# Patient Record
Sex: FEMALE | Race: WHITE | Employment: OTHER | URBAN - METROPOLITAN AREA
[De-identification: names, ages, dates, MRNs, and addresses within clinical notes are randomized per-mention and may not be internally consistent; named-entity substitution may affect disease eponyms.]

---

## 2017-01-30 ENCOUNTER — OFFICE VISIT (OUTPATIENT)
Dept: DERMATOLOGY | Age: 82
End: 2017-01-30

## 2017-01-30 DIAGNOSIS — L82.0 SEBORRHEIC KERATOSES, INFLAMED: Primary | ICD-10-CM

## 2017-01-30 DIAGNOSIS — L57.0 AK (ACTINIC KERATOSIS): ICD-10-CM

## 2017-01-30 DIAGNOSIS — L82.1 SEBORRHEIC KERATOSES: ICD-10-CM

## 2017-01-30 PROCEDURE — 17000 DESTRUCT PREMALG LESION: CPT | Performed by: DERMATOLOGY

## 2017-01-30 PROCEDURE — 17110 DESTRUCTION B9 LES UP TO 14: CPT | Performed by: DERMATOLOGY

## 2017-01-30 PROCEDURE — 99201 PR OFFICE OUTPATIENT NEW 10 MINUTES: CPT | Performed by: DERMATOLOGY

## 2017-02-03 ENCOUNTER — OFFICE VISIT (OUTPATIENT)
Dept: INTERNAL MEDICINE CLINIC | Age: 82
End: 2017-02-03

## 2017-02-03 VITALS
HEART RATE: 76 BPM | WEIGHT: 167 LBS | SYSTOLIC BLOOD PRESSURE: 136 MMHG | RESPIRATION RATE: 16 BRPM | BODY MASS INDEX: 32.79 KG/M2 | DIASTOLIC BLOOD PRESSURE: 80 MMHG | HEIGHT: 60 IN

## 2017-02-03 DIAGNOSIS — M79.606 PAIN OF LOWER EXTREMITY, UNSPECIFIED LATERALITY: Primary | ICD-10-CM

## 2017-02-03 DIAGNOSIS — R73.09 BLOOD GLUCOSE ABNORMAL: ICD-10-CM

## 2017-02-03 DIAGNOSIS — K64.9 HEMORRHOIDS, UNSPECIFIED HEMORRHOID TYPE: ICD-10-CM

## 2017-02-03 DIAGNOSIS — R73.9 HYPERGLYCEMIA: ICD-10-CM

## 2017-02-03 PROCEDURE — 99214 OFFICE O/P EST MOD 30 MIN: CPT | Performed by: INTERNAL MEDICINE

## 2017-02-03 ASSESSMENT — ENCOUNTER SYMPTOMS
SHORTNESS OF BREATH: 0
ABDOMINAL PAIN: 0

## 2017-02-04 LAB
ESTIMATED AVERAGE GLUCOSE: 111.2 MG/DL
HBA1C MFR BLD: 5.5 %

## 2017-02-08 RX ORDER — LORAZEPAM 0.5 MG/1
TABLET ORAL
Qty: 60 TABLET | Refills: 2 | Status: SHIPPED | OUTPATIENT
Start: 2017-02-08 | End: 2017-05-08 | Stop reason: SDUPTHER

## 2017-03-06 RX ORDER — PANTOPRAZOLE SODIUM 40 MG
TABLET, DELAYED RELEASE (ENTERIC COATED) ORAL
Qty: 90 TABLET | Refills: 0 | Status: SHIPPED | OUTPATIENT
Start: 2017-03-06 | End: 2017-06-01 | Stop reason: SDUPTHER

## 2017-03-07 ENCOUNTER — OFFICE VISIT (OUTPATIENT)
Dept: INTERNAL MEDICINE CLINIC | Age: 82
End: 2017-03-07

## 2017-03-07 VITALS
OXYGEN SATURATION: 98 % | SYSTOLIC BLOOD PRESSURE: 140 MMHG | WEIGHT: 167 LBS | HEART RATE: 84 BPM | HEIGHT: 60 IN | DIASTOLIC BLOOD PRESSURE: 80 MMHG | BODY MASS INDEX: 32.79 KG/M2

## 2017-03-07 DIAGNOSIS — M19.90 ARTHRITIS: ICD-10-CM

## 2017-03-07 DIAGNOSIS — G62.9 NEUROPATHY: Primary | ICD-10-CM

## 2017-03-07 LAB — SEDIMENTATION RATE, ERYTHROCYTE: 15 MM/HR (ref 0–30)

## 2017-03-07 PROCEDURE — 99213 OFFICE O/P EST LOW 20 MIN: CPT | Performed by: INTERNAL MEDICINE

## 2017-03-07 ASSESSMENT — ENCOUNTER SYMPTOMS
EYES NEGATIVE: 1
RESPIRATORY NEGATIVE: 1

## 2017-03-08 LAB
ALBUMIN SERPL-MCNC: 3.6 G/DL (ref 3.1–4.9)
ALPHA-1-GLOBULIN: 0.3 G/DL (ref 0.2–0.4)
ALPHA-2-GLOBULIN: 0.8 G/DL (ref 0.4–1.1)
BETA GLOBULIN: 1.1 G/DL (ref 0.9–1.6)
GAMMA GLOBULIN: 0.9 G/DL (ref 0.6–1.8)
SPE/IFE INTERPRETATION: NORMAL
TOTAL PROTEIN: 6.7 G/DL (ref 6.4–8.2)

## 2017-04-13 ENCOUNTER — OFFICE VISIT (OUTPATIENT)
Dept: INTERNAL MEDICINE CLINIC | Age: 82
End: 2017-04-13

## 2017-04-13 VITALS
HEART RATE: 80 BPM | HEIGHT: 60 IN | DIASTOLIC BLOOD PRESSURE: 76 MMHG | BODY MASS INDEX: 32.59 KG/M2 | SYSTOLIC BLOOD PRESSURE: 148 MMHG | RESPIRATION RATE: 16 BRPM | WEIGHT: 166 LBS

## 2017-04-13 DIAGNOSIS — K21.9 GASTROESOPHAGEAL REFLUX DISEASE WITHOUT ESOPHAGITIS: ICD-10-CM

## 2017-04-13 DIAGNOSIS — F41.9 ANXIETY: Primary | ICD-10-CM

## 2017-04-13 DIAGNOSIS — I10 ESSENTIAL HYPERTENSION: ICD-10-CM

## 2017-04-13 PROCEDURE — 99213 OFFICE O/P EST LOW 20 MIN: CPT | Performed by: INTERNAL MEDICINE

## 2017-04-13 ASSESSMENT — ENCOUNTER SYMPTOMS
ABDOMINAL PAIN: 0
SHORTNESS OF BREATH: 0

## 2017-04-24 ASSESSMENT — ENCOUNTER SYMPTOMS
ABDOMINAL PAIN: 0
SHORTNESS OF BREATH: 0

## 2017-04-26 DIAGNOSIS — E78.00 PURE HYPERCHOLESTEROLEMIA: Primary | ICD-10-CM

## 2017-04-26 DIAGNOSIS — I10 ESSENTIAL HYPERTENSION: ICD-10-CM

## 2017-04-26 LAB
A/G RATIO: 1.8 (ref 1.1–2.2)
ALBUMIN SERPL-MCNC: 4.3 G/DL (ref 3.4–5)
ALP BLD-CCNC: 61 U/L (ref 40–129)
ALT SERPL-CCNC: 23 U/L (ref 10–40)
ANION GAP SERPL CALCULATED.3IONS-SCNC: 16 MMOL/L (ref 3–16)
AST SERPL-CCNC: 24 U/L (ref 15–37)
BASOPHILS ABSOLUTE: 0 K/UL (ref 0–0.2)
BASOPHILS RELATIVE PERCENT: 0.4 %
BILIRUB SERPL-MCNC: 0.5 MG/DL (ref 0–1)
BUN BLDV-MCNC: 15 MG/DL (ref 7–20)
CALCIUM SERPL-MCNC: 9.8 MG/DL (ref 8.3–10.6)
CHLORIDE BLD-SCNC: 99 MMOL/L (ref 99–110)
CHOLESTEROL, TOTAL: 174 MG/DL (ref 0–199)
CO2: 24 MMOL/L (ref 21–32)
CREAT SERPL-MCNC: 0.7 MG/DL (ref 0.6–1.2)
EOSINOPHILS ABSOLUTE: 0.1 K/UL (ref 0–0.6)
EOSINOPHILS RELATIVE PERCENT: 2.7 %
GFR AFRICAN AMERICAN: >60
GFR NON-AFRICAN AMERICAN: >60
GLOBULIN: 2.4 G/DL
GLUCOSE BLD-MCNC: 104 MG/DL (ref 70–99)
HCT VFR BLD CALC: 42.3 % (ref 36–48)
HDLC SERPL-MCNC: 62 MG/DL (ref 40–60)
HEMOGLOBIN: 13.8 G/DL (ref 12–16)
LDL CHOLESTEROL CALCULATED: 72 MG/DL
LYMPHOCYTES ABSOLUTE: 1 K/UL (ref 1–5.1)
LYMPHOCYTES RELATIVE PERCENT: 22.4 %
MCH RBC QN AUTO: 30.7 PG (ref 26–34)
MCHC RBC AUTO-ENTMCNC: 32.6 G/DL (ref 31–36)
MCV RBC AUTO: 94 FL (ref 80–100)
MONOCYTES ABSOLUTE: 0.4 K/UL (ref 0–1.3)
MONOCYTES RELATIVE PERCENT: 9.2 %
NEUTROPHILS ABSOLUTE: 3 K/UL (ref 1.7–7.7)
NEUTROPHILS RELATIVE PERCENT: 65.3 %
PDW BLD-RTO: 13.2 % (ref 12.4–15.4)
PLATELET # BLD: 215 K/UL (ref 135–450)
PMV BLD AUTO: 9.1 FL (ref 5–10.5)
POTASSIUM SERPL-SCNC: 4.2 MMOL/L (ref 3.5–5.1)
RBC # BLD: 4.5 M/UL (ref 4–5.2)
SODIUM BLD-SCNC: 139 MMOL/L (ref 136–145)
TOTAL PROTEIN: 6.7 G/DL (ref 6.4–8.2)
TRIGL SERPL-MCNC: 201 MG/DL (ref 0–150)
VLDLC SERPL CALC-MCNC: 40 MG/DL
WBC # BLD: 4.6 K/UL (ref 4–11)

## 2017-04-28 ENCOUNTER — OFFICE VISIT (OUTPATIENT)
Dept: INTERNAL MEDICINE CLINIC | Age: 82
End: 2017-04-28

## 2017-04-28 VITALS
SYSTOLIC BLOOD PRESSURE: 136 MMHG | WEIGHT: 165 LBS | BODY MASS INDEX: 32.39 KG/M2 | HEIGHT: 60 IN | RESPIRATION RATE: 16 BRPM | HEART RATE: 88 BPM | DIASTOLIC BLOOD PRESSURE: 64 MMHG

## 2017-04-28 DIAGNOSIS — E78.00 PURE HYPERCHOLESTEROLEMIA: ICD-10-CM

## 2017-04-28 DIAGNOSIS — F41.9 ANXIETY: Primary | ICD-10-CM

## 2017-04-28 DIAGNOSIS — I10 ESSENTIAL HYPERTENSION: ICD-10-CM

## 2017-04-28 PROCEDURE — 99214 OFFICE O/P EST MOD 30 MIN: CPT | Performed by: INTERNAL MEDICINE

## 2017-05-09 RX ORDER — LORAZEPAM 0.5 MG/1
TABLET ORAL
Qty: 60 TABLET | Refills: 3 | Status: SHIPPED | OUTPATIENT
Start: 2017-05-09 | End: 2017-09-06 | Stop reason: SDUPTHER

## 2017-05-23 RX ORDER — SIMVASTATIN 10 MG
TABLET ORAL
Qty: 90 TABLET | Refills: 2 | Status: SHIPPED | OUTPATIENT
Start: 2017-05-23 | End: 2017-11-28

## 2017-06-01 RX ORDER — PANTOPRAZOLE SODIUM 40 MG
TABLET, DELAYED RELEASE (ENTERIC COATED) ORAL
Qty: 90 TABLET | Refills: 0 | Status: SHIPPED | OUTPATIENT
Start: 2017-06-01 | End: 2017-09-02 | Stop reason: SDUPTHER

## 2017-06-19 ENCOUNTER — TELEPHONE (OUTPATIENT)
Dept: INTERNAL MEDICINE CLINIC | Age: 82
End: 2017-06-19

## 2017-06-20 ENCOUNTER — OFFICE VISIT (OUTPATIENT)
Dept: INTERNAL MEDICINE CLINIC | Age: 82
End: 2017-06-20

## 2017-06-20 VITALS
RESPIRATION RATE: 16 BRPM | DIASTOLIC BLOOD PRESSURE: 80 MMHG | BODY MASS INDEX: 31.8 KG/M2 | WEIGHT: 162 LBS | HEIGHT: 60 IN | HEART RATE: 80 BPM | SYSTOLIC BLOOD PRESSURE: 136 MMHG

## 2017-06-20 DIAGNOSIS — I10 ESSENTIAL HYPERTENSION: Primary | ICD-10-CM

## 2017-06-20 DIAGNOSIS — R53.1 WEAKNESS: ICD-10-CM

## 2017-06-20 DIAGNOSIS — E78.00 PURE HYPERCHOLESTEROLEMIA: ICD-10-CM

## 2017-06-20 PROCEDURE — 99214 OFFICE O/P EST MOD 30 MIN: CPT | Performed by: INTERNAL MEDICINE

## 2017-06-20 RX ORDER — FUROSEMIDE 20 MG/1
TABLET ORAL
Qty: 90 TABLET | Refills: 3 | Status: SHIPPED
Start: 2017-06-20 | End: 2019-06-10 | Stop reason: SDUPTHER

## 2017-06-20 RX ORDER — POTASSIUM CHLORIDE 750 MG/1
10 TABLET, EXTENDED RELEASE ORAL EVERY OTHER DAY
Qty: 90 TABLET | Refills: 2
Start: 2017-06-20 | End: 2017-09-05

## 2017-06-20 ASSESSMENT — ENCOUNTER SYMPTOMS
SHORTNESS OF BREATH: 0
ABDOMINAL PAIN: 0

## 2017-06-23 ASSESSMENT — ENCOUNTER SYMPTOMS
ABDOMINAL PAIN: 0
SHORTNESS OF BREATH: 0

## 2017-06-27 ENCOUNTER — OFFICE VISIT (OUTPATIENT)
Dept: INTERNAL MEDICINE CLINIC | Age: 82
End: 2017-06-27

## 2017-06-27 DIAGNOSIS — N30.00 ACUTE CYSTITIS WITHOUT HEMATURIA: Primary | ICD-10-CM

## 2017-06-27 DIAGNOSIS — F41.9 ANXIETY: ICD-10-CM

## 2017-06-27 DIAGNOSIS — I10 ESSENTIAL HYPERTENSION: ICD-10-CM

## 2017-06-27 PROCEDURE — 99999 PR OFFICE/OUTPT VISIT,PROCEDURE ONLY: CPT | Performed by: INTERNAL MEDICINE

## 2017-08-07 ENCOUNTER — OFFICE VISIT (OUTPATIENT)
Dept: INTERNAL MEDICINE CLINIC | Age: 82
End: 2017-08-07

## 2017-08-07 VITALS
OXYGEN SATURATION: 98 % | BODY MASS INDEX: 31.53 KG/M2 | TEMPERATURE: 97.7 F | RESPIRATION RATE: 16 BRPM | WEIGHT: 160.6 LBS | HEIGHT: 60 IN | SYSTOLIC BLOOD PRESSURE: 138 MMHG | DIASTOLIC BLOOD PRESSURE: 86 MMHG | HEART RATE: 64 BPM

## 2017-08-07 DIAGNOSIS — I10 ESSENTIAL HYPERTENSION: ICD-10-CM

## 2017-08-07 DIAGNOSIS — K21.9 GASTROESOPHAGEAL REFLUX DISEASE WITHOUT ESOPHAGITIS: ICD-10-CM

## 2017-08-07 DIAGNOSIS — R10.9 ABDOMINAL PAIN, UNSPECIFIED LOCATION: Primary | ICD-10-CM

## 2017-08-07 PROCEDURE — 99214 OFFICE O/P EST MOD 30 MIN: CPT | Performed by: INTERNAL MEDICINE

## 2017-08-07 ASSESSMENT — PATIENT HEALTH QUESTIONNAIRE - PHQ9
SUM OF ALL RESPONSES TO PHQ9 QUESTIONS 1 & 2: 0
2. FEELING DOWN, DEPRESSED OR HOPELESS: 0
SUM OF ALL RESPONSES TO PHQ QUESTIONS 1-9: 0
1. LITTLE INTEREST OR PLEASURE IN DOING THINGS: 0

## 2017-08-07 ASSESSMENT — ENCOUNTER SYMPTOMS
ABDOMINAL PAIN: 0
SHORTNESS OF BREATH: 0

## 2017-08-14 ENCOUNTER — OFFICE VISIT (OUTPATIENT)
Dept: INTERNAL MEDICINE CLINIC | Age: 82
End: 2017-08-14

## 2017-08-14 VITALS
RESPIRATION RATE: 16 BRPM | HEART RATE: 80 BPM | BODY MASS INDEX: 31.22 KG/M2 | WEIGHT: 159 LBS | SYSTOLIC BLOOD PRESSURE: 130 MMHG | DIASTOLIC BLOOD PRESSURE: 72 MMHG | HEIGHT: 60 IN

## 2017-08-14 DIAGNOSIS — N39.0 LOWER URINARY TRACT INFECTIOUS DISEASE: ICD-10-CM

## 2017-08-14 DIAGNOSIS — I10 ESSENTIAL HYPERTENSION: ICD-10-CM

## 2017-08-14 DIAGNOSIS — R07.2 PRECORDIAL PAIN: Primary | ICD-10-CM

## 2017-08-14 DIAGNOSIS — K21.9 GASTROESOPHAGEAL REFLUX DISEASE WITHOUT ESOPHAGITIS: ICD-10-CM

## 2017-08-14 LAB
BILIRUBIN, POC: NORMAL
BLOOD URINE, POC: NORMAL
CLARITY, POC: NORMAL
COLOR, POC: NORMAL
GLUCOSE URINE, POC: NORMAL
KETONES, POC: NORMAL
LEUKOCYTE EST, POC: NORMAL
NITRITE, POC: POSITIVE
PH, POC: 6
PROTEIN, POC: NORMAL
SPECIFIC GRAVITY, POC: 1.01
UROBILINOGEN, POC: 0.2

## 2017-08-14 PROCEDURE — 81002 URINALYSIS NONAUTO W/O SCOPE: CPT | Performed by: INTERNAL MEDICINE

## 2017-08-14 PROCEDURE — 99213 OFFICE O/P EST LOW 20 MIN: CPT | Performed by: INTERNAL MEDICINE

## 2017-08-14 RX ORDER — CEFUROXIME AXETIL 250 MG/1
250 TABLET ORAL 2 TIMES DAILY
Qty: 10 TABLET | Refills: 0 | Status: SHIPPED | OUTPATIENT
Start: 2017-08-14 | End: 2017-08-19

## 2017-08-14 ASSESSMENT — ENCOUNTER SYMPTOMS
ABDOMINAL PAIN: 0
SHORTNESS OF BREATH: 0

## 2017-08-16 LAB — URINE CULTURE, ROUTINE: NORMAL

## 2017-08-22 ASSESSMENT — ENCOUNTER SYMPTOMS
ABDOMINAL PAIN: 0
SHORTNESS OF BREATH: 0

## 2017-08-23 DIAGNOSIS — E78.00 PURE HYPERCHOLESTEROLEMIA: ICD-10-CM

## 2017-08-23 LAB
A/G RATIO: 2 (ref 1.1–2.2)
ALBUMIN SERPL-MCNC: 4.5 G/DL (ref 3.4–5)
ALP BLD-CCNC: 57 U/L (ref 40–129)
ALT SERPL-CCNC: 11 U/L (ref 10–40)
ANION GAP SERPL CALCULATED.3IONS-SCNC: 15 MMOL/L (ref 3–16)
AST SERPL-CCNC: 16 U/L (ref 15–37)
BILIRUB SERPL-MCNC: 0.6 MG/DL (ref 0–1)
BUN BLDV-MCNC: 17 MG/DL (ref 7–20)
CALCIUM SERPL-MCNC: 9.9 MG/DL (ref 8.3–10.6)
CHLORIDE BLD-SCNC: 100 MMOL/L (ref 99–110)
CHOLESTEROL, TOTAL: 190 MG/DL (ref 0–199)
CO2: 26 MMOL/L (ref 21–32)
CREAT SERPL-MCNC: 0.7 MG/DL (ref 0.6–1.2)
GFR AFRICAN AMERICAN: >60
GFR NON-AFRICAN AMERICAN: >60
GLOBULIN: 2.3 G/DL
GLUCOSE BLD-MCNC: 105 MG/DL (ref 70–99)
HDLC SERPL-MCNC: 71 MG/DL (ref 40–60)
LDL CHOLESTEROL CALCULATED: 91 MG/DL
POTASSIUM SERPL-SCNC: 4 MMOL/L (ref 3.5–5.1)
SODIUM BLD-SCNC: 141 MMOL/L (ref 136–145)
TOTAL PROTEIN: 6.8 G/DL (ref 6.4–8.2)
TRIGL SERPL-MCNC: 140 MG/DL (ref 0–150)
VLDLC SERPL CALC-MCNC: 28 MG/DL

## 2017-08-28 ENCOUNTER — OFFICE VISIT (OUTPATIENT)
Dept: INTERNAL MEDICINE CLINIC | Age: 82
End: 2017-08-28

## 2017-08-28 VITALS
WEIGHT: 160 LBS | BODY MASS INDEX: 31.41 KG/M2 | HEART RATE: 100 BPM | HEIGHT: 60 IN | RESPIRATION RATE: 16 BRPM | DIASTOLIC BLOOD PRESSURE: 80 MMHG | SYSTOLIC BLOOD PRESSURE: 132 MMHG

## 2017-08-28 DIAGNOSIS — R10.9 ABDOMINAL PAIN, UNSPECIFIED LOCATION: Primary | ICD-10-CM

## 2017-08-28 DIAGNOSIS — E78.00 HYPERCHOLESTEREMIA: ICD-10-CM

## 2017-08-28 DIAGNOSIS — F41.9 ANXIETY: ICD-10-CM

## 2017-08-28 DIAGNOSIS — I10 ESSENTIAL HYPERTENSION: ICD-10-CM

## 2017-08-28 PROCEDURE — 99214 OFFICE O/P EST MOD 30 MIN: CPT | Performed by: INTERNAL MEDICINE

## 2017-08-28 RX ORDER — GABAPENTIN 100 MG/1
100 CAPSULE ORAL NIGHTLY
Qty: 30 CAPSULE | Refills: 5 | Status: SHIPPED | OUTPATIENT
Start: 2017-08-28 | End: 2018-01-18 | Stop reason: SINTOL

## 2017-08-29 ENCOUNTER — TELEPHONE (OUTPATIENT)
Dept: INTERNAL MEDICINE CLINIC | Age: 82
End: 2017-08-29

## 2017-09-05 RX ORDER — PANTOPRAZOLE SODIUM 40 MG
TABLET, DELAYED RELEASE (ENTERIC COATED) ORAL
Qty: 90 TABLET | Refills: 0 | Status: SHIPPED | OUTPATIENT
Start: 2017-09-05 | End: 2017-12-05 | Stop reason: SDUPTHER

## 2017-09-05 RX ORDER — POTASSIUM CHLORIDE 750 MG/1
TABLET, EXTENDED RELEASE ORAL
Qty: 90 TABLET | Refills: 1 | Status: SHIPPED | OUTPATIENT
Start: 2017-09-05 | End: 2018-03-26 | Stop reason: SDUPTHER

## 2017-09-06 RX ORDER — LORAZEPAM 0.5 MG/1
TABLET ORAL
Qty: 60 TABLET | Refills: 3 | Status: SHIPPED | OUTPATIENT
Start: 2017-09-06 | End: 2018-01-03 | Stop reason: SDUPTHER

## 2017-11-21 DIAGNOSIS — E78.00 HYPERCHOLESTEREMIA: ICD-10-CM

## 2017-11-21 LAB
A/G RATIO: 1.6 (ref 1.1–2.2)
ALBUMIN SERPL-MCNC: 4.4 G/DL (ref 3.4–5)
ALP BLD-CCNC: 60 U/L (ref 40–129)
ALT SERPL-CCNC: 12 U/L (ref 10–40)
ANION GAP SERPL CALCULATED.3IONS-SCNC: 14 MMOL/L (ref 3–16)
AST SERPL-CCNC: 18 U/L (ref 15–37)
BILIRUB SERPL-MCNC: 0.6 MG/DL (ref 0–1)
BUN BLDV-MCNC: 15 MG/DL (ref 7–20)
CALCIUM SERPL-MCNC: 9.7 MG/DL (ref 8.3–10.6)
CHLORIDE BLD-SCNC: 102 MMOL/L (ref 99–110)
CHOLESTEROL, TOTAL: 182 MG/DL (ref 0–199)
CO2: 27 MMOL/L (ref 21–32)
CREAT SERPL-MCNC: 0.6 MG/DL (ref 0.6–1.2)
GFR AFRICAN AMERICAN: >60
GFR NON-AFRICAN AMERICAN: >60
GLOBULIN: 2.7 G/DL
GLUCOSE BLD-MCNC: 103 MG/DL (ref 70–99)
HDLC SERPL-MCNC: 70 MG/DL (ref 40–60)
LDL CHOLESTEROL CALCULATED: 76 MG/DL
POTASSIUM SERPL-SCNC: 4 MMOL/L (ref 3.5–5.1)
SODIUM BLD-SCNC: 143 MMOL/L (ref 136–145)
TOTAL PROTEIN: 7.1 G/DL (ref 6.4–8.2)
TRIGL SERPL-MCNC: 181 MG/DL (ref 0–150)
VLDLC SERPL CALC-MCNC: 36 MG/DL

## 2017-11-24 ASSESSMENT — ENCOUNTER SYMPTOMS
ABDOMINAL PAIN: 0
SHORTNESS OF BREATH: 0

## 2017-11-24 NOTE — PROGRESS NOTES
Subjective:      Patient ID: Bill Ramos is a 80 y.o. female. HPI  1. Essential hypertension --Stable--no new issues      2. Gastroesophageal reflux disease without esophagitis --Stable--no new issues      3. Pure hypercholesterolemia --Stable--no new issues----lab noted and reviewed      I am estuardo--lorazepam does not help-- this feeling goes away but comes back-- will dc simvi--see if it helps --   Leg tingling--not takine arash--   Facial SK--ret to derm--   Rt legs achy-- s/p Rt TKR-2009--- ibu jhelps-- I recc cont ibu 200 mg 2 po bid   Here with kevon-- mult questions   Review of Systems   Respiratory: Negative for shortness of breath. Cardiovascular: Negative for chest pain. Gastrointestinal: Negative for abdominal pain. Objective:   Physical Exam   HENT:   Head: Normocephalic. Eyes: Pupils are equal, round, and reactive to light. Neck: Normal range of motion. Cardiovascular: Normal rate and normal heart sounds. Pulmonary/Chest: Effort normal.   Abdominal: Soft. Musculoskeletal: She exhibits edema. Neurological: She is alert. Skin: Skin is warm. Assessment:      1. Essential hypertension --Continue current therapy      2. Gastroesophageal reflux disease without esophagitis     3. Pure hypercholesterolemia     4.  SK (seborrheic keratosis)  Sangita Romo MD   Anxiety--dc simvi--considr taper ativan form bid at next ov   rto 6mos -- ext ov          Plan:

## 2017-11-28 ENCOUNTER — OFFICE VISIT (OUTPATIENT)
Dept: INTERNAL MEDICINE CLINIC | Age: 82
End: 2017-11-28

## 2017-11-28 VITALS
DIASTOLIC BLOOD PRESSURE: 80 MMHG | BODY MASS INDEX: 30.82 KG/M2 | HEIGHT: 60 IN | HEART RATE: 84 BPM | SYSTOLIC BLOOD PRESSURE: 130 MMHG | WEIGHT: 157 LBS | RESPIRATION RATE: 16 BRPM

## 2017-11-28 DIAGNOSIS — L82.1 SK (SEBORRHEIC KERATOSIS): ICD-10-CM

## 2017-11-28 DIAGNOSIS — E78.00 PURE HYPERCHOLESTEROLEMIA: ICD-10-CM

## 2017-11-28 DIAGNOSIS — I10 ESSENTIAL HYPERTENSION: Primary | ICD-10-CM

## 2017-11-28 DIAGNOSIS — K21.9 GASTROESOPHAGEAL REFLUX DISEASE WITHOUT ESOPHAGITIS: ICD-10-CM

## 2017-11-28 PROCEDURE — G8427 DOCREV CUR MEDS BY ELIG CLIN: HCPCS | Performed by: INTERNAL MEDICINE

## 2017-11-28 PROCEDURE — 1036F TOBACCO NON-USER: CPT | Performed by: INTERNAL MEDICINE

## 2017-11-28 PROCEDURE — G8484 FLU IMMUNIZE NO ADMIN: HCPCS | Performed by: INTERNAL MEDICINE

## 2017-11-28 PROCEDURE — 99214 OFFICE O/P EST MOD 30 MIN: CPT | Performed by: INTERNAL MEDICINE

## 2017-11-28 PROCEDURE — G8417 CALC BMI ABV UP PARAM F/U: HCPCS | Performed by: INTERNAL MEDICINE

## 2017-11-28 PROCEDURE — 4040F PNEUMOC VAC/ADMIN/RCVD: CPT | Performed by: INTERNAL MEDICINE

## 2017-11-28 PROCEDURE — 1123F ACP DISCUSS/DSCN MKR DOCD: CPT | Performed by: INTERNAL MEDICINE

## 2017-11-28 PROCEDURE — 1090F PRES/ABSN URINE INCON ASSESS: CPT | Performed by: INTERNAL MEDICINE

## 2017-12-05 RX ORDER — PANTOPRAZOLE SODIUM 40 MG
TABLET, DELAYED RELEASE (ENTERIC COATED) ORAL
Qty: 90 TABLET | Refills: 0 | Status: SHIPPED | OUTPATIENT
Start: 2017-12-05 | End: 2018-02-28 | Stop reason: SDUPTHER

## 2018-01-03 RX ORDER — LORAZEPAM 0.5 MG/1
TABLET ORAL
Qty: 60 TABLET | Refills: 2 | Status: SHIPPED | OUTPATIENT
Start: 2018-01-03 | End: 2018-04-03

## 2018-01-03 RX ORDER — FUROSEMIDE 20 MG/1
TABLET ORAL
Qty: 90 TABLET | Refills: 2 | Status: SHIPPED | OUTPATIENT
Start: 2018-01-03 | End: 2018-01-18 | Stop reason: SDUPTHER

## 2018-01-18 ENCOUNTER — TELEPHONE (OUTPATIENT)
Dept: INTERNAL MEDICINE CLINIC | Age: 83
End: 2018-01-18

## 2018-01-18 ENCOUNTER — OFFICE VISIT (OUTPATIENT)
Dept: INTERNAL MEDICINE CLINIC | Age: 83
End: 2018-01-18

## 2018-01-18 VITALS
DIASTOLIC BLOOD PRESSURE: 80 MMHG | HEART RATE: 86 BPM | OXYGEN SATURATION: 98 % | SYSTOLIC BLOOD PRESSURE: 154 MMHG | TEMPERATURE: 98 F | WEIGHT: 158 LBS | BODY MASS INDEX: 30.86 KG/M2

## 2018-01-18 DIAGNOSIS — R30.0 DYSURIA: Primary | ICD-10-CM

## 2018-01-18 LAB
BILIRUBIN URINE: NEGATIVE
BILIRUBIN, POC: NEGATIVE
BLOOD URINE, POC: ABNORMAL
BLOOD, URINE: NEGATIVE
CLARITY, POC: CLEAR
CLARITY: CLEAR
COLOR, POC: ABNORMAL
COLOR: YELLOW
GLUCOSE URINE, POC: NEGATIVE
GLUCOSE URINE: NEGATIVE MG/DL
KETONES, POC: NEGATIVE
KETONES, URINE: NEGATIVE MG/DL
LEUKOCYTE EST, POC: NEGATIVE
LEUKOCYTE ESTERASE, URINE: NEGATIVE
MICROSCOPIC EXAMINATION: NORMAL
NITRITE, POC: NEGATIVE
NITRITE, URINE: NEGATIVE
PH UA: 6
PH, POC: 6
PROTEIN UA: NEGATIVE MG/DL
PROTEIN, POC: NEGATIVE
SPECIFIC GRAVITY UA: 1
SPECIFIC GRAVITY, POC: 1
URINE REFLEX TO CULTURE: NORMAL
URINE TYPE: NORMAL
UROBILINOGEN, POC: 0.2
UROBILINOGEN, URINE: 0.2 E.U./DL

## 2018-01-18 PROCEDURE — 81002 URINALYSIS NONAUTO W/O SCOPE: CPT | Performed by: INTERNAL MEDICINE

## 2018-01-18 PROCEDURE — 99212 OFFICE O/P EST SF 10 MIN: CPT | Performed by: INTERNAL MEDICINE

## 2018-01-18 RX ORDER — NITROFURANTOIN 25; 75 MG/1; MG/1
100 CAPSULE ORAL 2 TIMES DAILY
Qty: 6 CAPSULE | Refills: 1 | Status: SHIPPED | OUTPATIENT
Start: 2018-01-18 | End: 2018-01-21
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
## 2018-02-28 RX ORDER — PANTOPRAZOLE SODIUM 40 MG
TABLET, DELAYED RELEASE (ENTERIC COATED) ORAL
Qty: 90 TABLET | Refills: 0 | Status: SHIPPED | OUTPATIENT
Start: 2018-02-28 | End: 2018-06-11 | Stop reason: SDUPTHER

## 2018-03-26 RX ORDER — POTASSIUM CHLORIDE 750 MG/1
TABLET, EXTENDED RELEASE ORAL
Qty: 90 TABLET | Refills: 3 | Status: SHIPPED | OUTPATIENT
Start: 2018-03-26 | End: 2019-07-17 | Stop reason: SDUPTHER

## 2018-04-05 DIAGNOSIS — F41.9 ANXIETY: Primary | ICD-10-CM

## 2018-04-06 RX ORDER — LORAZEPAM 0.5 MG/1
TABLET ORAL
Qty: 60 TABLET | Refills: 1 | Status: SHIPPED | OUTPATIENT
Start: 2018-04-06 | End: 2018-06-04 | Stop reason: SDUPTHER

## 2018-04-09 ENCOUNTER — OFFICE VISIT (OUTPATIENT)
Dept: DERMATOLOGY | Age: 83
End: 2018-04-09

## 2018-04-09 DIAGNOSIS — D18.00 HEMANGIOMA: ICD-10-CM

## 2018-04-09 DIAGNOSIS — L82.0 SEBORRHEIC KERATOSES, INFLAMED: ICD-10-CM

## 2018-04-09 DIAGNOSIS — L82.1 SEBORRHEIC KERATOSES: Primary | ICD-10-CM

## 2018-04-09 PROCEDURE — 1090F PRES/ABSN URINE INCON ASSESS: CPT | Performed by: DERMATOLOGY

## 2018-04-09 PROCEDURE — 1036F TOBACCO NON-USER: CPT | Performed by: DERMATOLOGY

## 2018-04-09 PROCEDURE — 4040F PNEUMOC VAC/ADMIN/RCVD: CPT | Performed by: DERMATOLOGY

## 2018-04-09 PROCEDURE — 17110 DESTRUCTION B9 LES UP TO 14: CPT | Performed by: DERMATOLOGY

## 2018-04-09 PROCEDURE — 99213 OFFICE O/P EST LOW 20 MIN: CPT | Performed by: DERMATOLOGY

## 2018-04-09 PROCEDURE — G8417 CALC BMI ABV UP PARAM F/U: HCPCS | Performed by: DERMATOLOGY

## 2018-04-09 PROCEDURE — G8427 DOCREV CUR MEDS BY ELIG CLIN: HCPCS | Performed by: DERMATOLOGY

## 2018-04-09 PROCEDURE — 1123F ACP DISCUSS/DSCN MKR DOCD: CPT | Performed by: DERMATOLOGY

## 2018-04-20 ENCOUNTER — TELEPHONE (OUTPATIENT)
Dept: INTERNAL MEDICINE CLINIC | Age: 83
End: 2018-04-20

## 2018-04-24 ENCOUNTER — OFFICE VISIT (OUTPATIENT)
Dept: INTERNAL MEDICINE CLINIC | Age: 83
End: 2018-04-24

## 2018-04-24 VITALS
RESPIRATION RATE: 16 BRPM | SYSTOLIC BLOOD PRESSURE: 160 MMHG | HEART RATE: 88 BPM | HEIGHT: 60 IN | WEIGHT: 156 LBS | DIASTOLIC BLOOD PRESSURE: 88 MMHG | BODY MASS INDEX: 30.63 KG/M2

## 2018-04-24 DIAGNOSIS — S83.92XA SPRAIN OF LEFT KNEE, UNSPECIFIED LIGAMENT, INITIAL ENCOUNTER: ICD-10-CM

## 2018-04-24 DIAGNOSIS — I10 ESSENTIAL HYPERTENSION: ICD-10-CM

## 2018-04-24 DIAGNOSIS — R10.9 ABDOMINAL PAIN, UNSPECIFIED ABDOMINAL LOCATION: Primary | ICD-10-CM

## 2018-04-24 PROCEDURE — G8417 CALC BMI ABV UP PARAM F/U: HCPCS | Performed by: INTERNAL MEDICINE

## 2018-04-24 PROCEDURE — G8427 DOCREV CUR MEDS BY ELIG CLIN: HCPCS | Performed by: INTERNAL MEDICINE

## 2018-04-24 PROCEDURE — 1090F PRES/ABSN URINE INCON ASSESS: CPT | Performed by: INTERNAL MEDICINE

## 2018-04-24 PROCEDURE — 4040F PNEUMOC VAC/ADMIN/RCVD: CPT | Performed by: INTERNAL MEDICINE

## 2018-04-24 PROCEDURE — 1036F TOBACCO NON-USER: CPT | Performed by: INTERNAL MEDICINE

## 2018-04-24 PROCEDURE — 99214 OFFICE O/P EST MOD 30 MIN: CPT | Performed by: INTERNAL MEDICINE

## 2018-04-24 PROCEDURE — 1123F ACP DISCUSS/DSCN MKR DOCD: CPT | Performed by: INTERNAL MEDICINE

## 2018-04-24 ASSESSMENT — ENCOUNTER SYMPTOMS
ABDOMINAL PAIN: 0
SHORTNESS OF BREATH: 0

## 2018-04-30 ENCOUNTER — INITIAL CONSULT (OUTPATIENT)
Dept: SURGERY | Age: 83
End: 2018-04-30

## 2018-04-30 VITALS
HEIGHT: 60 IN | WEIGHT: 156 LBS | BODY MASS INDEX: 30.63 KG/M2 | DIASTOLIC BLOOD PRESSURE: 84 MMHG | SYSTOLIC BLOOD PRESSURE: 136 MMHG

## 2018-04-30 DIAGNOSIS — R10.32 LEFT GROIN PAIN: ICD-10-CM

## 2018-04-30 DIAGNOSIS — K43.9 SPIGELIAN HERNIA: Primary | ICD-10-CM

## 2018-04-30 PROCEDURE — 4040F PNEUMOC VAC/ADMIN/RCVD: CPT | Performed by: SURGERY

## 2018-04-30 PROCEDURE — 1036F TOBACCO NON-USER: CPT | Performed by: SURGERY

## 2018-04-30 PROCEDURE — G8417 CALC BMI ABV UP PARAM F/U: HCPCS | Performed by: SURGERY

## 2018-04-30 PROCEDURE — 99203 OFFICE O/P NEW LOW 30 MIN: CPT | Performed by: SURGERY

## 2018-04-30 PROCEDURE — 1090F PRES/ABSN URINE INCON ASSESS: CPT | Performed by: SURGERY

## 2018-04-30 PROCEDURE — G8427 DOCREV CUR MEDS BY ELIG CLIN: HCPCS | Performed by: SURGERY

## 2018-04-30 PROCEDURE — 1123F ACP DISCUSS/DSCN MKR DOCD: CPT | Performed by: SURGERY

## 2018-04-30 RX ORDER — CEPHALEXIN 500 MG/1
500 CAPSULE ORAL 4 TIMES DAILY
COMMUNITY
End: 2018-05-31 | Stop reason: CLARIF

## 2018-05-01 ASSESSMENT — ENCOUNTER SYMPTOMS: ABDOMINAL PAIN: 1

## 2018-05-09 ENCOUNTER — OFFICE VISIT (OUTPATIENT)
Dept: INTERNAL MEDICINE CLINIC | Age: 83
End: 2018-05-09

## 2018-05-09 VITALS
OXYGEN SATURATION: 97 % | HEIGHT: 60 IN | RESPIRATION RATE: 16 BRPM | WEIGHT: 155 LBS | DIASTOLIC BLOOD PRESSURE: 76 MMHG | TEMPERATURE: 98.4 F | SYSTOLIC BLOOD PRESSURE: 124 MMHG | HEART RATE: 95 BPM | BODY MASS INDEX: 30.43 KG/M2

## 2018-05-09 DIAGNOSIS — R35.0 FREQUENT URINATION: Primary | ICD-10-CM

## 2018-05-09 LAB
BILIRUBIN, POC: NEGATIVE
BLOOD URINE, POC: NORMAL
CLARITY, POC: NORMAL
COLOR, POC: YELLOW
GLUCOSE URINE, POC: NEGATIVE
KETONES, POC: NEGATIVE
LEUKOCYTE EST, POC: NORMAL
NITRITE, POC: NEGATIVE
PH, POC: 6
PROTEIN, POC: NORMAL
SPECIFIC GRAVITY, POC: 1
UROBILINOGEN, POC: 0.2

## 2018-05-09 PROCEDURE — 81002 URINALYSIS NONAUTO W/O SCOPE: CPT | Performed by: INTERNAL MEDICINE

## 2018-05-09 PROCEDURE — 99213 OFFICE O/P EST LOW 20 MIN: CPT | Performed by: INTERNAL MEDICINE

## 2018-05-09 RX ORDER — CEFUROXIME AXETIL 250 MG/1
250 TABLET ORAL 2 TIMES DAILY
Qty: 10 TABLET | Refills: 0 | Status: SHIPPED | OUTPATIENT
Start: 2018-05-09 | End: 2018-05-14

## 2018-05-11 LAB
ORGANISM: ABNORMAL
URINE CULTURE, ROUTINE: ABNORMAL
URINE CULTURE, ROUTINE: ABNORMAL

## 2018-05-17 ENCOUNTER — OFFICE VISIT (OUTPATIENT)
Dept: ORTHOPEDIC SURGERY | Age: 83
End: 2018-05-17

## 2018-05-17 VITALS
TEMPERATURE: 97.5 F | BODY MASS INDEX: 30.43 KG/M2 | DIASTOLIC BLOOD PRESSURE: 83 MMHG | SYSTOLIC BLOOD PRESSURE: 139 MMHG | HEART RATE: 88 BPM | WEIGHT: 155 LBS | HEIGHT: 60 IN

## 2018-05-17 DIAGNOSIS — M17.12 PRIMARY OSTEOARTHRITIS OF LEFT KNEE: ICD-10-CM

## 2018-05-17 DIAGNOSIS — M25.562 PAIN IN BOTH KNEES, UNSPECIFIED CHRONICITY: Primary | ICD-10-CM

## 2018-05-17 DIAGNOSIS — Z96.651 STATUS POST TOTAL RIGHT KNEE REPLACEMENT: ICD-10-CM

## 2018-05-17 DIAGNOSIS — M25.561 PAIN IN BOTH KNEES, UNSPECIFIED CHRONICITY: Primary | ICD-10-CM

## 2018-05-17 PROCEDURE — G8427 DOCREV CUR MEDS BY ELIG CLIN: HCPCS | Performed by: PHYSICIAN ASSISTANT

## 2018-05-17 PROCEDURE — 1123F ACP DISCUSS/DSCN MKR DOCD: CPT | Performed by: PHYSICIAN ASSISTANT

## 2018-05-17 PROCEDURE — 20610 DRAIN/INJ JOINT/BURSA W/O US: CPT | Performed by: PHYSICIAN ASSISTANT

## 2018-05-17 PROCEDURE — 4040F PNEUMOC VAC/ADMIN/RCVD: CPT | Performed by: PHYSICIAN ASSISTANT

## 2018-05-17 PROCEDURE — 1036F TOBACCO NON-USER: CPT | Performed by: PHYSICIAN ASSISTANT

## 2018-05-17 PROCEDURE — 99213 OFFICE O/P EST LOW 20 MIN: CPT | Performed by: PHYSICIAN ASSISTANT

## 2018-05-17 PROCEDURE — 1090F PRES/ABSN URINE INCON ASSESS: CPT | Performed by: PHYSICIAN ASSISTANT

## 2018-05-17 PROCEDURE — G8417 CALC BMI ABV UP PARAM F/U: HCPCS | Performed by: PHYSICIAN ASSISTANT

## 2018-05-22 ENCOUNTER — TELEPHONE (OUTPATIENT)
Dept: ORTHOPEDIC SURGERY | Age: 83
End: 2018-05-22

## 2018-05-30 ASSESSMENT — ENCOUNTER SYMPTOMS
SHORTNESS OF BREATH: 0
ABDOMINAL PAIN: 0

## 2018-05-31 ENCOUNTER — OFFICE VISIT (OUTPATIENT)
Dept: SURGERY | Age: 83
End: 2018-05-31

## 2018-05-31 ENCOUNTER — OFFICE VISIT (OUTPATIENT)
Dept: ORTHOPEDIC SURGERY | Age: 83
End: 2018-05-31

## 2018-05-31 VITALS
HEART RATE: 82 BPM | HEIGHT: 60 IN | SYSTOLIC BLOOD PRESSURE: 139 MMHG | WEIGHT: 155 LBS | TEMPERATURE: 97 F | BODY MASS INDEX: 30.43 KG/M2 | DIASTOLIC BLOOD PRESSURE: 85 MMHG | RESPIRATION RATE: 16 BRPM

## 2018-05-31 VITALS
BODY MASS INDEX: 30.43 KG/M2 | WEIGHT: 155 LBS | HEIGHT: 60 IN | DIASTOLIC BLOOD PRESSURE: 78 MMHG | SYSTOLIC BLOOD PRESSURE: 136 MMHG

## 2018-05-31 DIAGNOSIS — K43.9 SPIGELIAN HERNIA: Primary | ICD-10-CM

## 2018-05-31 DIAGNOSIS — E78.00 PURE HYPERCHOLESTEROLEMIA: ICD-10-CM

## 2018-05-31 DIAGNOSIS — M17.12 PRIMARY OSTEOARTHRITIS OF LEFT KNEE: Primary | ICD-10-CM

## 2018-05-31 LAB
A/G RATIO: 2 (ref 1.1–2.2)
ALBUMIN SERPL-MCNC: 4.8 G/DL (ref 3.4–5)
ALP BLD-CCNC: 61 U/L (ref 40–129)
ALT SERPL-CCNC: 11 U/L (ref 10–40)
ANION GAP SERPL CALCULATED.3IONS-SCNC: 16 MMOL/L (ref 3–16)
AST SERPL-CCNC: 17 U/L (ref 15–37)
BILIRUB SERPL-MCNC: 0.6 MG/DL (ref 0–1)
BUN BLDV-MCNC: 15 MG/DL (ref 7–20)
CALCIUM SERPL-MCNC: 10.3 MG/DL (ref 8.3–10.6)
CHLORIDE BLD-SCNC: 102 MMOL/L (ref 99–110)
CHOLESTEROL, TOTAL: 244 MG/DL (ref 0–199)
CO2: 27 MMOL/L (ref 21–32)
CREAT SERPL-MCNC: 0.8 MG/DL (ref 0.6–1.2)
GFR AFRICAN AMERICAN: >60
GFR NON-AFRICAN AMERICAN: >60
GLOBULIN: 2.4 G/DL
GLUCOSE BLD-MCNC: 115 MG/DL (ref 70–99)
HDLC SERPL-MCNC: 78 MG/DL (ref 40–60)
LDL CHOLESTEROL CALCULATED: 138 MG/DL
POTASSIUM SERPL-SCNC: 4 MMOL/L (ref 3.5–5.1)
SODIUM BLD-SCNC: 145 MMOL/L (ref 136–145)
TOTAL PROTEIN: 7.2 G/DL (ref 6.4–8.2)
TRIGL SERPL-MCNC: 142 MG/DL (ref 0–150)
VLDLC SERPL CALC-MCNC: 28 MG/DL

## 2018-05-31 PROCEDURE — 1036F TOBACCO NON-USER: CPT | Performed by: PHYSICIAN ASSISTANT

## 2018-05-31 PROCEDURE — 99213 OFFICE O/P EST LOW 20 MIN: CPT | Performed by: SURGERY

## 2018-05-31 PROCEDURE — 99212 OFFICE O/P EST SF 10 MIN: CPT | Performed by: PHYSICIAN ASSISTANT

## 2018-05-31 PROCEDURE — 1090F PRES/ABSN URINE INCON ASSESS: CPT | Performed by: SURGERY

## 2018-05-31 PROCEDURE — 4040F PNEUMOC VAC/ADMIN/RCVD: CPT | Performed by: SURGERY

## 2018-05-31 PROCEDURE — G8427 DOCREV CUR MEDS BY ELIG CLIN: HCPCS | Performed by: SURGERY

## 2018-05-31 PROCEDURE — G8417 CALC BMI ABV UP PARAM F/U: HCPCS | Performed by: SURGERY

## 2018-05-31 PROCEDURE — G8417 CALC BMI ABV UP PARAM F/U: HCPCS | Performed by: PHYSICIAN ASSISTANT

## 2018-05-31 PROCEDURE — 4040F PNEUMOC VAC/ADMIN/RCVD: CPT | Performed by: PHYSICIAN ASSISTANT

## 2018-05-31 PROCEDURE — 1036F TOBACCO NON-USER: CPT | Performed by: SURGERY

## 2018-05-31 PROCEDURE — 1090F PRES/ABSN URINE INCON ASSESS: CPT | Performed by: PHYSICIAN ASSISTANT

## 2018-05-31 PROCEDURE — 1123F ACP DISCUSS/DSCN MKR DOCD: CPT | Performed by: PHYSICIAN ASSISTANT

## 2018-05-31 PROCEDURE — 1123F ACP DISCUSS/DSCN MKR DOCD: CPT | Performed by: SURGERY

## 2018-05-31 PROCEDURE — G8427 DOCREV CUR MEDS BY ELIG CLIN: HCPCS | Performed by: PHYSICIAN ASSISTANT

## 2018-05-31 ASSESSMENT — ENCOUNTER SYMPTOMS: ABDOMINAL PAIN: 1

## 2018-06-04 ENCOUNTER — OFFICE VISIT (OUTPATIENT)
Dept: INTERNAL MEDICINE CLINIC | Age: 83
End: 2018-06-04

## 2018-06-04 VITALS
SYSTOLIC BLOOD PRESSURE: 126 MMHG | WEIGHT: 151 LBS | BODY MASS INDEX: 29.49 KG/M2 | DIASTOLIC BLOOD PRESSURE: 70 MMHG | OXYGEN SATURATION: 98 % | HEART RATE: 83 BPM

## 2018-06-04 DIAGNOSIS — E78.00 PURE HYPERCHOLESTEROLEMIA: Primary | ICD-10-CM

## 2018-06-04 DIAGNOSIS — K21.9 GASTROESOPHAGEAL REFLUX DISEASE WITHOUT ESOPHAGITIS: ICD-10-CM

## 2018-06-04 DIAGNOSIS — F41.9 ANXIETY: ICD-10-CM

## 2018-06-04 DIAGNOSIS — I10 ESSENTIAL HYPERTENSION: ICD-10-CM

## 2018-06-04 DIAGNOSIS — Z01.818 PRE-OP EVALUATION: ICD-10-CM

## 2018-06-04 PROCEDURE — 1036F TOBACCO NON-USER: CPT | Performed by: INTERNAL MEDICINE

## 2018-06-04 PROCEDURE — G8417 CALC BMI ABV UP PARAM F/U: HCPCS | Performed by: INTERNAL MEDICINE

## 2018-06-04 PROCEDURE — 99214 OFFICE O/P EST MOD 30 MIN: CPT | Performed by: INTERNAL MEDICINE

## 2018-06-04 PROCEDURE — 1090F PRES/ABSN URINE INCON ASSESS: CPT | Performed by: INTERNAL MEDICINE

## 2018-06-04 PROCEDURE — G8427 DOCREV CUR MEDS BY ELIG CLIN: HCPCS | Performed by: INTERNAL MEDICINE

## 2018-06-04 PROCEDURE — 1123F ACP DISCUSS/DSCN MKR DOCD: CPT | Performed by: INTERNAL MEDICINE

## 2018-06-04 PROCEDURE — 4040F PNEUMOC VAC/ADMIN/RCVD: CPT | Performed by: INTERNAL MEDICINE

## 2018-06-05 RX ORDER — LORAZEPAM 0.5 MG/1
TABLET ORAL
Qty: 60 TABLET | Refills: 0 | Status: SHIPPED | OUTPATIENT
Start: 2018-06-05 | End: 2018-07-03 | Stop reason: SDUPTHER

## 2018-06-08 ENCOUNTER — HOSPITAL ENCOUNTER (OUTPATIENT)
Dept: SURGERY | Age: 83
Discharge: HOME | End: 2018-06-08
Attending: SURGERY | Admitting: SURGERY

## 2018-06-08 VITALS
DIASTOLIC BLOOD PRESSURE: 65 MMHG | BODY MASS INDEX: 29.64 KG/M2 | HEART RATE: 65 BPM | RESPIRATION RATE: 18 BRPM | OXYGEN SATURATION: 97 % | SYSTOLIC BLOOD PRESSURE: 159 MMHG | HEIGHT: 60 IN | WEIGHT: 151 LBS | TEMPERATURE: 97.1 F

## 2018-06-08 DIAGNOSIS — K43.9 VENTRAL HERNIA WITHOUT OBSTRUCTION OR GANGRENE: Primary | ICD-10-CM

## 2018-06-08 LAB
HCT VFR BLD CALC: 41.4 % (ref 36–48)
HEMOGLOBIN: 14.2 G/DL (ref 12–16)
MCH RBC QN AUTO: 31.7 PG (ref 26–34)
MCHC RBC AUTO-ENTMCNC: 34.3 G/DL (ref 31–36)
MCV RBC AUTO: 92.4 FL (ref 80–100)
PDW BLD-RTO: 13.2 % (ref 12.4–15.4)
PLATELET # BLD: 197 K/UL (ref 135–450)
PMV BLD AUTO: 8.3 FL (ref 5–10.5)
RBC # BLD: 4.48 M/UL (ref 4–5.2)
WBC # BLD: 4.5 K/UL (ref 4–11)

## 2018-06-08 PROCEDURE — 49560 PR REPAIR INCISIONAL HERNIA,REDUCIBLE: CPT | Performed by: SURGERY

## 2018-06-08 PROCEDURE — 49568 PR IMPLANT MESH HERNIA REPAIR/DEBRIDEMENT CLOSURE: CPT | Performed by: SURGERY

## 2018-06-08 RX ORDER — ONDANSETRON 2 MG/ML
4 INJECTION INTRAMUSCULAR; INTRAVENOUS
Status: ACTIVE | OUTPATIENT
Start: 2018-06-08 | End: 2018-06-08

## 2018-06-08 RX ORDER — SODIUM CHLORIDE 0.9 % (FLUSH) 0.9 %
10 SYRINGE (ML) INJECTION EVERY 12 HOURS SCHEDULED
Status: DISCONTINUED | OUTPATIENT
Start: 2018-06-08 | End: 2018-06-09 | Stop reason: HOSPADM

## 2018-06-08 RX ORDER — FENTANYL CITRATE 50 UG/ML
50 INJECTION, SOLUTION INTRAMUSCULAR; INTRAVENOUS EVERY 5 MIN PRN
Status: DISCONTINUED | OUTPATIENT
Start: 2018-06-08 | End: 2018-06-09 | Stop reason: HOSPADM

## 2018-06-08 RX ORDER — FENTANYL CITRATE 50 UG/ML
25 INJECTION, SOLUTION INTRAMUSCULAR; INTRAVENOUS EVERY 5 MIN PRN
Status: DISCONTINUED | OUTPATIENT
Start: 2018-06-08 | End: 2018-06-09 | Stop reason: HOSPADM

## 2018-06-08 RX ORDER — TRAMADOL HYDROCHLORIDE 50 MG/1
50 TABLET ORAL
Status: COMPLETED | OUTPATIENT
Start: 2018-06-08 | End: 2018-06-08

## 2018-06-08 RX ORDER — PROMETHAZINE HYDROCHLORIDE 25 MG/ML
6.25 INJECTION, SOLUTION INTRAMUSCULAR; INTRAVENOUS
Status: ACTIVE | OUTPATIENT
Start: 2018-06-08 | End: 2018-06-08

## 2018-06-08 RX ORDER — SODIUM CHLORIDE 0.9 % (FLUSH) 0.9 %
10 SYRINGE (ML) INJECTION PRN
Status: DISCONTINUED | OUTPATIENT
Start: 2018-06-08 | End: 2018-06-09 | Stop reason: HOSPADM

## 2018-06-08 RX ORDER — SODIUM CHLORIDE 9 MG/ML
INJECTION, SOLUTION INTRAVENOUS CONTINUOUS
Status: DISCONTINUED | OUTPATIENT
Start: 2018-06-08 | End: 2018-06-09 | Stop reason: HOSPADM

## 2018-06-08 RX ORDER — TRAMADOL HYDROCHLORIDE 50 MG/1
50 TABLET ORAL EVERY 6 HOURS PRN
Qty: 28 TABLET | Refills: 0 | Status: SHIPPED | OUTPATIENT
Start: 2018-06-08 | End: 2018-06-15

## 2018-06-08 RX ADMIN — SODIUM CHLORIDE: 9 INJECTION, SOLUTION INTRAVENOUS at 08:45

## 2018-06-08 RX ADMIN — TRAMADOL HYDROCHLORIDE 50 MG: 50 TABLET ORAL at 10:58

## 2018-06-08 ASSESSMENT — PAIN DESCRIPTION - ORIENTATION: ORIENTATION: LEFT;UPPER

## 2018-06-08 ASSESSMENT — PAIN - FUNCTIONAL ASSESSMENT: PAIN_FUNCTIONAL_ASSESSMENT: 0-10

## 2018-06-08 ASSESSMENT — PAIN SCALES - GENERAL
PAINLEVEL_OUTOF10: 3
PAINLEVEL_OUTOF10: 4
PAINLEVEL_OUTOF10: 2
PAINLEVEL_OUTOF10: 4

## 2018-06-08 ASSESSMENT — PAIN DESCRIPTION - PAIN TYPE
TYPE: SURGICAL PAIN
TYPE: ACUTE PAIN;SURGICAL PAIN
TYPE: SURGICAL PAIN

## 2018-06-08 ASSESSMENT — PAIN DESCRIPTION - DESCRIPTORS: DESCRIPTORS: SORE;DISCOMFORT;ACHING

## 2018-06-08 ASSESSMENT — LIFESTYLE VARIABLES: SMOKING_STATUS: 0

## 2018-06-08 ASSESSMENT — PAIN DESCRIPTION - LOCATION: LOCATION: ABDOMEN

## 2018-06-11 RX ORDER — PANTOPRAZOLE SODIUM 40 MG/1
TABLET, DELAYED RELEASE ORAL
Qty: 30 TABLET | Refills: 0 | Status: SHIPPED | OUTPATIENT
Start: 2018-06-11 | End: 2018-07-03 | Stop reason: SDUPTHER

## 2018-06-15 ENCOUNTER — OFFICE VISIT (OUTPATIENT)
Dept: INTERNAL MEDICINE CLINIC | Age: 83
End: 2018-06-15

## 2018-06-15 VITALS
TEMPERATURE: 97.8 F | HEIGHT: 60 IN | OXYGEN SATURATION: 98 % | SYSTOLIC BLOOD PRESSURE: 138 MMHG | HEART RATE: 88 BPM | BODY MASS INDEX: 29.64 KG/M2 | WEIGHT: 151 LBS | DIASTOLIC BLOOD PRESSURE: 88 MMHG

## 2018-06-15 DIAGNOSIS — R30.0 DYSURIA: Primary | ICD-10-CM

## 2018-06-15 LAB
BILIRUBIN, POC: NORMAL
BLOOD URINE, POC: NORMAL
CLARITY, POC: CLEAR
COLOR, POC: YELLOW
GLUCOSE URINE, POC: NORMAL
KETONES, POC: NORMAL
LEUKOCYTE EST, POC: NORMAL
NITRITE, POC: NORMAL
PH, POC: 6
PROTEIN, POC: NORMAL
SPECIFIC GRAVITY, POC: 1.01
UROBILINOGEN, POC: 0.2

## 2018-06-15 PROCEDURE — G8417 CALC BMI ABV UP PARAM F/U: HCPCS | Performed by: NURSE PRACTITIONER

## 2018-06-15 PROCEDURE — 1090F PRES/ABSN URINE INCON ASSESS: CPT | Performed by: NURSE PRACTITIONER

## 2018-06-15 PROCEDURE — 4040F PNEUMOC VAC/ADMIN/RCVD: CPT | Performed by: NURSE PRACTITIONER

## 2018-06-15 PROCEDURE — 1036F TOBACCO NON-USER: CPT | Performed by: NURSE PRACTITIONER

## 2018-06-15 PROCEDURE — 99213 OFFICE O/P EST LOW 20 MIN: CPT | Performed by: NURSE PRACTITIONER

## 2018-06-15 PROCEDURE — G8427 DOCREV CUR MEDS BY ELIG CLIN: HCPCS | Performed by: NURSE PRACTITIONER

## 2018-06-15 PROCEDURE — 1123F ACP DISCUSS/DSCN MKR DOCD: CPT | Performed by: NURSE PRACTITIONER

## 2018-06-15 PROCEDURE — 81002 URINALYSIS NONAUTO W/O SCOPE: CPT | Performed by: NURSE PRACTITIONER

## 2018-06-15 RX ORDER — NITROFURANTOIN 25; 75 MG/1; MG/1
100 CAPSULE ORAL 2 TIMES DAILY
Qty: 14 CAPSULE | Refills: 0 | Status: SHIPPED | OUTPATIENT
Start: 2018-06-15 | End: 2018-06-22

## 2018-06-17 LAB
ORGANISM: ABNORMAL
ORGANISM: ABNORMAL
URINE CULTURE, ROUTINE: ABNORMAL

## 2018-06-18 ENCOUNTER — OFFICE VISIT (OUTPATIENT)
Dept: SURGERY | Age: 83
End: 2018-06-18

## 2018-06-18 DIAGNOSIS — K43.9 VENTRAL HERNIA WITHOUT OBSTRUCTION OR GANGRENE: Primary | ICD-10-CM

## 2018-06-18 PROCEDURE — 99024 POSTOP FOLLOW-UP VISIT: CPT | Performed by: SURGERY

## 2018-06-18 ASSESSMENT — ENCOUNTER SYMPTOMS
NAUSEA: 0
DIARRHEA: 0
ABDOMINAL PAIN: 0
SHORTNESS OF BREATH: 0
COUGH: 0
VOMITING: 0

## 2018-06-27 ENCOUNTER — TELEPHONE (OUTPATIENT)
Dept: INTERNAL MEDICINE CLINIC | Age: 83
End: 2018-06-27

## 2018-06-27 DIAGNOSIS — I15.9 SECONDARY HYPERTENSION: Primary | ICD-10-CM

## 2018-06-27 RX ORDER — HYDROCHLOROTHIAZIDE 25 MG/1
25 TABLET ORAL DAILY
Qty: 30 TABLET | Refills: 3 | Status: SHIPPED | OUTPATIENT
Start: 2018-06-27 | End: 2018-07-03

## 2018-06-29 ENCOUNTER — OFFICE VISIT (OUTPATIENT)
Dept: ORTHOPEDIC SURGERY | Age: 83
End: 2018-06-29

## 2018-06-29 VITALS
SYSTOLIC BLOOD PRESSURE: 132 MMHG | HEIGHT: 60 IN | TEMPERATURE: 97.2 F | RESPIRATION RATE: 16 BRPM | WEIGHT: 151 LBS | BODY MASS INDEX: 29.64 KG/M2 | HEART RATE: 96 BPM | DIASTOLIC BLOOD PRESSURE: 85 MMHG

## 2018-06-29 DIAGNOSIS — Z96.651 STATUS POST TOTAL RIGHT KNEE REPLACEMENT: ICD-10-CM

## 2018-06-29 DIAGNOSIS — M25.561 ACUTE PAIN OF RIGHT KNEE: Primary | ICD-10-CM

## 2018-06-29 PROCEDURE — G8417 CALC BMI ABV UP PARAM F/U: HCPCS | Performed by: PHYSICIAN ASSISTANT

## 2018-06-29 PROCEDURE — 1123F ACP DISCUSS/DSCN MKR DOCD: CPT | Performed by: PHYSICIAN ASSISTANT

## 2018-06-29 PROCEDURE — 4040F PNEUMOC VAC/ADMIN/RCVD: CPT | Performed by: PHYSICIAN ASSISTANT

## 2018-06-29 PROCEDURE — G8427 DOCREV CUR MEDS BY ELIG CLIN: HCPCS | Performed by: PHYSICIAN ASSISTANT

## 2018-06-29 PROCEDURE — 1036F TOBACCO NON-USER: CPT | Performed by: PHYSICIAN ASSISTANT

## 2018-06-29 PROCEDURE — 99213 OFFICE O/P EST LOW 20 MIN: CPT | Performed by: PHYSICIAN ASSISTANT

## 2018-06-29 PROCEDURE — 1090F PRES/ABSN URINE INCON ASSESS: CPT | Performed by: PHYSICIAN ASSISTANT

## 2018-06-29 NOTE — LETTER
[facility]  [address] [facility]  [address] [facility] [address]  Phone: [phone]  Fax: [phone]    Lilian Coleman, [address]        June 29, 2018     Patient: Nick Erazo   YOB: 1929   Date of Visit: 6/29/2018       To Whom It May Concern: It is my medical opinion that Nick Erazo has a stable right knee replacement. I am suggesting continued use of hose, tylenol and ad 80 mg of aspirin a day. Exercise bike 20 minutes a day or strait leg lifts daily will help. Lastly, stretching the back of the knee twice a day. .    If you have any questions or concerns, please don't hesitate to call.     Sincerely,          BLAIR Goode

## 2018-07-02 ENCOUNTER — TELEPHONE (OUTPATIENT)
Dept: INTERNAL MEDICINE CLINIC | Age: 83
End: 2018-07-02

## 2018-07-03 ENCOUNTER — OFFICE VISIT (OUTPATIENT)
Dept: INTERNAL MEDICINE CLINIC | Age: 83
End: 2018-07-03

## 2018-07-03 VITALS
SYSTOLIC BLOOD PRESSURE: 138 MMHG | HEART RATE: 94 BPM | BODY MASS INDEX: 29.45 KG/M2 | WEIGHT: 150 LBS | OXYGEN SATURATION: 98 % | TEMPERATURE: 98.1 F | HEIGHT: 60 IN | DIASTOLIC BLOOD PRESSURE: 64 MMHG

## 2018-07-03 DIAGNOSIS — R53.1 WEAKNESS: ICD-10-CM

## 2018-07-03 DIAGNOSIS — F41.9 ANXIETY: Primary | ICD-10-CM

## 2018-07-03 DIAGNOSIS — N30.00 ACUTE CYSTITIS WITHOUT HEMATURIA: Primary | ICD-10-CM

## 2018-07-03 DIAGNOSIS — E78.00 PURE HYPERCHOLESTEROLEMIA: ICD-10-CM

## 2018-07-03 LAB
BILIRUBIN, POC: NEGATIVE
BLOOD URINE, POC: NEGATIVE
CLARITY, POC: NORMAL
COLOR, POC: NORMAL
GLUCOSE URINE, POC: NEGATIVE
KETONES, POC: NEGATIVE
LEUKOCYTE EST, POC: NEGATIVE
NITRITE, POC: NEGATIVE
PH, POC: 6
PROTEIN, POC: NEGATIVE
SPECIFIC GRAVITY, POC: <=1.005
UROBILINOGEN, POC: 0.2

## 2018-07-03 PROCEDURE — 99214 OFFICE O/P EST MOD 30 MIN: CPT | Performed by: INTERNAL MEDICINE

## 2018-07-03 PROCEDURE — 1036F TOBACCO NON-USER: CPT | Performed by: INTERNAL MEDICINE

## 2018-07-03 PROCEDURE — 1090F PRES/ABSN URINE INCON ASSESS: CPT | Performed by: INTERNAL MEDICINE

## 2018-07-03 PROCEDURE — G8427 DOCREV CUR MEDS BY ELIG CLIN: HCPCS | Performed by: INTERNAL MEDICINE

## 2018-07-03 PROCEDURE — 4040F PNEUMOC VAC/ADMIN/RCVD: CPT | Performed by: INTERNAL MEDICINE

## 2018-07-03 PROCEDURE — 81002 URINALYSIS NONAUTO W/O SCOPE: CPT | Performed by: INTERNAL MEDICINE

## 2018-07-03 PROCEDURE — G8417 CALC BMI ABV UP PARAM F/U: HCPCS | Performed by: INTERNAL MEDICINE

## 2018-07-03 PROCEDURE — 1123F ACP DISCUSS/DSCN MKR DOCD: CPT | Performed by: INTERNAL MEDICINE

## 2018-07-03 RX ORDER — PANTOPRAZOLE SODIUM 40 MG/1
TABLET, DELAYED RELEASE ORAL
Qty: 30 TABLET | Refills: 3 | Status: SHIPPED | OUTPATIENT
Start: 2018-07-03 | End: 2018-11-08 | Stop reason: SDUPTHER

## 2018-07-03 RX ORDER — LORAZEPAM 0.5 MG/1
0.5 TABLET ORAL 3 TIMES DAILY PRN
Qty: 60 TABLET | Refills: 0 | Status: SHIPPED | OUTPATIENT
Start: 2018-07-03 | End: 2018-08-01 | Stop reason: SDUPTHER

## 2018-07-03 ASSESSMENT — ENCOUNTER SYMPTOMS
ABDOMINAL PAIN: 0
SHORTNESS OF BREATH: 0

## 2018-08-01 DIAGNOSIS — F41.9 ANXIETY: ICD-10-CM

## 2018-08-01 RX ORDER — LORAZEPAM 0.5 MG/1
TABLET ORAL
Qty: 60 TABLET | Refills: 0 | Status: SHIPPED | OUTPATIENT
Start: 2018-08-01 | End: 2018-08-27 | Stop reason: SDUPTHER

## 2018-08-09 ENCOUNTER — OFFICE VISIT (OUTPATIENT)
Dept: INTERNAL MEDICINE CLINIC | Age: 83
End: 2018-08-09

## 2018-08-09 VITALS
DIASTOLIC BLOOD PRESSURE: 78 MMHG | BODY MASS INDEX: 29.1 KG/M2 | HEART RATE: 88 BPM | OXYGEN SATURATION: 98 % | WEIGHT: 149 LBS | SYSTOLIC BLOOD PRESSURE: 138 MMHG

## 2018-08-09 DIAGNOSIS — R07.2 PRECORDIAL PAIN: ICD-10-CM

## 2018-08-09 DIAGNOSIS — Z09 HOSPITAL DISCHARGE FOLLOW-UP: Primary | ICD-10-CM

## 2018-08-09 DIAGNOSIS — R61 DIAPHORESIS: ICD-10-CM

## 2018-08-09 DIAGNOSIS — G47.9 DIFFICULTY SLEEPING: ICD-10-CM

## 2018-08-09 PROCEDURE — 99214 OFFICE O/P EST MOD 30 MIN: CPT | Performed by: INTERNAL MEDICINE

## 2018-08-09 ASSESSMENT — ENCOUNTER SYMPTOMS
VOMITING: 0
DIARRHEA: 0
SHORTNESS OF BREATH: 0
ABDOMINAL PAIN: 0
NAUSEA: 0
SORE THROAT: 0
WHEEZING: 0
TROUBLE SWALLOWING: 0

## 2018-08-09 ASSESSMENT — PATIENT HEALTH QUESTIONNAIRE - PHQ9
2. FEELING DOWN, DEPRESSED OR HOPELESS: 0
SUM OF ALL RESPONSES TO PHQ9 QUESTIONS 1 & 2: 0
SUM OF ALL RESPONSES TO PHQ QUESTIONS 1-9: 0
1. LITTLE INTEREST OR PLEASURE IN DOING THINGS: 0
SUM OF ALL RESPONSES TO PHQ QUESTIONS 1-9: 0
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
## 2018-08-27 DIAGNOSIS — F41.9 ANXIETY: ICD-10-CM

## 2018-08-27 RX ORDER — LORAZEPAM 0.5 MG/1
TABLET ORAL
Qty: 60 TABLET | Refills: 2 | Status: SHIPPED | OUTPATIENT
Start: 2018-08-27 | End: 2018-08-27 | Stop reason: SDUPTHER

## 2018-08-27 RX ORDER — LORAZEPAM 0.5 MG/1
TABLET ORAL
Qty: 60 TABLET | Refills: 2 | Status: SHIPPED | OUTPATIENT
Start: 2018-08-27 | End: 2018-11-08 | Stop reason: SDUPTHER

## 2018-08-29 ASSESSMENT — ENCOUNTER SYMPTOMS
SHORTNESS OF BREATH: 0
ABDOMINAL PAIN: 0

## 2018-08-30 DIAGNOSIS — E78.00 PURE HYPERCHOLESTEROLEMIA: ICD-10-CM

## 2018-08-30 LAB
A/G RATIO: 2 (ref 1.1–2.2)
ALBUMIN SERPL-MCNC: 4.3 G/DL (ref 3.4–5)
ALP BLD-CCNC: 56 U/L (ref 40–129)
ALT SERPL-CCNC: 12 U/L (ref 10–40)
ANION GAP SERPL CALCULATED.3IONS-SCNC: 10 MMOL/L (ref 3–16)
AST SERPL-CCNC: 19 U/L (ref 15–37)
BILIRUB SERPL-MCNC: 0.5 MG/DL (ref 0–1)
BUN BLDV-MCNC: 19 MG/DL (ref 7–20)
CALCIUM SERPL-MCNC: 9.9 MG/DL (ref 8.3–10.6)
CHLORIDE BLD-SCNC: 104 MMOL/L (ref 99–110)
CHOLESTEROL, TOTAL: 212 MG/DL (ref 0–199)
CO2: 28 MMOL/L (ref 21–32)
CREAT SERPL-MCNC: 0.8 MG/DL (ref 0.6–1.2)
GFR AFRICAN AMERICAN: >60
GFR NON-AFRICAN AMERICAN: >60
GLOBULIN: 2.2 G/DL
GLUCOSE BLD-MCNC: 99 MG/DL (ref 70–99)
HDLC SERPL-MCNC: 65 MG/DL (ref 40–60)
LDL CHOLESTEROL CALCULATED: 131 MG/DL
POTASSIUM SERPL-SCNC: 4.4 MMOL/L (ref 3.5–5.1)
SODIUM BLD-SCNC: 142 MMOL/L (ref 136–145)
TOTAL PROTEIN: 6.5 G/DL (ref 6.4–8.2)
TRIGL SERPL-MCNC: 80 MG/DL (ref 0–150)
VLDLC SERPL CALC-MCNC: 16 MG/DL

## 2018-09-05 ENCOUNTER — OFFICE VISIT (OUTPATIENT)
Dept: INTERNAL MEDICINE CLINIC | Age: 83
End: 2018-09-05

## 2018-09-05 VITALS
DIASTOLIC BLOOD PRESSURE: 78 MMHG | TEMPERATURE: 97.6 F | RESPIRATION RATE: 16 BRPM | HEIGHT: 60 IN | SYSTOLIC BLOOD PRESSURE: 112 MMHG | HEART RATE: 76 BPM | OXYGEN SATURATION: 96 % | WEIGHT: 149 LBS | BODY MASS INDEX: 29.25 KG/M2

## 2018-09-05 DIAGNOSIS — R07.2 PRECORDIAL PAIN: Primary | ICD-10-CM

## 2018-09-05 DIAGNOSIS — Z23 NEED FOR INFLUENZA VACCINATION: ICD-10-CM

## 2018-09-05 DIAGNOSIS — F41.9 ANXIETY: ICD-10-CM

## 2018-09-05 DIAGNOSIS — I10 ESSENTIAL HYPERTENSION: ICD-10-CM

## 2018-09-05 PROCEDURE — 1036F TOBACCO NON-USER: CPT | Performed by: INTERNAL MEDICINE

## 2018-09-05 PROCEDURE — 1090F PRES/ABSN URINE INCON ASSESS: CPT | Performed by: INTERNAL MEDICINE

## 2018-09-05 PROCEDURE — 90662 IIV NO PRSV INCREASED AG IM: CPT | Performed by: INTERNAL MEDICINE

## 2018-09-05 PROCEDURE — G8417 CALC BMI ABV UP PARAM F/U: HCPCS | Performed by: INTERNAL MEDICINE

## 2018-09-05 PROCEDURE — 1101F PT FALLS ASSESS-DOCD LE1/YR: CPT | Performed by: INTERNAL MEDICINE

## 2018-09-05 PROCEDURE — G8427 DOCREV CUR MEDS BY ELIG CLIN: HCPCS | Performed by: INTERNAL MEDICINE

## 2018-09-05 PROCEDURE — 1123F ACP DISCUSS/DSCN MKR DOCD: CPT | Performed by: INTERNAL MEDICINE

## 2018-09-05 PROCEDURE — 4040F PNEUMOC VAC/ADMIN/RCVD: CPT | Performed by: INTERNAL MEDICINE

## 2018-09-05 PROCEDURE — 99214 OFFICE O/P EST MOD 30 MIN: CPT | Performed by: INTERNAL MEDICINE

## 2018-09-05 PROCEDURE — G0008 ADMIN INFLUENZA VIRUS VAC: HCPCS | Performed by: INTERNAL MEDICINE

## 2018-09-05 NOTE — PROGRESS NOTES
Vaccine Information Sheet, \"Influenza - Inactivated\"  given to Ebenezer Sen, or parent/legal guardian of  Ebenezer Sen and verbalized understanding. Patient responses:    Have you ever had a reaction to a flu vaccine? No  Are you able to eat eggs without adverse effects? Yes  Do you have any current illness? No  Have you ever had Guillian Aspers Syndrome? No    Flu vaccine given per order. Please see immunization tab.

## 2018-11-08 DIAGNOSIS — F41.9 ANXIETY: ICD-10-CM

## 2018-11-08 RX ORDER — LORAZEPAM 0.5 MG/1
TABLET ORAL
Qty: 90 TABLET | Refills: 2 | Status: SHIPPED | OUTPATIENT
Start: 2018-11-08 | End: 2019-02-06

## 2018-11-08 RX ORDER — PANTOPRAZOLE SODIUM 40 MG/1
TABLET, DELAYED RELEASE ORAL
Qty: 30 TABLET | Refills: 2 | Status: SHIPPED | OUTPATIENT
Start: 2018-11-08 | End: 2019-02-15 | Stop reason: SDUPTHER

## 2018-11-30 PROBLEM — R73.09 ABNORMAL GLUCOSE: Status: ACTIVE | Noted: 2018-11-30

## 2019-01-28 ENCOUNTER — APPOINTMENT (OUTPATIENT)
Dept: GENERAL RADIOLOGY | Age: 84
End: 2019-01-28
Payer: MEDICARE

## 2019-01-28 ENCOUNTER — APPOINTMENT (OUTPATIENT)
Dept: CT IMAGING | Age: 84
End: 2019-01-28
Payer: MEDICARE

## 2019-01-28 ENCOUNTER — HOSPITAL ENCOUNTER (EMERGENCY)
Age: 84
Discharge: HOME | End: 2019-01-28
Attending: EMERGENCY MEDICINE
Payer: MEDICARE

## 2019-01-28 VITALS
HEART RATE: 78 BPM | TEMPERATURE: 97.8 F | OXYGEN SATURATION: 97 % | RESPIRATION RATE: 16 BRPM | SYSTOLIC BLOOD PRESSURE: 150 MMHG | DIASTOLIC BLOOD PRESSURE: 84 MMHG

## 2019-01-28 DIAGNOSIS — M25.562 ACUTE PAIN OF LEFT KNEE: ICD-10-CM

## 2019-01-28 DIAGNOSIS — M17.12 ARTHRITIS OF LEFT KNEE: Primary | ICD-10-CM

## 2019-01-28 LAB
A/G RATIO: 1.4 (ref 1.1–2.2)
ALBUMIN SERPL-MCNC: 4.2 G/DL (ref 3.4–5)
ALP BLD-CCNC: 61 U/L (ref 40–129)
ALT SERPL-CCNC: 11 U/L (ref 10–40)
ANION GAP SERPL CALCULATED.3IONS-SCNC: 12 MMOL/L (ref 3–16)
AST SERPL-CCNC: 18 U/L (ref 15–37)
BASOPHILS ABSOLUTE: 0 K/UL (ref 0–0.2)
BASOPHILS RELATIVE PERCENT: 0.3 %
BILIRUB SERPL-MCNC: 0.4 MG/DL (ref 0–1)
BUN BLDV-MCNC: 15 MG/DL (ref 7–20)
CALCIUM SERPL-MCNC: 9.8 MG/DL (ref 8.3–10.6)
CHLORIDE BLD-SCNC: 107 MMOL/L (ref 99–110)
CO2: 25 MMOL/L (ref 21–32)
CREAT SERPL-MCNC: 0.6 MG/DL (ref 0.6–1.2)
EKG ATRIAL RATE: 79 BPM
EKG DIAGNOSIS: NORMAL
EKG P AXIS: 28 DEGREES
EKG P-R INTERVAL: 160 MS
EKG Q-T INTERVAL: 356 MS
EKG QRS DURATION: 72 MS
EKG QTC CALCULATION (BAZETT): 408 MS
EKG R AXIS: -9 DEGREES
EKG T AXIS: -4 DEGREES
EKG VENTRICULAR RATE: 79 BPM
EOSINOPHILS ABSOLUTE: 0 K/UL (ref 0–0.6)
EOSINOPHILS RELATIVE PERCENT: 0.4 %
GFR AFRICAN AMERICAN: >60
GFR NON-AFRICAN AMERICAN: >60
GLOBULIN: 3 G/DL
GLUCOSE BLD-MCNC: 136 MG/DL (ref 70–99)
HCT VFR BLD CALC: 42.4 % (ref 36–48)
HEMOGLOBIN: 14.3 G/DL (ref 12–16)
LYMPHOCYTES ABSOLUTE: 0.7 K/UL (ref 1–5.1)
LYMPHOCYTES RELATIVE PERCENT: 13.5 %
MCH RBC QN AUTO: 31.4 PG (ref 26–34)
MCHC RBC AUTO-ENTMCNC: 33.7 G/DL (ref 31–36)
MCV RBC AUTO: 93.1 FL (ref 80–100)
MONOCYTES ABSOLUTE: 0.5 K/UL (ref 0–1.3)
MONOCYTES RELATIVE PERCENT: 10 %
NEUTROPHILS ABSOLUTE: 3.9 K/UL (ref 1.7–7.7)
NEUTROPHILS RELATIVE PERCENT: 75.8 %
PDW BLD-RTO: 13 % (ref 12.4–15.4)
PLATELET # BLD: 216 K/UL (ref 135–450)
PMV BLD AUTO: 8.6 FL (ref 5–10.5)
POTASSIUM REFLEX MAGNESIUM: 4 MMOL/L (ref 3.5–5.1)
PRO-BNP: 192 PG/ML (ref 0–449)
RBC # BLD: 4.55 M/UL (ref 4–5.2)
SODIUM BLD-SCNC: 144 MMOL/L (ref 136–145)
TOTAL PROTEIN: 7.2 G/DL (ref 6.4–8.2)
TROPONIN: <0.01 NG/ML
WBC # BLD: 5.1 K/UL (ref 4–11)

## 2019-01-28 PROCEDURE — 93010 ELECTROCARDIOGRAM REPORT: CPT | Performed by: INTERNAL MEDICINE

## 2019-01-28 PROCEDURE — 93005 ELECTROCARDIOGRAM TRACING: CPT | Performed by: EMERGENCY MEDICINE

## 2019-01-28 PROCEDURE — 73560 X-RAY EXAM OF KNEE 1 OR 2: CPT

## 2019-01-28 PROCEDURE — 73700 CT LOWER EXTREMITY W/O DYE: CPT

## 2019-01-28 PROCEDURE — 99284 EMERGENCY DEPT VISIT MOD MDM: CPT

## 2019-01-28 PROCEDURE — 84484 ASSAY OF TROPONIN QUANT: CPT

## 2019-01-28 PROCEDURE — 83880 ASSAY OF NATRIURETIC PEPTIDE: CPT

## 2019-01-28 PROCEDURE — 85025 COMPLETE CBC W/AUTO DIFF WBC: CPT

## 2019-01-28 PROCEDURE — 6370000000 HC RX 637 (ALT 250 FOR IP): Performed by: EMERGENCY MEDICINE

## 2019-01-28 PROCEDURE — 71046 X-RAY EXAM CHEST 2 VIEWS: CPT

## 2019-01-28 PROCEDURE — 80053 COMPREHEN METABOLIC PANEL: CPT

## 2019-01-28 RX ORDER — ACETAMINOPHEN 500 MG
500 TABLET ORAL ONCE
Status: COMPLETED | OUTPATIENT
Start: 2019-01-28 | End: 2019-01-28

## 2019-01-28 RX ORDER — LORAZEPAM 0.5 MG/1
0.5 TABLET ORAL ONCE
Status: COMPLETED | OUTPATIENT
Start: 2019-01-28 | End: 2019-01-28

## 2019-01-28 RX ORDER — IBUPROFEN 400 MG/1
400 TABLET ORAL EVERY 6 HOURS PRN
Qty: 20 TABLET | Refills: 0 | Status: SHIPPED | OUTPATIENT
Start: 2019-01-28 | End: 2020-01-06

## 2019-01-28 RX ORDER — IBUPROFEN 400 MG/1
400 TABLET ORAL ONCE
Status: COMPLETED | OUTPATIENT
Start: 2019-01-28 | End: 2019-01-28

## 2019-01-28 RX ADMIN — LORAZEPAM 0.5 MG: 0.5 TABLET ORAL at 12:26

## 2019-01-28 RX ADMIN — ACETAMINOPHEN 500 MG: 500 TABLET ORAL at 11:51

## 2019-01-28 RX ADMIN — IBUPROFEN 400 MG: 400 TABLET, FILM COATED ORAL at 11:52

## 2019-01-28 ASSESSMENT — PAIN SCALES - GENERAL
PAINLEVEL_OUTOF10: 7
PAINLEVEL_OUTOF10: 5

## 2019-01-28 ASSESSMENT — PAIN DESCRIPTION - PAIN TYPE: TYPE: ACUTE PAIN

## 2019-01-28 ASSESSMENT — PAIN DESCRIPTION - DESCRIPTORS: DESCRIPTORS: ACHING

## 2019-07-02 ENCOUNTER — OFFICE VISIT (OUTPATIENT)
Dept: ORTHOPEDIC SURGERY | Age: 84
End: 2019-07-02
Payer: MEDICARE

## 2019-07-02 VITALS
WEIGHT: 146 LBS | HEART RATE: 89 BPM | HEIGHT: 60 IN | BODY MASS INDEX: 28.66 KG/M2 | DIASTOLIC BLOOD PRESSURE: 84 MMHG | TEMPERATURE: 98.8 F | SYSTOLIC BLOOD PRESSURE: 143 MMHG

## 2019-07-02 DIAGNOSIS — M17.12 PRIMARY OSTEOARTHRITIS OF LEFT KNEE: ICD-10-CM

## 2019-07-02 DIAGNOSIS — Z96.651 STATUS POST TOTAL RIGHT KNEE REPLACEMENT: Primary | ICD-10-CM

## 2019-07-02 PROCEDURE — 1123F ACP DISCUSS/DSCN MKR DOCD: CPT | Performed by: PHYSICIAN ASSISTANT

## 2019-07-02 PROCEDURE — G8417 CALC BMI ABV UP PARAM F/U: HCPCS | Performed by: PHYSICIAN ASSISTANT

## 2019-07-02 PROCEDURE — 99214 OFFICE O/P EST MOD 30 MIN: CPT | Performed by: PHYSICIAN ASSISTANT

## 2019-07-02 PROCEDURE — 1090F PRES/ABSN URINE INCON ASSESS: CPT | Performed by: PHYSICIAN ASSISTANT

## 2019-07-02 PROCEDURE — 1036F TOBACCO NON-USER: CPT | Performed by: PHYSICIAN ASSISTANT

## 2019-07-02 PROCEDURE — G8427 DOCREV CUR MEDS BY ELIG CLIN: HCPCS | Performed by: PHYSICIAN ASSISTANT

## 2019-07-02 PROCEDURE — 4040F PNEUMOC VAC/ADMIN/RCVD: CPT | Performed by: PHYSICIAN ASSISTANT

## 2019-07-03 NOTE — PROGRESS NOTES
moderate osteoarthritic findings of the left knee noted with grade 3 arthritic changes of the medial and patellofemoral compartments. No acute fractures or dislocations noted. Assessment:       ICD-10-CM    1. Status post total right knee replacement-6. 9.2009 Z96.651 XR Knee Bilateral Standing     XR KNEE RIGHT (1-2 VIEWS)   2. Primary osteoarthritis of left knee M17.12 XR Knee Bilateral Standing     XR KNEE LEFT (1-2 VIEWS)        Plan:     Clinically and radiographically stable right knee arthroplasty. Moderate degenerative arthritis of the left knee. Weight loss, activity modification, home exercise therapy program, NSAID'S, dietary changes have been discussed as a means to help control the symptoms. Non surgical options including cortisone injection, Visco supplementation injection,  Coolief (cooled frequency ablation of the geniculate nerves), stem cell injections, PRP injections were discussed. Surgical option, arthroplasty discussed. The natural history of the patient's diagnosis as well as the treatment options were discussed in full and questions were answered. Risks and benefits of the treatment options also reviewed in detail. Continue with a HEP-  A home exercise program was re-instructed today including ROM exercises and strengthening exercises. The patient verbalized understanding of these exercises as well as the importance of the exercise program to promote return of normal function. If pain intensifies or other problems arise you are to notify the office. Prophylactic antibiotics for any surgical or dental procedures. This is recommended for lifetime. At this time she is not interested in any treatment for her left knee symptoms. Follow up yearly with x ray and clinical evaluations. Call or return to clinic prn if these symptoms worsen or fail to improve as anticipated.

## 2019-09-03 ENCOUNTER — HOSPITAL ENCOUNTER (EMERGENCY)
Age: 84
Discharge: HOME | End: 2019-09-03
Payer: MEDICARE

## 2019-09-03 ENCOUNTER — APPOINTMENT (OUTPATIENT)
Dept: GENERAL RADIOLOGY | Age: 84
End: 2019-09-03
Payer: MEDICARE

## 2019-09-03 ENCOUNTER — APPOINTMENT (OUTPATIENT)
Dept: CT IMAGING | Age: 84
End: 2019-09-03
Payer: MEDICARE

## 2019-09-03 VITALS
DIASTOLIC BLOOD PRESSURE: 75 MMHG | HEART RATE: 71 BPM | SYSTOLIC BLOOD PRESSURE: 183 MMHG | RESPIRATION RATE: 16 BRPM | TEMPERATURE: 97.9 F | OXYGEN SATURATION: 96 %

## 2019-09-03 DIAGNOSIS — R42 LIGHTHEADEDNESS: Primary | ICD-10-CM

## 2019-09-03 LAB
ANION GAP SERPL CALCULATED.3IONS-SCNC: 15 MMOL/L (ref 3–16)
BASOPHILS ABSOLUTE: 0 K/UL (ref 0–0.2)
BASOPHILS RELATIVE PERCENT: 0.5 %
BILIRUBIN URINE: NEGATIVE
BLOOD, URINE: NEGATIVE
BUN BLDV-MCNC: 16 MG/DL (ref 7–20)
CALCIUM SERPL-MCNC: 10.3 MG/DL (ref 8.3–10.6)
CHLORIDE BLD-SCNC: 102 MMOL/L (ref 99–110)
CHP ED QC CHECK: YES
CLARITY: CLEAR
CO2: 26 MMOL/L (ref 21–32)
COLOR: YELLOW
CREAT SERPL-MCNC: 0.7 MG/DL (ref 0.6–1.2)
EOSINOPHILS ABSOLUTE: 0 K/UL (ref 0–0.6)
EOSINOPHILS RELATIVE PERCENT: 0.7 %
GFR AFRICAN AMERICAN: >60
GFR NON-AFRICAN AMERICAN: >60
GLUCOSE BLD-MCNC: 105 MG/DL
GLUCOSE BLD-MCNC: 105 MG/DL (ref 70–99)
GLUCOSE BLD-MCNC: 108 MG/DL (ref 70–99)
GLUCOSE URINE: NEGATIVE MG/DL
HCT VFR BLD CALC: 42.2 % (ref 36–48)
HEMOGLOBIN: 14.2 G/DL (ref 12–16)
KETONES, URINE: NEGATIVE MG/DL
LEUKOCYTE ESTERASE, URINE: NEGATIVE
LYMPHOCYTES ABSOLUTE: 1.2 K/UL (ref 1–5.1)
LYMPHOCYTES RELATIVE PERCENT: 23.4 %
MCH RBC QN AUTO: 31.5 PG (ref 26–34)
MCHC RBC AUTO-ENTMCNC: 33.6 G/DL (ref 31–36)
MCV RBC AUTO: 93.6 FL (ref 80–100)
MICROSCOPIC EXAMINATION: NORMAL
MONOCYTES ABSOLUTE: 0.5 K/UL (ref 0–1.3)
MONOCYTES RELATIVE PERCENT: 10.6 %
NEUTROPHILS ABSOLUTE: 3.3 K/UL (ref 1.7–7.7)
NEUTROPHILS RELATIVE PERCENT: 64.8 %
NITRITE, URINE: NEGATIVE
PDW BLD-RTO: 13.1 % (ref 12.4–15.4)
PERFORMED ON: ABNORMAL
PH UA: 6 (ref 5–8)
PLATELET # BLD: 211 K/UL (ref 135–450)
PMV BLD AUTO: 8.5 FL (ref 5–10.5)
POTASSIUM REFLEX MAGNESIUM: 4.1 MMOL/L (ref 3.5–5.1)
PROTEIN UA: NEGATIVE MG/DL
RBC # BLD: 4.51 M/UL (ref 4–5.2)
SODIUM BLD-SCNC: 143 MMOL/L (ref 136–145)
SPECIFIC GRAVITY UA: 1.01 (ref 1–1.03)
URINE REFLEX TO CULTURE: NORMAL
URINE TYPE: NORMAL
UROBILINOGEN, URINE: 0.2 E.U./DL
WBC # BLD: 5 K/UL (ref 4–11)

## 2019-09-03 PROCEDURE — 70450 CT HEAD/BRAIN W/O DYE: CPT

## 2019-09-03 PROCEDURE — 36415 COLL VENOUS BLD VENIPUNCTURE: CPT

## 2019-09-03 PROCEDURE — 85025 COMPLETE CBC W/AUTO DIFF WBC: CPT

## 2019-09-03 PROCEDURE — 80048 BASIC METABOLIC PNL TOTAL CA: CPT

## 2019-09-03 PROCEDURE — 71046 X-RAY EXAM CHEST 2 VIEWS: CPT

## 2019-09-03 PROCEDURE — 81003 URINALYSIS AUTO W/O SCOPE: CPT

## 2019-09-03 PROCEDURE — 93005 ELECTROCARDIOGRAM TRACING: CPT | Performed by: EMERGENCY MEDICINE

## 2019-09-03 PROCEDURE — 99284 EMERGENCY DEPT VISIT MOD MDM: CPT

## 2019-09-03 RX ORDER — 0.9 % SODIUM CHLORIDE 0.9 %
500 INTRAVENOUS SOLUTION INTRAVENOUS ONCE
Status: DISCONTINUED | OUTPATIENT
Start: 2019-09-03 | End: 2019-09-03

## 2019-09-03 ASSESSMENT — PAIN DESCRIPTION - FREQUENCY: FREQUENCY: CONTINUOUS

## 2019-09-03 ASSESSMENT — PAIN DESCRIPTION - LOCATION: LOCATION: HEAD

## 2019-09-03 ASSESSMENT — PAIN DESCRIPTION - DESCRIPTORS: DESCRIPTORS: ACHING

## 2019-09-03 ASSESSMENT — PAIN SCALES - GENERAL
PAINLEVEL_OUTOF10: 0
PAINLEVEL_OUTOF10: 2

## 2019-09-03 ASSESSMENT — PAIN DESCRIPTION - ORIENTATION: ORIENTATION: ANTERIOR

## 2019-09-03 ASSESSMENT — PAIN DESCRIPTION - PAIN TYPE: TYPE: ACUTE PAIN

## 2019-09-04 LAB
EKG ATRIAL RATE: 77 BPM
EKG DIAGNOSIS: NORMAL
EKG P AXIS: 84 DEGREES
EKG P-R INTERVAL: 160 MS
EKG Q-T INTERVAL: 354 MS
EKG QRS DURATION: 72 MS
EKG QTC CALCULATION (BAZETT): 400 MS
EKG R AXIS: -8 DEGREES
EKG T AXIS: -3 DEGREES
EKG VENTRICULAR RATE: 77 BPM

## 2019-09-04 PROCEDURE — 93010 ELECTROCARDIOGRAM REPORT: CPT | Performed by: INTERNAL MEDICINE

## 2019-09-04 NOTE — ED NOTES
Pt refusing IV access. Avi Murillo NP made aware and stated it was ok.       Angela Jones RN  09/03/19 [id number]

## 2019-09-04 NOTE — ED PROVIDER NOTES
essential=pt denies    Macular degeneration     UTI (urinary tract infection)          SURGICAL HISTORY     Past Surgical History:   Procedure Laterality Date    CATARACT REMOVAL  2008    COLONOSCOPY      12/17/2009    HERNIA REPAIR Left 06/08/2018    repair of ventral hernia with mesh    HYSTERECTOMY  1980    JOINT REPLACEMENT Right     knee    TOTAL KNEE ARTHROPLASTY      rt 6/9/2009   dr Sergio Alvarado   [address] [facility], [facility] left. x3         CURRENTMEDICATIONS       Discharge Medication List as of 9/3/2019 10:17 PM      CONTINUE these medications which have NOT CHANGED    Details   potassium chloride (KLOR-CON M10) 10 MEQ extended release tablet TAKE ONE TABLET BY MOUTH DAILY, Disp-90 tablet, R-3Normal      LORazepam (ATIVAN) 0.5 MG tablet TAKE ONE TABLET BY MOUTH THREE TIMES A DAY AS NEEDED FOR ANXIETY, Disp-90 tablet, R-2Normal      furosemide (LASIX) 20 MG tablet TAKE ONE TABLET BY MOUTH EVERY MORNING, Disp-90 tablet, R-1Normal      pantoprazole (PROTONIX) 40 MG tablet TAKE ONE TABLET BY MOUTH EVERY MORNING, Disp-90 tablet, R-3Normal      ibuprofen (IBU) 400 MG tablet Take 1 tablet by mouth every 6 hours as needed for Pain, Disp-20 tablet, R-0Print      aspirin 81 MG tablet Take 81 mg by mouth dailyHistorical Med      Multiple Vitamins-Minerals (PRESERVISION AREDS 2) CAPS Take 2 capsules by mouth dailyHistorical Med      polyethylene glycol (GLYCOLAX) packet Take 17 g by mouth daily as needed for ConstipationHistorical Med      acetaminophen (TYLENOL) 325 MG tablet Take 2 tablets by mouth every 6 hours as needed for Pain, Disp-120 tablet, R-0Print      psyllium (METAMUCIL SMOOTH TEXTURE) 28 % packet Take one dose daily, Disp-1 packet, R-5NO PRINT      Probiotic Product (ALIGN PO) Take 1 tablet by mouth daily Historical Med      Calcium-Vitamin D (OSCAL 500/200 D-3 PO) Take 2 Tabs by mouth daily.       latanoprost (XALATAN) 0.005 % SOLN Place 1 drop into both eyes nightly Historical Med

## 2019-10-23 PROBLEM — R30.0 DYSURIA: Status: ACTIVE | Noted: 2019-10-23

## 2019-11-22 ENCOUNTER — OFFICE VISIT (OUTPATIENT)
Dept: ORTHOPEDIC SURGERY | Age: 84
End: 2019-11-22
Payer: MEDICARE

## 2019-11-22 VITALS
HEIGHT: 60 IN | BODY MASS INDEX: 28.82 KG/M2 | TEMPERATURE: 97.7 F | DIASTOLIC BLOOD PRESSURE: 77 MMHG | HEART RATE: 88 BPM | WEIGHT: 146.8 LBS | SYSTOLIC BLOOD PRESSURE: 155 MMHG

## 2019-11-22 DIAGNOSIS — M17.12 PRIMARY OSTEOARTHRITIS OF LEFT KNEE: Primary | ICD-10-CM

## 2019-11-22 PROCEDURE — G8417 CALC BMI ABV UP PARAM F/U: HCPCS | Performed by: PHYSICIAN ASSISTANT

## 2019-11-22 PROCEDURE — G8482 FLU IMMUNIZE ORDER/ADMIN: HCPCS | Performed by: PHYSICIAN ASSISTANT

## 2019-11-22 PROCEDURE — 4040F PNEUMOC VAC/ADMIN/RCVD: CPT | Performed by: PHYSICIAN ASSISTANT

## 2019-11-22 PROCEDURE — 1123F ACP DISCUSS/DSCN MKR DOCD: CPT | Performed by: PHYSICIAN ASSISTANT

## 2019-11-22 PROCEDURE — G8427 DOCREV CUR MEDS BY ELIG CLIN: HCPCS | Performed by: PHYSICIAN ASSISTANT

## 2019-11-22 PROCEDURE — 1090F PRES/ABSN URINE INCON ASSESS: CPT | Performed by: PHYSICIAN ASSISTANT

## 2019-11-22 PROCEDURE — 20610 DRAIN/INJ JOINT/BURSA W/O US: CPT | Performed by: PHYSICIAN ASSISTANT

## 2019-11-22 PROCEDURE — 1036F TOBACCO NON-USER: CPT | Performed by: PHYSICIAN ASSISTANT

## 2019-11-22 PROCEDURE — 99213 OFFICE O/P EST LOW 20 MIN: CPT | Performed by: PHYSICIAN ASSISTANT

## 2019-11-22 RX ORDER — AMPICILLIN TRIHYDRATE 500 MG
450 CAPSULE ORAL DAILY
COMMUNITY
End: 2022-06-07 | Stop reason: SDUPTHER

## 2020-01-06 ENCOUNTER — HOSPITAL ENCOUNTER (OUTPATIENT)
Age: 85
Setting detail: OBSERVATION
Discharge: HOME HEALTH | End: 2020-01-07
Attending: EMERGENCY MEDICINE | Admitting: INTERNAL MEDICINE
Payer: MEDICARE

## 2020-01-06 ENCOUNTER — APPOINTMENT (OUTPATIENT)
Dept: CT IMAGING | Age: 85
End: 2020-01-06
Payer: MEDICARE

## 2020-01-06 ENCOUNTER — APPOINTMENT (OUTPATIENT)
Dept: GENERAL RADIOLOGY | Age: 85
End: 2020-01-06
Payer: MEDICARE

## 2020-01-06 PROBLEM — R42 DIZZINESS: Status: ACTIVE | Noted: 2020-01-06

## 2020-01-06 PROBLEM — K64.9 HEMORRHOID: Status: RESOLVED | Noted: 2017-02-03 | Resolved: 2020-01-06

## 2020-01-06 LAB
ANION GAP SERPL CALCULATED.3IONS-SCNC: 14 MMOL/L (ref 3–16)
BASOPHILS ABSOLUTE: 0 K/UL (ref 0–0.2)
BASOPHILS RELATIVE PERCENT: 0.7 %
BILIRUBIN URINE: NEGATIVE
BLOOD, URINE: NEGATIVE
BUN BLDV-MCNC: 18 MG/DL (ref 7–20)
CALCIUM SERPL-MCNC: 9.8 MG/DL (ref 8.3–10.6)
CHLORIDE BLD-SCNC: 103 MMOL/L (ref 99–110)
CLARITY: CLEAR
CO2: 26 MMOL/L (ref 21–32)
COLOR: YELLOW
CREAT SERPL-MCNC: 0.8 MG/DL (ref 0.6–1.2)
EKG ATRIAL RATE: 77 BPM
EKG DIAGNOSIS: NORMAL
EKG P AXIS: 5 DEGREES
EKG P-R INTERVAL: 164 MS
EKG Q-T INTERVAL: 352 MS
EKG QRS DURATION: 68 MS
EKG QTC CALCULATION (BAZETT): 398 MS
EKG R AXIS: -4 DEGREES
EKG T AXIS: 0 DEGREES
EKG VENTRICULAR RATE: 77 BPM
EOSINOPHILS ABSOLUTE: 0 K/UL (ref 0–0.6)
EOSINOPHILS RELATIVE PERCENT: 1 %
GFR AFRICAN AMERICAN: >60
GFR NON-AFRICAN AMERICAN: >60
GLUCOSE BLD-MCNC: 110 MG/DL (ref 70–99)
GLUCOSE URINE: NEGATIVE MG/DL
HCT VFR BLD CALC: 42.5 % (ref 36–48)
HEMOGLOBIN: 14.2 G/DL (ref 12–16)
KETONES, URINE: NEGATIVE MG/DL
LEUKOCYTE ESTERASE, URINE: NEGATIVE
LYMPHOCYTES ABSOLUTE: 0.9 K/UL (ref 1–5.1)
LYMPHOCYTES RELATIVE PERCENT: 18.7 %
MCH RBC QN AUTO: 31.3 PG (ref 26–34)
MCHC RBC AUTO-ENTMCNC: 33.4 G/DL (ref 31–36)
MCV RBC AUTO: 93.7 FL (ref 80–100)
MICROSCOPIC EXAMINATION: NORMAL
MONOCYTES ABSOLUTE: 0.4 K/UL (ref 0–1.3)
MONOCYTES RELATIVE PERCENT: 9.7 %
NEUTROPHILS ABSOLUTE: 3.2 K/UL (ref 1.7–7.7)
NEUTROPHILS RELATIVE PERCENT: 69.9 %
NITRITE, URINE: NEGATIVE
PDW BLD-RTO: 12.8 % (ref 12.4–15.4)
PH UA: 7 (ref 5–8)
PLATELET # BLD: 233 K/UL (ref 135–450)
PMV BLD AUTO: 8.3 FL (ref 5–10.5)
POTASSIUM REFLEX MAGNESIUM: 4.2 MMOL/L (ref 3.5–5.1)
PROTEIN UA: NEGATIVE MG/DL
RBC # BLD: 4.54 M/UL (ref 4–5.2)
SODIUM BLD-SCNC: 143 MMOL/L (ref 136–145)
SPECIFIC GRAVITY UA: <1.005 (ref 1–1.03)
TROPONIN: <0.01 NG/ML
URINE REFLEX TO CULTURE: NORMAL
URINE TYPE: NORMAL
UROBILINOGEN, URINE: 0.2 E.U./DL
WBC # BLD: 4.6 K/UL (ref 4–11)

## 2020-01-06 PROCEDURE — 80048 BASIC METABOLIC PNL TOTAL CA: CPT

## 2020-01-06 PROCEDURE — 93010 ELECTROCARDIOGRAM REPORT: CPT | Performed by: INTERNAL MEDICINE

## 2020-01-06 PROCEDURE — 85025 COMPLETE CBC W/AUTO DIFF WBC: CPT

## 2020-01-06 PROCEDURE — G0378 HOSPITAL OBSERVATION PER HR: HCPCS

## 2020-01-06 PROCEDURE — 93005 ELECTROCARDIOGRAM TRACING: CPT | Performed by: EMERGENCY MEDICINE

## 2020-01-06 PROCEDURE — 99285 EMERGENCY DEPT VISIT HI MDM: CPT

## 2020-01-06 PROCEDURE — 84484 ASSAY OF TROPONIN QUANT: CPT

## 2020-01-06 PROCEDURE — 99223 1ST HOSP IP/OBS HIGH 75: CPT | Performed by: INTERNAL MEDICINE

## 2020-01-06 PROCEDURE — 70450 CT HEAD/BRAIN W/O DYE: CPT

## 2020-01-06 PROCEDURE — 6370000000 HC RX 637 (ALT 250 FOR IP): Performed by: INTERNAL MEDICINE

## 2020-01-06 PROCEDURE — 2580000003 HC RX 258: Performed by: PHYSICIAN ASSISTANT

## 2020-01-06 PROCEDURE — 81003 URINALYSIS AUTO W/O SCOPE: CPT

## 2020-01-06 PROCEDURE — 36415 COLL VENOUS BLD VENIPUNCTURE: CPT

## 2020-01-06 PROCEDURE — 71046 X-RAY EXAM CHEST 2 VIEWS: CPT

## 2020-01-06 RX ORDER — METOPROLOL SUCCINATE 25 MG/1
25 TABLET, EXTENDED RELEASE ORAL DAILY
Status: DISCONTINUED | OUTPATIENT
Start: 2020-01-06 | End: 2020-01-07 | Stop reason: HOSPADM

## 2020-01-06 RX ORDER — LORAZEPAM 0.5 MG/1
0.5 TABLET ORAL 3 TIMES DAILY PRN
Status: DISCONTINUED | OUTPATIENT
Start: 2020-01-06 | End: 2020-01-07 | Stop reason: HOSPADM

## 2020-01-06 RX ORDER — ASPIRIN 81 MG/1
81 TABLET, CHEWABLE ORAL DAILY
Status: DISCONTINUED | OUTPATIENT
Start: 2020-01-07 | End: 2020-01-06 | Stop reason: SDUPTHER

## 2020-01-06 RX ORDER — ASPIRIN 81 MG/1
81 TABLET, CHEWABLE ORAL DAILY
Status: DISCONTINUED | OUTPATIENT
Start: 2020-01-06 | End: 2020-01-07 | Stop reason: HOSPADM

## 2020-01-06 RX ORDER — POTASSIUM CHLORIDE 750 MG/1
10 TABLET, FILM COATED, EXTENDED RELEASE ORAL DAILY
Status: DISCONTINUED | OUTPATIENT
Start: 2020-01-06 | End: 2020-01-07 | Stop reason: HOSPADM

## 2020-01-06 RX ORDER — PANTOPRAZOLE SODIUM 40 MG/1
40 TABLET, DELAYED RELEASE ORAL
Status: DISCONTINUED | OUTPATIENT
Start: 2020-01-06 | End: 2020-01-07 | Stop reason: HOSPADM

## 2020-01-06 RX ORDER — FUROSEMIDE 40 MG/1
20 TABLET ORAL DAILY
Status: DISCONTINUED | OUTPATIENT
Start: 2020-01-06 | End: 2020-01-07 | Stop reason: HOSPADM

## 2020-01-06 RX ORDER — ONDANSETRON 2 MG/ML
4 INJECTION INTRAMUSCULAR; INTRAVENOUS EVERY 6 HOURS PRN
Status: DISCONTINUED | OUTPATIENT
Start: 2020-01-06 | End: 2020-01-07 | Stop reason: HOSPADM

## 2020-01-06 RX ORDER — POLYETHYLENE GLYCOL 3350 17 G/17G
17 POWDER, FOR SOLUTION ORAL DAILY PRN
Status: DISCONTINUED | OUTPATIENT
Start: 2020-01-06 | End: 2020-01-07 | Stop reason: HOSPADM

## 2020-01-06 RX ORDER — 0.9 % SODIUM CHLORIDE 0.9 %
1000 INTRAVENOUS SOLUTION INTRAVENOUS ONCE
Status: COMPLETED | OUTPATIENT
Start: 2020-01-06 | End: 2020-01-06

## 2020-01-06 RX ORDER — SODIUM CHLORIDE 0.9 % (FLUSH) 0.9 %
10 SYRINGE (ML) INJECTION EVERY 12 HOURS SCHEDULED
Status: DISCONTINUED | OUTPATIENT
Start: 2020-01-06 | End: 2020-01-07 | Stop reason: HOSPADM

## 2020-01-06 RX ORDER — SODIUM CHLORIDE 0.9 % (FLUSH) 0.9 %
10 SYRINGE (ML) INJECTION PRN
Status: DISCONTINUED | OUTPATIENT
Start: 2020-01-06 | End: 2020-01-07 | Stop reason: HOSPADM

## 2020-01-06 RX ORDER — LATANOPROST 50 UG/ML
1 SOLUTION/ DROPS OPHTHALMIC NIGHTLY
Status: DISCONTINUED | OUTPATIENT
Start: 2020-01-06 | End: 2020-01-07 | Stop reason: HOSPADM

## 2020-01-06 RX ORDER — I-VITE, TAB 1000-60-2MG (60/BT) 300MCG-200
2 TAB ORAL DAILY
Status: DISCONTINUED | OUTPATIENT
Start: 2020-01-07 | End: 2020-01-07 | Stop reason: HOSPADM

## 2020-01-06 RX ORDER — ACETAMINOPHEN 500 MG
500 TABLET ORAL EVERY 6 HOURS PRN
Status: DISCONTINUED | OUTPATIENT
Start: 2020-01-06 | End: 2020-01-07 | Stop reason: HOSPADM

## 2020-01-06 RX ADMIN — POTASSIUM CHLORIDE 10 MEQ: 750 TABLET, FILM COATED, EXTENDED RELEASE ORAL at 20:23

## 2020-01-06 RX ADMIN — PANTOPRAZOLE SODIUM 40 MG: 40 TABLET, DELAYED RELEASE ORAL at 20:22

## 2020-01-06 RX ADMIN — LATANOPROST 1 DROP: 50 SOLUTION OPHTHALMIC at 20:26

## 2020-01-06 RX ADMIN — ASPIRIN 81 MG 81 MG: 81 TABLET ORAL at 20:22

## 2020-01-06 RX ADMIN — LORAZEPAM 0.5 MG: 0.5 TABLET ORAL at 17:28

## 2020-01-06 RX ADMIN — METOPROLOL SUCCINATE 25 MG: 25 TABLET, EXTENDED RELEASE ORAL at 20:22

## 2020-01-06 RX ADMIN — SODIUM CHLORIDE 1000 ML: 9 INJECTION, SOLUTION INTRAVENOUS at 13:09

## 2020-01-06 ASSESSMENT — PAIN SCALES - GENERAL
PAINLEVEL_OUTOF10: 0

## 2020-01-06 NOTE — ED NOTES
Patient daughter came to desk and reported that her mother's call light has been on and no one has been in her room. Sarthak Augustin RN gave patient menu. Patient reports, \"I haven't ate at all today. \" Patient asked if she had eaten the Belgian [place] [facility] sandwich that was given to her by Mima Tesfaye and patient said, \"Oh yeah I did. \" Patient reported that she was upset with her care and that this RN did not come in when she needed. Apologized to patient and daughter and explained that RN had to complete care for another patient at that time. Patient reports, \"I'm just frustrated with everything. I feel bad and I can't explain what's wrong. \" Will continue to monitor patient closely. Call light within reach.       Domonique Hugo RN  01/06/20 [id number]

## 2020-01-06 NOTE — ED NOTES
ED MD updated patient that she will be going home if repeat trop is negative. Patient upset because she wants to be admitted. This RN attempted to speak with patient about concern, patient remains frustrated. Additional call being placed to PCP by ED MD to update on patient request for admission.       Denise Eugene RN  01/06/20 4263

## 2020-01-06 NOTE — ED NOTES
Patient denies pain at this time. Patient used bedside commode and assisted back to bed by this RN. Call light within reach. Will continue to monitor.       Domonique Hugo RN  01/06/20 0312

## 2020-01-06 NOTE — H&P
H&P dictted --IMP: Principal Problem:    Chest pain--rt sided sharp--non exertion--neg troponin --  Active Problems:    Dizziness---with position change--chr issue --no focal deficits     Essential hypertension--fluctunt---will follow --add toprol-xl 25--start tonite     Abnormal glucose--Continue current therapy    Resolved Problems:    Admit as OBS--serial troponin--ck echo--pt/ot for ROM --does not sound cardiac--dw patient and kevon at bedside     Amaury Fernandez

## 2020-01-06 NOTE — ED NOTES
Patient re-updated on plan of care. Patient verbalized understanding. Call light within reach. Will continue to monitor.       Patrick Dooley RN  01/06/20 5728

## 2020-01-06 NOTE — ED PROVIDER NOTES
Normal range of motion. Skin:     General: Skin is warm and dry. Neurological:      Mental Status: She is alert and oriented to person, place, and time. GCS: GCS eye subscore is 4. GCS verbal subscore is 5. GCS motor subscore is 6. Cranial Nerves: Cranial nerves are intact. Sensory: Sensation is intact. Motor: Motor function is intact. Coordination: Coordination normal. Finger-Nose-Finger Test normal.   Psychiatric:         Behavior: Behavior normal.         EKG visualized preliminarily interpreted by myself shows sinus rhythm. The rate is 77 the axis is -4. Lead aVF is isoelectric. There may be an old inferior wall infarct but I do suspect there are small R waves present in the inferior leads. Specifically 3 and F.  Otherwise intervals are normal and I do not appreciate acute injury or acute ischemia. 1. Chest pain, unspecified type    2.  Dizziness          DISPOSITION/PLAN  PATIENT REFERRED TO:  Ana Sanchez MD  [address]  [phone]          DISCHARGE MEDICATIONS:  New Prescriptions    No medications on file         MD Mayte Marcum MD  01/06/20 [address] Kole Mendez MD  01/06/20 3560

## 2020-01-06 NOTE — ED NOTES
Obtained lab specimens via venipuncture. Collected and sent to lab. Patient tolerated procedure well. Patient denies needs at this time. Call light within reach.        Jimi Bahena RN  01/06/20 0254

## 2020-01-06 NOTE — ED PROVIDER NOTES
[address]       Pt Name: Lion De Los Santos  MRN: [id number]  [place] 3/11/1929  Date of evaluation: 1/6/2020  Provider: BLAIR Jarvis    This patient was seen and evaluated by the attending physician Colonel Mcburney, MD.    [address] [place] [address]       Chief Complaint   Patient presents with    Chest Pain     intermittent chest pain since \"middle of night\" pt reports hypertension at home. HISTORY OF PRESENT ILLNESS  (Location/Symptom, Timing/Onset, Context/Setting, Quality, Duration, Modifying Factors, Severity.)   Lion De Los Santos is a 80 y.o. female who presents to the emergency department with complaints of high blood pressure reading and some intermittent right-sided chest pain. Patient says that she was awakened sometime last night by some pain in her chest that went away, and then this morning she had 2 or 3 episodes of sharp, fleeting right-sided chest pain. She denies any sensation of chest pressure or significant shortness of breath. Denies any cough or cold symptoms. She also reports that she had a high blood pressure reading this morning at home but does not recall exactly what it was. Says she does not take any blood pressure lowering medications. Patient denies any recent illnesses and says she has been feeling essentially normal recently. Reports eating normally, no urinary or bowel complaints. Patient's sister at bedside says the patient appears slightly more confused than usual but does have periodic episodes of confusion. No other complaints. Nursing Notes were reviewed and I agree. REVIEW OF SYSTEMS    (2-9 systems for level 4, 10 or more for level 5)     Constitutional:  Negative for fever, chills, appetite change, fatigue and unexpected weight change. HENT:  Negative for congestion, ear pain, facial swelling, rhinorrhea, sinus pressure, sneezing, sore throat and trouble swallowing.     Eyes:  Negative for photophobia, pain and visual disturbance. Respiratory:  Negative for cough, shortness of breath, wheezing and stridor. Cardiovascular: Positive for episodic right-sided chest pain. Negative for palpitations and leg swelling. Gastrointestinal:  Negative for nausea, vomiting, abdominal pain, diarrhea, constipation and blood in stool. Genitourinary:  Negative for dysuria, urgency, hematuria, flank pain, vaginal bleeding, vaginal discharge and pelvic pain. Musculoskeletal:  Negative for myalgias, arthralgias, neck pain and neck stiffness. Neurological:  Negative for dizziness, seizures, syncope, speech difficulty, weakness, light-headedness, numbness and headaches. Psychiatric/Behavioral: Positive for mild episodic confusion. Negative for suicidal ideas, hallucinations, sleep disturbance and agitation. Except as noted above the remainder of the review of systems was reviewed and negative. PAST MEDICAL HISTORY         Diagnosis Date    Arthritis     Blood circulation, collateral     Cataract     Clostridium difficile infection 1/28/14    GERD (gastroesophageal reflux disease)     Glaucoma     Hyperlipidemia     no meds    Hypertension 2017    essential=pt denies    Macular degeneration     UTI (urinary tract infection)        SURGICAL HISTORY           Procedure Laterality Date    CATARACT REMOVAL  2008    COLONOSCOPY      12/17/2009    HERNIA REPAIR Left 06/08/2018    repair of ventral hernia with mesh    HYSTERECTOMY  1980    JOINT REPLACEMENT Right     knee    TOTAL KNEE ARTHROPLASTY      rt 6/9/2009   dr Gelacio Bonilla   [address] [facility], 1984 left.     x3       CURRENT MEDICATIONS       Current Discharge Medication List      CONTINUE these medications which have NOT CHANGED    Details   LORazepam (ATIVAN) 0.5 MG tablet TAKE ONE TABLET BY MOUTH THREE TIMES A DAY AS NEEDED FOR ANXIETY  Qty: 90 tablet, Refills: 2    Associated Diagnoses: Anxiety      Cranberry 450 MG CAPS Take by mouth      potassium chloride (KLOR-CON M10) 10 MEQ extended release tablet TAKE ONE TABLET BY MOUTH DAILY  Qty: 90 tablet, Refills: 3      furosemide (LASIX) 20 MG tablet TAKE ONE TABLET BY MOUTH EVERY MORNING  Qty: 90 tablet, Refills: 1      pantoprazole (PROTONIX) 40 MG tablet TAKE ONE TABLET BY MOUTH EVERY MORNING  Qty: 90 tablet, Refills: 3      aspirin 81 MG tablet Take 81 mg by mouth daily      Multiple Vitamins-Minerals (PRESERVISION AREDS 2) CAPS Take 2 capsules by mouth daily      polyethylene glycol (GLYCOLAX) packet Take 17 g by mouth daily as needed for Constipation      acetaminophen (TYLENOL) 325 MG tablet Take 2 tablets by mouth every 6 hours as needed for Pain  Qty: 120 tablet, Refills: 0      Probiotic Product (ALIGN PO) Take 1 tablet by mouth daily       Calcium-Vitamin D (OSCAL 500/200 D-3 PO) Take 2 Tabs by mouth daily. latanoprost (XALATAN) 0.005 % SOLN Place 1 drop into both eyes nightly              ALLERGIES     Ciprofloxacin; Oxycodone-acetaminophen; and Percocet [oxycodone-acetaminophen]    FAMILY HISTORY           Problem Relation Age of Onset   Bedelia Fruits Cancer Sister         pancreatic     Family Status   Relation Name Status    Mother      Father   at age 79    Sister  (Not Specified)        [facility]      reports that she has never smoked. She has never used smokeless tobacco. She reports that she does not drink alcohol or use drugs. PHYSICAL EXAM    (up to 7 for level 4, 8 or more for level 5)     ED Triage Vitals [20 1159]   BP Temp Temp Source Pulse Resp SpO2 Height Weight   (!) 189/91 97 °F (36.1 °C) Oral 86 16 97 % 5' (1.524 m) 143 lb 11.8 oz (65.2 kg)       Constitutional:  Appearing well-developed and well-nourished. No distress. HENT:  Normocephalic and atraumatic. Conjunctivae and EOM are normal. Pupils are equal, round, and reactive to light. Neck:  Normal range of motion. Neck supple. No tracheal deviation present. No thyromegaly present. [facility]  [address], [place] [address]   Phone [phone]   TROPONIN    Narrative:     Collection has been rescheduled by [employer] at 01/06/2020 15:07 Reason: RN   viviana bld  Performed at:  [facility]  [address], [place] [address]   Phone [phone]   TROPONIN    Narrative:     Performed at:  [facility]  [address], [place] [address]   Phone [phone]   TROPONIN    Narrative:     Performed at:  [facility]  [address], [place] [address]   Phone [phone]   TROPONIN    Narrative:     Performed at:  [facility]  [address] [facility] [address] [place] [address]   Phone [phone]   LIPID PANEL   CBC WITH AUTO DIFFERENTIAL   COMPREHENSIVE METABOLIC PANEL       All other labs were within normal range or not returned as of this dictation. EMERGENCY DEPARTMENT COURSE and DIFFERENTIAL DIAGNOSIS/MDM:   Vitals:    Vitals:    01/06/20 2257 01/07/20 0034 01/07/20 0431 01/07/20 0434   BP:  (!) 153/74 (!) 161/78    Pulse:  62 68    Resp:  20 20    Temp:  98 °F (36.7 °C) 98 °F (36.7 °C)    TempSrc:  Oral Oral    SpO2:  95% 94%    Weight: 143 lb 4.8 oz (65 kg)   140 lb 3.4 oz (63.6 kg)   Height: 5' (1.524 m)          The patient's condition in the ED was good, the patient was afebrile and nontoxic in appearance, and the patient's physical exam was unremarkable. No chest pain reported while in the ED. EKG showed normal sinus rhythm, no concerning findings, as interpreted by ED attending Dr. Mariely Terrazas. Lab work-up showed no notable abnormalities, including negative troponin, negative for UTI. Chest x-ray and CT scan of the head without contrast both showed no acute findings.   Dr. Mariely Terrazas consulted the patient's primary care physician, Dr. Felicitas Santana, who recommended that the patient would be safe for discharge

## 2020-01-07 VITALS
BODY MASS INDEX: 27.53 KG/M2 | HEART RATE: 64 BPM | OXYGEN SATURATION: 94 % | DIASTOLIC BLOOD PRESSURE: 68 MMHG | TEMPERATURE: 97.9 F | SYSTOLIC BLOOD PRESSURE: 109 MMHG | WEIGHT: 140.21 LBS | RESPIRATION RATE: 18 BRPM | HEIGHT: 60 IN

## 2020-01-07 LAB
A/G RATIO: 1.7 (ref 1.1–2.2)
ALBUMIN SERPL-MCNC: 4 G/DL (ref 3.4–5)
ALP BLD-CCNC: 52 U/L (ref 40–129)
ALT SERPL-CCNC: 9 U/L (ref 10–40)
ANION GAP SERPL CALCULATED.3IONS-SCNC: 15 MMOL/L (ref 3–16)
AST SERPL-CCNC: 18 U/L (ref 15–37)
BASOPHILS ABSOLUTE: 0 K/UL (ref 0–0.2)
BASOPHILS RELATIVE PERCENT: 0.6 %
BILIRUB SERPL-MCNC: 0.7 MG/DL (ref 0–1)
BUN BLDV-MCNC: 16 MG/DL (ref 7–20)
CALCIUM SERPL-MCNC: 9.2 MG/DL (ref 8.3–10.6)
CHLORIDE BLD-SCNC: 108 MMOL/L (ref 99–110)
CHOLESTEROL, TOTAL: 201 MG/DL (ref 0–199)
CO2: 21 MMOL/L (ref 21–32)
CREAT SERPL-MCNC: 0.6 MG/DL (ref 0.6–1.2)
EKG ATRIAL RATE: 60 BPM
EKG DIAGNOSIS: NORMAL
EKG P AXIS: 39 DEGREES
EKG P-R INTERVAL: 176 MS
EKG Q-T INTERVAL: 410 MS
EKG QRS DURATION: 78 MS
EKG QTC CALCULATION (BAZETT): 410 MS
EKG R AXIS: -3 DEGREES
EKG T AXIS: 9 DEGREES
EKG VENTRICULAR RATE: 60 BPM
EOSINOPHILS ABSOLUTE: 0.1 K/UL (ref 0–0.6)
EOSINOPHILS RELATIVE PERCENT: 1.9 %
GFR AFRICAN AMERICAN: >60
GFR NON-AFRICAN AMERICAN: >60
GLOBULIN: 2.3 G/DL
GLUCOSE BLD-MCNC: 94 MG/DL (ref 70–99)
HCT VFR BLD CALC: 39 % (ref 36–48)
HDLC SERPL-MCNC: 58 MG/DL (ref 40–60)
HEMOGLOBIN: 13.4 G/DL (ref 12–16)
LDL CHOLESTEROL CALCULATED: 116 MG/DL
LV EF: 58 %
LVEF MODALITY: NORMAL
LYMPHOCYTES ABSOLUTE: 1 K/UL (ref 1–5.1)
LYMPHOCYTES RELATIVE PERCENT: 16.9 %
MCH RBC QN AUTO: 32 PG (ref 26–34)
MCHC RBC AUTO-ENTMCNC: 34.3 G/DL (ref 31–36)
MCV RBC AUTO: 93.3 FL (ref 80–100)
MONOCYTES ABSOLUTE: 0.7 K/UL (ref 0–1.3)
MONOCYTES RELATIVE PERCENT: 11 %
NEUTROPHILS ABSOLUTE: 4.2 K/UL (ref 1.7–7.7)
NEUTROPHILS RELATIVE PERCENT: 69.6 %
PDW BLD-RTO: 13.1 % (ref 12.4–15.4)
PLATELET # BLD: 212 K/UL (ref 135–450)
PMV BLD AUTO: 8.5 FL (ref 5–10.5)
POTASSIUM SERPL-SCNC: 3.9 MMOL/L (ref 3.5–5.1)
RBC # BLD: 4.18 M/UL (ref 4–5.2)
SODIUM BLD-SCNC: 144 MMOL/L (ref 136–145)
TOTAL PROTEIN: 6.3 G/DL (ref 6.4–8.2)
TRIGL SERPL-MCNC: 137 MG/DL (ref 0–150)
TROPONIN: <0.01 NG/ML
VLDLC SERPL CALC-MCNC: 27 MG/DL
WBC # BLD: 6 K/UL (ref 4–11)

## 2020-01-07 PROCEDURE — 6370000000 HC RX 637 (ALT 250 FOR IP): Performed by: INTERNAL MEDICINE

## 2020-01-07 PROCEDURE — 36415 COLL VENOUS BLD VENIPUNCTURE: CPT

## 2020-01-07 PROCEDURE — 99217 PR OBSERVATION CARE DISCHARGE MANAGEMENT: CPT | Performed by: INTERNAL MEDICINE

## 2020-01-07 PROCEDURE — 80053 COMPREHEN METABOLIC PANEL: CPT

## 2020-01-07 PROCEDURE — 97530 THERAPEUTIC ACTIVITIES: CPT

## 2020-01-07 PROCEDURE — 93306 TTE W/DOPPLER COMPLETE: CPT

## 2020-01-07 PROCEDURE — G0378 HOSPITAL OBSERVATION PER HR: HCPCS

## 2020-01-07 PROCEDURE — 2580000003 HC RX 258: Performed by: INTERNAL MEDICINE

## 2020-01-07 PROCEDURE — 94760 N-INVAS EAR/PLS OXIMETRY 1: CPT

## 2020-01-07 PROCEDURE — 93010 ELECTROCARDIOGRAM REPORT: CPT | Performed by: INTERNAL MEDICINE

## 2020-01-07 PROCEDURE — 80061 LIPID PANEL: CPT

## 2020-01-07 PROCEDURE — 97161 PT EVAL LOW COMPLEX 20 MIN: CPT

## 2020-01-07 PROCEDURE — 93005 ELECTROCARDIOGRAM TRACING: CPT | Performed by: INTERNAL MEDICINE

## 2020-01-07 PROCEDURE — 85025 COMPLETE CBC W/AUTO DIFF WBC: CPT

## 2020-01-07 RX ORDER — METOPROLOL SUCCINATE 25 MG/1
25 TABLET, EXTENDED RELEASE ORAL DAILY
Qty: 30 TABLET | Refills: 3 | Status: SHIPPED | OUTPATIENT
Start: 2020-01-08 | End: 2020-05-26

## 2020-01-07 RX ADMIN — FUROSEMIDE 20 MG: 40 TABLET ORAL at 08:44

## 2020-01-07 RX ADMIN — PANTOPRAZOLE SODIUM 40 MG: 40 TABLET, DELAYED RELEASE ORAL at 05:08

## 2020-01-07 RX ADMIN — METOPROLOL SUCCINATE 25 MG: 25 TABLET, EXTENDED RELEASE ORAL at 08:45

## 2020-01-07 RX ADMIN — LORAZEPAM 0.5 MG: 0.5 TABLET ORAL at 14:29

## 2020-01-07 RX ADMIN — ASPIRIN 81 MG 81 MG: 81 TABLET ORAL at 08:45

## 2020-01-07 RX ADMIN — POTASSIUM CHLORIDE 10 MEQ: 750 TABLET, FILM COATED, EXTENDED RELEASE ORAL at 08:45

## 2020-01-07 RX ADMIN — Medication 10 ML: at 08:46

## 2020-01-07 RX ADMIN — I-VITE, TAB 1000-60-2MG (60/BT) 2 TABLET: TAB at 08:45

## 2020-01-07 RX ADMIN — LORAZEPAM 0.5 MG: 0.5 TABLET ORAL at 10:10

## 2020-01-07 ASSESSMENT — PAIN DESCRIPTION - PROGRESSION
CLINICAL_PROGRESSION: NOT CHANGED

## 2020-01-07 ASSESSMENT — PAIN - FUNCTIONAL ASSESSMENT: PAIN_FUNCTIONAL_ASSESSMENT: ACTIVITIES ARE NOT PREVENTED

## 2020-01-07 ASSESSMENT — PAIN SCALES - GENERAL
PAINLEVEL_OUTOF10: 5
PAINLEVEL_OUTOF10: 0

## 2020-01-07 ASSESSMENT — PAIN DESCRIPTION - ONSET: ONSET: ON-GOING

## 2020-01-07 ASSESSMENT — PAIN DESCRIPTION - PAIN TYPE: TYPE: CHRONIC PAIN

## 2020-01-07 ASSESSMENT — PAIN DESCRIPTION - DESCRIPTORS: DESCRIPTORS: ACHING

## 2020-01-07 ASSESSMENT — PAIN DESCRIPTION - ORIENTATION: ORIENTATION: RIGHT

## 2020-01-07 ASSESSMENT — PAIN DESCRIPTION - LOCATION: LOCATION: LEG

## 2020-01-07 ASSESSMENT — PAIN DESCRIPTION - FREQUENCY: FREQUENCY: CONTINUOUS

## 2020-01-07 NOTE — DISCHARGE INSTR - COC
Continuity of Care Form    Patient Name: Matthias Martinez   :  3/11/1929  MRN:  [id number]    Admit date:  2020  Discharge date:  2020    Code Status Order: Full Code   Advance Directives:   Advance Care Flowsheet Documentation     Date/Time Healthcare Directive Type of Healthcare Directive Copy in [address] Agent's Name Healthcare Agent's Phone Number    [phone]  Yes, patient has an advance directive for healthcare treatment  Durable power of  for health care;Living will  No, copy requested from family  Healthcare power of   Ja Campos  [phone]          Admitting Physician:  Triston Ortiz MD  PCP: Veronika Nguyen MD    Discharging Nurse: [facility]/Room#: X6O-8666/0798-45  Discharging Unit Phone Number: [phone]    Emergency Contact:   Extended Emergency Contact Information  Primary Emergency Contact:  [address] [place] [address] Phone: [phone]  Relation: Child    Past Surgical History:  Past Surgical History:   Procedure Laterality Date    CATARACT REMOVAL  2008    COLONOSCOPY      2009    HERNIA REPAIR Left 2018    repair of ventral hernia with mesh    HYSTERECTOMY  1980    JOINT REPLACEMENT Right     knee    TOTAL KNEE ARTHROPLASTY      rt 2009   dr Nava Sol   [facility], 1984 left.     x3       Immunization History:   Immunization History   Administered Date(s) Administered    Influenza 2011, 10/25/2013    Influenza Virus Vaccine 10/20/2014    Influenza Whole 10/19/2010    Influenza, High Dose (Fluzone 65 yrs and older) 2017, 2018    Influenza, Quadv, IM, PF (6 mo and older Fluzone, Flulaval, Fluarix, and 3 yrs and older Afluria) 2016    Influenza, Triv, inactivated, subunit, adjuvanted, IM (Fluad 65 yrs and older) 11/15/2019    Pneumococcal Conjugate 13-valent (Codwect64) 2016    Pneumococcal Elimination:  Continence:   · Bowel: Yes  · Bladder: Yes  Urinary Catheter: None   Colostomy/Ileostomy/Ileal Conduit: No       Date of Last BM: 1/6/2020    Intake/Output Summary (Last 24 hours) at 1/7/2020 1231  Last data filed at 1/7/2020 1010  Gross per 24 hour   Intake 1480 ml   Output 401 ml   Net 1079 ml     I/O last 3 completed shifts: In: 200 [P.O.:240; IV Piggyback:1000]  Out: 401 [Urine:401]    Safety Concerns: At Risk for Falls    Impairments/Disabilities:      None    Nutrition Therapy:  Current Nutrition Therapy:   - Oral Diet:  Cardiac and No caffeine    Routes of Feeding: Oral  Liquids: Thin Liquids  Daily Fluid Restriction: no  Last Modified Barium Swallow with Video (Video Swallowing Test): not done    Treatments at the Time of Hospital Discharge:   Respiratory Treatments: None  Oxygen Therapy:  is not on home oxygen therapy.   Ventilator:    - No ventilator support    Rehab Therapies: Physical Therapy and Occupational Therapy  Weight Bearing Status/Restrictions: No weight bearing restirctions  Other Medical Equipment (for information only, NOT a DME order):  None  Other Treatments: None    Patient's personal belongings (please select all that are sent with patient):  Naif    RN SIGNATURE:  Electronically signed by Marycruz Sen RN on 1/7/20 at 6:27 PM    CASE MANAGEMENT/SOCIAL WORK SECTION    Inpatient Status Date: ***    Readmission Risk Assessment Score:  Readmission Risk              Risk of Unplanned Readmission:        12           Discharging to Facility/ Agency   · Name:   · Address:  · Phone:  · Fax:    Dialysis Facility (if applicable)   · Name:  · Address:  · Dialysis Schedule:  · Phone:  · Fax:    / signature: {Esignature:[id number]}    PHYSICIAN SECTION    Prognosis: Good    Condition at Discharge: Stable    Rehab Potential (if transferring to Rehab): Good    Recommended Labs or Other Treatments After Discharge: pt/ot  [facility]     Physician Certification: I certify the above information and transfer of Crispin Evans  is necessary for the continuing treatment of the diagnosis listed and that she requires [address] for less 30 days.      Update Admission H&P: No change in H&P    PHYSICIAN SIGNATURE:  Electronically signed by Silviano Bee MD on 1/7/20 at 12:32 PM

## 2020-01-07 NOTE — H&P
[address] [facility]                              HISTORY AND PHYSICAL    PATIENT NAME: Bridgett Patterson                 :        1929  MED REC NO:   [id number]                          ROOM:       7289  ACCOUNT NO:   [de-identified]                           ADMIT DATE: 2020  PROVIDER:     Aleksandr Townsend MD      CHIEF COMPLAINT:  Chest pain. HISTORY OF PRESENT ILLNESS:  The patient is a 44-year-old white female  admitted through emergency. She presented with a history of  intermittent right-sided chest pain. She states she was awakened  sometime even prior to admission by a pain in the chest that was  described as a right-sided and sharp without associated symptoms. It  went away by itself and then recurred two or three episodes in the  morning of admission. She denies any sensation of chest pressure,  significant shortness of breath, nausea or vomiting, fever or chills. She does report high blood pressure at home this morning but does not  recall what that number was. She reports eating normally. No urinary  or bowel complaints. The daughter does state that early in the hospital  ER evaluation, she seemed a little confused but she seemed better now. Evaluation in the ER showed negative troponin x2, an EKG that was  unremarkable, a head CT and urinalysis that are unremarkable. The  patient was requesting admission because of her chest pain which she  thought needed to be further evaluated. PAST MEDICAL HISTORY:  Includes generalized arthritis, gastroesophageal  reflux disease, hypertension, hyperlipidemia, prior urinary tract  infection.     PAST SURGICAL HISTORY:  Includes cataract removal, hernia repair,  hysterectomy, and right knee arthroplasty in the past.    MEDICATIONS ON ADMISSION:  Include Tylenol, calcium, furosemide,  ibuprofen, pantoprazole, MiraLax, potassium, hospital course. 4.  Abnormal glucose. Continue current therapy. PLAN:  Admit as an observation status. We will do serial troponins.   We  will get echocardiogram in the morning and PT and OT to see her and get  her up and see if her symptoms persist.        Lillie Negron MD    D: 01/06/2020 18:42:59       T: 01/06/2020 20:53:22     [email or web address]  Job#: [id number]     Doc#: [id number]    CC:  Alec Naidu MD

## 2020-01-07 NOTE — PLAN OF CARE
Problem: Falls - Risk of:  Goal: Will remain free from falls  Description  Will remain free from falls  1/7/2020 1034 by Nika Rose RN  Outcome: Ongoing   Fall risk precautions in place. Bed in lowest position with wheels locked, Joanne bed alarm in place and activated, orange blanket on bed, non-skid socks on pt, fall risk ID on pt, call light in reach, pt encouraged to call before getting out of bed and for any other needs or complaints. Problem: Pain:  Goal: Pain level will decrease  Description  Pain level will decrease  1/7/2020 1034 by Nika Rose RN  Outcome: Ongoing   Pain/discomfort being managed with PRN analgesics per MD orders. Pt able to express presence and absence of pain and rate pain appropriately using numerical scale.

## 2020-01-07 NOTE — PROGRESS NOTES
Physical Therapy    Facility/Department: [address] PROGRESSIVE CARE  Initial Assessment and discharge    NAME: Matheus Reynolds  : 3/11/1929  MRN: [id number]    Date of Service: 2020    Discharge Recommendations:  Home with assist PRN, Home with Home health PT, S Level 1     Matheus Reynolds scored a 23/24 on the AM-PAC short mobility form. Current research shows that an AM-PAC score of 18 or greater is typically associated with a discharge to the patient's home setting. Based on the patients AM-PAC score and their current functional mobility deficits, it is recommended that the patient have 2-3 sessions per week of Physical Therapy at d/c to increase the patients independence. HOME HEALTH CARE: LEVEL 1 STANDARD     -Initial home health evaluation to occur within 24-48 hours, in patient home    -Home health agency to establish plan of care for patient over 60 day period    -Medication Reconciliation    -PCP Visit scheduled within seven days of discharge    -PT/OT to evaluate with goal of regaining prior level of functioning    -OT to evaluate if patient has [address] needs for personal care       Assessment   Assessment: Pt is a 80 y.o. female who presented to [facility] on 19 with chest pain, elevated BP, \"wooziness\" (can't differentiate between lightheadedness and vertigo). Patient functioning close to baseline. She would benefit from HHPT due to patients report of decreasing strength and functional mobility. Anticipate home with PRN assist from dtr and level 1 HHPT  Prognosis: Good  Decision Making: Low Complexity  Exam: see above  Clinical Presentation: stable  No Skilled PT: Safe to return home  REQUIRES PT FOLLOW UP: No  Activity Tolerance  Activity Tolerance: Patient Tolerated treatment well       Patient Diagnosis(es): The primary encounter diagnosis was Chest pain, unspecified type. A diagnosis of Dizziness was also pertinent to this visit.      has a past medical history of Arthritis, Blood circulation, collateral, Cataract, Clostridium difficile infection, GERD (gastroesophageal reflux disease), Glaucoma, Hyperlipidemia, Hypertension, Macular degeneration, and UTI (urinary tract infection). has a past surgical history that includes Cataract removal (2008); Total knee arthroplasty; Hysterectomy (1980); Varicose vein surgery (1967 leticia, 1984 left.); Colonoscopy; joint replacement (Right); and hernia repair (Left, 06/08/2018). Restrictions     Vision/Hearing  Vision: Impaired  Vision Exceptions: Wears glasses at all times  Hearing: Exceptions to [facility]  Hearing Exceptions: Hard of hearing/hearing concerns     Subjective  General  Chart Reviewed: Yes  Patient assessed for rehabilitation services?: Yes  Additional Pertinent Hx: Pt is a 80 y.o. female who presented to [facility] on [facility]/6[facility]19 with chest pain, elevated BP, \"wooziness\" (can't differentiate between lightheadedness and vertigo). Response To Previous Treatment: Not applicable  Family / Caregiver Present: No  Referring Practitioner: Dr. Zuri Miguel  Referral Date : 01/07/20  Diagnosis: Chest pain  Follows Commands: Within Functional Limits  Subjective  Subjective: The pt is agreeable to PT -  has not had any more chest pain.   Pain Screening  Patient Currently in Pain: Yes          Orientation  Orientation  Overall Orientation Status: Impaired  Orientation Level: Oriented to place;Oriented to person;Oriented to situation;Disoriented to time(Thought it was Wednesday)     Social/Functional History  Social/Functional History  Lives With: Alone  Type of Home: House  Home Layout: Two level, Laundry in basement, Able to Live on Main level with bedroom/bathroom, Bed/Bath upstairs  Home Access: Stairs to enter with rails  Entrance Stairs - Number of Steps: 2 - typically through garage  Bathroom Shower/Tub: Walk-in shower  Bathroom Toilet: (comfort height)  Bathroom Equipment: Built-in shower seat, Grab bars in [address]: [address] [employer]  ADL Assistance: Independent  Homemaking Assistance: Independent  Ambulation Assistance: Independent  Transfer Assistance: Independent  Active : No  Mode of Transportation: Family  Leisure & Hobbies: not much of anything - used to sew alot but limited vision    Objective  Strength RLE  Strength RLE: WFL  Strength LLE  Strength LLE: WFL  Motor Control  Gross Motor?: WNL     Bed mobility  Supine to Sit: Supervision  Transfers  Sit to Stand: Stand by assistance  Stand to sit: Stand by assistance  Ambulation  Ambulation?: Yes  Ambulation 1  Surface: level tile  Device: No Device  Assistance: Stand by assistance  Gait Deviations: Slow Daniela  Distance: 100'  Comments: mild deviated path due to \"painful knees\".   Stairs/Curb  Stairs?: No     Balance  Sitting - Static: Good  Sitting - Dynamic: Good  Standing - Static: Fair;+  Standing - Dynamic: Fair;+        Plan   Safety Devices  Type of devices: Call light within reach, Gait belt, Left in chair, Chair alarm in place      AM-PAC Score  AM-PAC Inpatient Mobility Raw Score : 23 (01/07/20 1511)  AM-PAC Inpatient T-Scale Score : 56.93 (01/07/20 1511)  Mobility Inpatient CMS 0-100% Score: 11.2 (01/07/20 1511)  Mobility Inpatient CMS G-Code Modifier : CI (01/07/20 1511)            Therapy Time   Individual Concurrent Group Co-treatment   Time In  1430         Time Out  1500         Minutes  30                 Babs Moncada, PT

## 2020-01-08 NOTE — DISCHARGE SUMMARY
[address] [address]                               DISCHARGE SUMMARY    PATIENT NAME: Phyllis Douglass                 :        1929  MED REC NO:   [id number]                          ROOM:       9606  ACCOUNT NO:   [de-identified]                           ADMIT DATE: 2020  PROVIDER:     Jose Roth MD                  DISCHARGE DATE:  2020        DIAGNOSIS ON ADMISSION:  Chest pain. DIAGNOSES ON DISCHARGE:  1. Chest pain, atypical.  2.  Dizziness. 3.  Hypertension. 4.  Abnormal glucose. HISTORY OF PRESENT ILLNESS:  The patient is a 51-year-old white female  admitted through observation through emergency. She presented with a  history of stating that her blood pressure was high when she checked it  at home and then she had some right-sided sharp chest pain that occurred  two to three times on the morning of admission. Denies shortness of  breath, nausea or vomiting, fever or chills with it. She came to the  emergency room and was noted to be slightly confused by the daughter but  in view of her chest pain despite negative troponins and overall  weakness along with some confusion spells _____ to observe her. She was  observed for a period of 24 to 36 hours. Serial troponins were  unremarkable. An echocardiogram was performed and it did not show any  abnormalities. She had normal left ventricular function. She had a  little bit of asymmetric septal hypertrophy of the septum but no outflow  obstruction. Ejection fraction was 55% to 60%. She was seen by PT and  OT and up and ambulating and she was feeling stronger. Laboratory data  was unremarkable. Urinalysis was unremarkable. Near discharge, BUN and  creatinine are 16 and 0.6, sodium 144, potassium 3.9. White count 6000,  hemoglobin 13.4.   She also had an EKG that showed normal sinus rhythm  and also had a CT of her head without contrast that showed no acute  findings and a chest x-ray that was unremarkable. She was discharged to  home following stabilization on 01/07. She was told to resume her  maintenance medications including lorazepam 0.5 mg t.i.d. as needed for  anxiety, KCl 10 mEq daily, Lasix 20 mg daily, pantoprazole 40 mg daily,  aspirin 81 mg daily. She was told to stay on the Glycolax and  probiotic. New med would be metoprolol XL succinate, Toprol XL 25 one  at bedtime for some elevated blood pressure. Told to follow with Dr. Laurel Curtis in one to two weeks and she will have home healthcare.     Mustapha Nick MD    D: 01/07/2020 12:47:29       T: 01/07/2020 15:25:33     [email or web address]  Job#: [id number]     Doc#: [id number]    CC:  Nicky Watson MD

## 2020-01-08 NOTE — PROGRESS NOTES
Data- discharge order received, pt verbalized agreement to discharge, disposition to previous residencewith home care PT/OT. Action- discharge instructions prepared and given to patient and daughter, pt verbalized understanding. Medication information packet given r/t NEW and/or CHANGED prescriptions emphasizing name/purpose/side effects, pt verbalized understanding. Discharge instruction summary: Diet- Cardiac No Caffeine, Activity- as toleratd, Primary Care Physician as follows: Avril Gooden -[phone] f/u appointment to be made in 1 week, prescription medications filled at [facility]. Response- Pt belongings gathered, IV removed. Disposition is home with PT/OT home care, transported with all belongings, taken to lobby via w/c w/ daughter, no complications.      [facility] INTERVAL HPI/OVERNIGHT EVENTS:  No overnight events. Pt reports upper extremity weakness is slowly improving. Starting to eat more. No other complaints.     MEDICATIONS  (STANDING):  ciprofloxacin     Tablet 500 milliGRAM(s) Oral daily  fluconAZOLE   Tablet 200 milliGRAM(s) Oral daily  gabapentin 300 milliGRAM(s) Oral at bedtime  metoprolol succinate ER 25 milliGRAM(s) Oral daily  pantoprazole    Tablet 40 milliGRAM(s) Oral before breakfast  potassium chloride    Tablet ER 40 milliEquivalent(s) Oral every 4 hours    MEDICATIONS  (PRN):  acetaminophen   Tablet .. 650 milliGRAM(s) Oral every 4 hours PRN Mild Pain (1 - 3)  aluminum hydroxide/magnesium hydroxide/simethicone Suspension 30 milliLiter(s) Oral every 6 hours PRN Dyspepsia  famotidine    Tablet 20 milliGRAM(s) Oral two times a day PRN GERD  oxyCODONE    IR 5 milliGRAM(s) Oral every 6 hours PRN Moderate Pain (4 - 6)  simethicone 80 milliGRAM(s) Chew two times a day PRN Gas    Allergies    penicillin G benzathine (Rash)  sulfa drugs (Rash)    Intolerances          VITAL SIGNS:  T(F): 97.9 (12-30-19 @ 04:01)  HR: 78 (12-30-19 @ 04:01)  BP: 120/66 (12-30-19 @ 04:01)  RR: 18 (12-30-19 @ 04:01)  SpO2: 97% (12-30-19 @ 04:01)  Wt(kg): --    PHYSICAL EXAM:    Constitutional: NAD, lying comfortably in bed  Eyes: EOMI, PERRLA  Neck: supple, no masses, no JVD  Respiratory: CTAB; no r/r/w  Cardiovascular: RRR, no M/R/G  Gastrointestinal: +BS, soft, NTND, no hepatosplenomegaly; pleurex in place  Extremities: no c/c/e  Neurological: AAOx3, upper extremity weak, no other focal deficits     LABS:                        7.6    2.29  )-----------( 113      ( 30 Dec 2019 08:57 )             22.9     12-30    137  |  100  |  7   ----------------------------<  114<H>  3.1<L>   |  25  |  0.55    Ca    7.8<L>      30 Dec 2019 07:11            RADIOLOGY & ADDITIONAL TESTS:  Studies reviewed.

## 2020-04-22 PROBLEM — R19.00 ABDOMINAL LUMP: Status: ACTIVE | Noted: 2020-04-22

## 2020-04-29 ENCOUNTER — OFFICE VISIT (OUTPATIENT)
Dept: SURGERY | Age: 85
End: 2020-04-29
Payer: MEDICARE

## 2020-04-29 VITALS
HEIGHT: 60 IN | TEMPERATURE: 97.7 F | SYSTOLIC BLOOD PRESSURE: 151 MMHG | DIASTOLIC BLOOD PRESSURE: 89 MMHG | BODY MASS INDEX: 29.06 KG/M2 | WEIGHT: 148 LBS

## 2020-04-29 PROCEDURE — 1123F ACP DISCUSS/DSCN MKR DOCD: CPT | Performed by: SURGERY

## 2020-04-29 PROCEDURE — 1036F TOBACCO NON-USER: CPT | Performed by: SURGERY

## 2020-04-29 PROCEDURE — 99213 OFFICE O/P EST LOW 20 MIN: CPT | Performed by: SURGERY

## 2020-04-29 PROCEDURE — G8417 CALC BMI ABV UP PARAM F/U: HCPCS | Performed by: SURGERY

## 2020-04-29 PROCEDURE — 1090F PRES/ABSN URINE INCON ASSESS: CPT | Performed by: SURGERY

## 2020-04-29 PROCEDURE — G8427 DOCREV CUR MEDS BY ELIG CLIN: HCPCS | Performed by: SURGERY

## 2020-04-29 PROCEDURE — 4040F PNEUMOC VAC/ADMIN/RCVD: CPT | Performed by: SURGERY

## 2020-05-26 ENCOUNTER — TELEPHONE (OUTPATIENT)
Dept: SURGERY | Age: 85
End: 2020-05-26

## 2020-05-28 ENCOUNTER — APPOINTMENT (OUTPATIENT)
Dept: GENERAL RADIOLOGY | Age: 85
End: 2020-05-28
Payer: MEDICARE

## 2020-05-28 ENCOUNTER — HOSPITAL ENCOUNTER (OUTPATIENT)
Age: 85
Setting detail: OBSERVATION
Discharge: HOME | End: 2020-05-29
Attending: EMERGENCY MEDICINE | Admitting: INTERNAL MEDICINE
Payer: MEDICARE

## 2020-05-28 PROBLEM — R07.2 PRECORDIAL PAIN: Status: ACTIVE | Noted: 2020-05-28

## 2020-05-28 LAB
A/G RATIO: 1.7 (ref 1.1–2.2)
ALBUMIN SERPL-MCNC: 4.2 G/DL (ref 3.4–5)
ALP BLD-CCNC: 53 U/L (ref 40–129)
ALT SERPL-CCNC: 9 U/L (ref 10–40)
ANION GAP SERPL CALCULATED.3IONS-SCNC: 11 MMOL/L (ref 3–16)
AST SERPL-CCNC: 19 U/L (ref 15–37)
BASOPHILS ABSOLUTE: 0 K/UL (ref 0–0.2)
BASOPHILS RELATIVE PERCENT: 0.3 %
BILIRUB SERPL-MCNC: 0.5 MG/DL (ref 0–1)
BILIRUBIN URINE: NEGATIVE
BLOOD, URINE: NEGATIVE
BUN BLDV-MCNC: 15 MG/DL (ref 7–20)
CALCIUM SERPL-MCNC: 9.5 MG/DL (ref 8.3–10.6)
CHLORIDE BLD-SCNC: 103 MMOL/L (ref 99–110)
CLARITY: CLEAR
CO2: 24 MMOL/L (ref 21–32)
COLOR: YELLOW
CREAT SERPL-MCNC: 0.6 MG/DL (ref 0.6–1.2)
EKG ATRIAL RATE: 73 BPM
EKG DIAGNOSIS: NORMAL
EKG P AXIS: 50 DEGREES
EKG P-R INTERVAL: 160 MS
EKG Q-T INTERVAL: 356 MS
EKG QRS DURATION: 64 MS
EKG QTC CALCULATION (BAZETT): 392 MS
EKG R AXIS: -5 DEGREES
EKG T AXIS: -4 DEGREES
EKG VENTRICULAR RATE: 73 BPM
EOSINOPHILS ABSOLUTE: 0 K/UL (ref 0–0.6)
EOSINOPHILS RELATIVE PERCENT: 0.5 %
GFR AFRICAN AMERICAN: >60
GFR NON-AFRICAN AMERICAN: >60
GLOBULIN: 2.5 G/DL
GLUCOSE BLD-MCNC: 101 MG/DL (ref 70–99)
GLUCOSE URINE: NEGATIVE MG/DL
HCT VFR BLD CALC: 42.8 % (ref 36–48)
HEMOGLOBIN: 14.3 G/DL (ref 12–16)
INR BLD: 0.92 (ref 0.86–1.14)
KETONES, URINE: NEGATIVE MG/DL
LEUKOCYTE ESTERASE, URINE: NEGATIVE
LYMPHOCYTES ABSOLUTE: 1 K/UL (ref 1–5.1)
LYMPHOCYTES RELATIVE PERCENT: 19.4 %
MCH RBC QN AUTO: 31 PG (ref 26–34)
MCHC RBC AUTO-ENTMCNC: 33.5 G/DL (ref 31–36)
MCV RBC AUTO: 92.4 FL (ref 80–100)
MICROSCOPIC EXAMINATION: NORMAL
MONOCYTES ABSOLUTE: 0.5 K/UL (ref 0–1.3)
MONOCYTES RELATIVE PERCENT: 9.6 %
NEUTROPHILS ABSOLUTE: 3.5 K/UL (ref 1.7–7.7)
NEUTROPHILS RELATIVE PERCENT: 70.2 %
NITRITE, URINE: NEGATIVE
PDW BLD-RTO: 12.8 % (ref 12.4–15.4)
PH UA: 7 (ref 5–8)
PLATELET # BLD: 223 K/UL (ref 135–450)
PMV BLD AUTO: 8.8 FL (ref 5–10.5)
POTASSIUM REFLEX MAGNESIUM: 3.9 MMOL/L (ref 3.5–5.1)
PRO-BNP: 225 PG/ML (ref 0–449)
PROTEIN UA: NEGATIVE MG/DL
PROTHROMBIN TIME: 10.7 SEC (ref 10–13.2)
RBC # BLD: 4.63 M/UL (ref 4–5.2)
SODIUM BLD-SCNC: 138 MMOL/L (ref 136–145)
SPECIFIC GRAVITY UA: 1.01 (ref 1–1.03)
TOTAL PROTEIN: 6.7 G/DL (ref 6.4–8.2)
TROPONIN: <0.01 NG/ML
URINE REFLEX TO CULTURE: NORMAL
URINE TYPE: NORMAL
UROBILINOGEN, URINE: 0.2 E.U./DL
WBC # BLD: 5 K/UL (ref 4–11)

## 2020-05-28 PROCEDURE — 84484 ASSAY OF TROPONIN QUANT: CPT

## 2020-05-28 PROCEDURE — 6370000000 HC RX 637 (ALT 250 FOR IP): Performed by: INTERNAL MEDICINE

## 2020-05-28 PROCEDURE — 6360000002 HC RX W HCPCS: Performed by: INTERNAL MEDICINE

## 2020-05-28 PROCEDURE — 93010 ELECTROCARDIOGRAM REPORT: CPT | Performed by: INTERNAL MEDICINE

## 2020-05-28 PROCEDURE — 6370000000 HC RX 637 (ALT 250 FOR IP): Performed by: NURSE PRACTITIONER

## 2020-05-28 PROCEDURE — 36415 COLL VENOUS BLD VENIPUNCTURE: CPT

## 2020-05-28 PROCEDURE — 99285 EMERGENCY DEPT VISIT HI MDM: CPT

## 2020-05-28 PROCEDURE — 81003 URINALYSIS AUTO W/O SCOPE: CPT

## 2020-05-28 PROCEDURE — 71045 X-RAY EXAM CHEST 1 VIEW: CPT

## 2020-05-28 PROCEDURE — G0378 HOSPITAL OBSERVATION PER HR: HCPCS

## 2020-05-28 PROCEDURE — 2580000003 HC RX 258: Performed by: INTERNAL MEDICINE

## 2020-05-28 PROCEDURE — 80053 COMPREHEN METABOLIC PANEL: CPT

## 2020-05-28 PROCEDURE — 93005 ELECTROCARDIOGRAM TRACING: CPT | Performed by: NURSE PRACTITIONER

## 2020-05-28 PROCEDURE — 94760 N-INVAS EAR/PLS OXIMETRY 1: CPT

## 2020-05-28 PROCEDURE — 85025 COMPLETE CBC W/AUTO DIFF WBC: CPT

## 2020-05-28 PROCEDURE — 93005 ELECTROCARDIOGRAM TRACING: CPT | Performed by: EMERGENCY MEDICINE

## 2020-05-28 PROCEDURE — 83880 ASSAY OF NATRIURETIC PEPTIDE: CPT

## 2020-05-28 PROCEDURE — 85610 PROTHROMBIN TIME: CPT

## 2020-05-28 RX ORDER — ATORVASTATIN CALCIUM 40 MG/1
40 TABLET, FILM COATED ORAL NIGHTLY
Status: DISCONTINUED | OUTPATIENT
Start: 2020-05-28 | End: 2020-05-29 | Stop reason: HOSPADM

## 2020-05-28 RX ORDER — ASPIRIN 81 MG/1
81 TABLET, CHEWABLE ORAL DAILY
Status: DISCONTINUED | OUTPATIENT
Start: 2020-05-29 | End: 2020-05-29 | Stop reason: HOSPADM

## 2020-05-28 RX ORDER — ANTIOX #8/OM3/DHA/EPA/LUT/ZEAX 250-2.5 MG
2 CAPSULE ORAL DAILY
Status: DISCONTINUED | OUTPATIENT
Start: 2020-05-28 | End: 2020-05-28

## 2020-05-28 RX ORDER — POLYETHYLENE GLYCOL 3350 17 G/17G
17 POWDER, FOR SOLUTION ORAL DAILY PRN
Status: DISCONTINUED | OUTPATIENT
Start: 2020-05-28 | End: 2020-05-29 | Stop reason: HOSPADM

## 2020-05-28 RX ORDER — ONDANSETRON 2 MG/ML
4 INJECTION INTRAMUSCULAR; INTRAVENOUS EVERY 6 HOURS PRN
Status: DISCONTINUED | OUTPATIENT
Start: 2020-05-28 | End: 2020-05-29 | Stop reason: HOSPADM

## 2020-05-28 RX ORDER — LORAZEPAM 0.5 MG/1
0.5 TABLET ORAL EVERY 6 HOURS PRN
Status: DISCONTINUED | OUTPATIENT
Start: 2020-05-28 | End: 2020-05-29 | Stop reason: HOSPADM

## 2020-05-28 RX ORDER — LORAZEPAM 0.5 MG/1
0.5 TABLET ORAL ONCE
Status: COMPLETED | OUTPATIENT
Start: 2020-05-28 | End: 2020-05-28

## 2020-05-28 RX ORDER — SODIUM CHLORIDE 0.9 % (FLUSH) 0.9 %
10 SYRINGE (ML) INJECTION PRN
Status: DISCONTINUED | OUTPATIENT
Start: 2020-05-28 | End: 2020-05-29 | Stop reason: HOSPADM

## 2020-05-28 RX ORDER — SODIUM CHLORIDE 0.9 % (FLUSH) 0.9 %
10 SYRINGE (ML) INJECTION EVERY 12 HOURS SCHEDULED
Status: DISCONTINUED | OUTPATIENT
Start: 2020-05-28 | End: 2020-05-29 | Stop reason: HOSPADM

## 2020-05-28 RX ORDER — POTASSIUM CHLORIDE 750 MG/1
10 TABLET, FILM COATED, EXTENDED RELEASE ORAL DAILY
Status: DISCONTINUED | OUTPATIENT
Start: 2020-05-29 | End: 2020-05-29 | Stop reason: HOSPADM

## 2020-05-28 RX ORDER — CALCIUM CARBONATE 500(1250)
1000 TABLET ORAL DAILY
COMMUNITY
End: 2022-06-07 | Stop reason: SDUPTHER

## 2020-05-28 RX ORDER — METOPROLOL SUCCINATE 25 MG/1
25 TABLET, EXTENDED RELEASE ORAL DAILY
Status: DISCONTINUED | OUTPATIENT
Start: 2020-05-29 | End: 2020-05-29 | Stop reason: HOSPADM

## 2020-05-28 RX ORDER — PROMETHAZINE HYDROCHLORIDE 25 MG/1
12.5 TABLET ORAL EVERY 6 HOURS PRN
Status: DISCONTINUED | OUTPATIENT
Start: 2020-05-28 | End: 2020-05-29 | Stop reason: HOSPADM

## 2020-05-28 RX ORDER — LATANOPROST 50 UG/ML
1 SOLUTION/ DROPS OPHTHALMIC NIGHTLY
Status: DISCONTINUED | OUTPATIENT
Start: 2020-05-28 | End: 2020-05-29 | Stop reason: HOSPADM

## 2020-05-28 RX ORDER — ACETAMINOPHEN 650 MG/1
650 SUPPOSITORY RECTAL EVERY 6 HOURS PRN
Status: DISCONTINUED | OUTPATIENT
Start: 2020-05-28 | End: 2020-05-29 | Stop reason: HOSPADM

## 2020-05-28 RX ORDER — FUROSEMIDE 20 MG/1
20 TABLET ORAL DAILY
Status: DISCONTINUED | OUTPATIENT
Start: 2020-05-29 | End: 2020-05-29 | Stop reason: HOSPADM

## 2020-05-28 RX ORDER — PANTOPRAZOLE SODIUM 40 MG/1
40 TABLET, DELAYED RELEASE ORAL
Status: DISCONTINUED | OUTPATIENT
Start: 2020-05-29 | End: 2020-05-29 | Stop reason: HOSPADM

## 2020-05-28 RX ORDER — ASPIRIN 81 MG/1
324 TABLET, CHEWABLE ORAL ONCE
Status: COMPLETED | OUTPATIENT
Start: 2020-05-28 | End: 2020-05-28

## 2020-05-28 RX ORDER — ACETAMINOPHEN 325 MG/1
650 TABLET ORAL EVERY 6 HOURS PRN
Status: DISCONTINUED | OUTPATIENT
Start: 2020-05-28 | End: 2020-05-29 | Stop reason: HOSPADM

## 2020-05-28 RX ADMIN — LORAZEPAM 0.5 MG: 0.5 TABLET ORAL at 13:37

## 2020-05-28 RX ADMIN — ATORVASTATIN CALCIUM 40 MG: 40 TABLET, FILM COATED ORAL at 20:38

## 2020-05-28 RX ADMIN — LORAZEPAM 0.5 MG: 0.5 TABLET ORAL at 20:48

## 2020-05-28 RX ADMIN — SODIUM CHLORIDE, PRESERVATIVE FREE 10 ML: 5 INJECTION INTRAVENOUS at 20:49

## 2020-05-28 RX ADMIN — LATANOPROST 1 DROP: 50 SOLUTION OPHTHALMIC at 21:33

## 2020-05-28 RX ADMIN — ASPIRIN 81 MG 324 MG: 81 TABLET ORAL at 13:26

## 2020-05-28 ASSESSMENT — HEART SCORE
ECG: 0
ECG: 0

## 2020-05-28 ASSESSMENT — PAIN SCALES - GENERAL: PAINLEVEL_OUTOF10: 0

## 2020-05-28 NOTE — ED NOTES
[place] ([facility]) sandwich and soda given to patient.      Silver Cunningham RN  05/28/20 5644

## 2020-05-28 NOTE — ED NOTES
Bed: [facility]  Expected date:   Expected time:   Means of arrival: [facility]  Comments:  89F SOB     Rhona Linares RN  05/28/20 1045

## 2020-05-29 ENCOUNTER — OFFICE VISIT (OUTPATIENT)
Dept: PRIMARY CARE CLINIC | Age: 85
End: 2020-05-29

## 2020-05-29 VITALS
TEMPERATURE: 97.8 F | WEIGHT: 145.06 LBS | HEIGHT: 60 IN | DIASTOLIC BLOOD PRESSURE: 78 MMHG | OXYGEN SATURATION: 94 % | HEART RATE: 64 BPM | SYSTOLIC BLOOD PRESSURE: 149 MMHG | RESPIRATION RATE: 17 BRPM | BODY MASS INDEX: 28.48 KG/M2

## 2020-05-29 LAB
EKG ATRIAL RATE: 68 BPM
EKG DIAGNOSIS: NORMAL
EKG P AXIS: 43 DEGREES
EKG P-R INTERVAL: 168 MS
EKG Q-T INTERVAL: 372 MS
EKG QRS DURATION: 72 MS
EKG QTC CALCULATION (BAZETT): 395 MS
EKG R AXIS: -10 DEGREES
EKG T AXIS: 1 DEGREES
EKG VENTRICULAR RATE: 68 BPM
HCT VFR BLD CALC: 38.9 % (ref 36–48)
HEMOGLOBIN: 13.2 G/DL (ref 12–16)
MCH RBC QN AUTO: 31.5 PG (ref 26–34)
MCHC RBC AUTO-ENTMCNC: 33.8 G/DL (ref 31–36)
MCV RBC AUTO: 93 FL (ref 80–100)
PDW BLD-RTO: 13.1 % (ref 12.4–15.4)
PLATELET # BLD: 196 K/UL (ref 135–450)
PMV BLD AUTO: 8 FL (ref 5–10.5)
RBC # BLD: 4.18 M/UL (ref 4–5.2)
WBC # BLD: 5 K/UL (ref 4–11)

## 2020-05-29 PROCEDURE — 85027 COMPLETE CBC AUTOMATED: CPT

## 2020-05-29 PROCEDURE — 93010 ELECTROCARDIOGRAM REPORT: CPT | Performed by: INTERNAL MEDICINE

## 2020-05-29 PROCEDURE — 94760 N-INVAS EAR/PLS OXIMETRY 1: CPT

## 2020-05-29 PROCEDURE — 36415 COLL VENOUS BLD VENIPUNCTURE: CPT

## 2020-05-29 PROCEDURE — 6370000000 HC RX 637 (ALT 250 FOR IP): Performed by: INTERNAL MEDICINE

## 2020-05-29 PROCEDURE — G0378 HOSPITAL OBSERVATION PER HR: HCPCS

## 2020-05-29 PROCEDURE — 99236 HOSP IP/OBS SAME DATE HI 85: CPT | Performed by: INTERNAL MEDICINE

## 2020-05-29 PROCEDURE — 99205 OFFICE O/P NEW HI 60 MIN: CPT | Performed by: INTERNAL MEDICINE

## 2020-05-29 RX ADMIN — POTASSIUM CHLORIDE 10 MEQ: 750 TABLET, FILM COATED, EXTENDED RELEASE ORAL at 08:30

## 2020-05-29 RX ADMIN — PANTOPRAZOLE SODIUM 40 MG: 40 TABLET, DELAYED RELEASE ORAL at 05:15

## 2020-05-29 RX ADMIN — LORAZEPAM 0.5 MG: 0.5 TABLET ORAL at 11:10

## 2020-05-29 RX ADMIN — FUROSEMIDE 20 MG: 20 TABLET ORAL at 08:30

## 2020-05-29 RX ADMIN — ASPIRIN 81 MG 81 MG: 81 TABLET ORAL at 08:30

## 2020-05-29 RX ADMIN — METOPROLOL SUCCINATE 25 MG: 25 TABLET, EXTENDED RELEASE ORAL at 08:30

## 2020-05-29 ASSESSMENT — PAIN SCALES - GENERAL
PAINLEVEL_OUTOF10: 0
PAINLEVEL_OUTOF10: 0

## 2020-05-29 NOTE — H&P
[address] 16                              HISTORY AND PHYSICAL    PATIENT NAME: Parveen Maya                 :        1929  MED REC NO:   [id number]                          ROOM:       4798  ACCOUNT NO:   [de-identified]                           ADMIT DATE: 2020  PROVIDER:     David Owens MD    REASON FOR ADMISSION:  Chest pain. HISTORY OF PRESENT ILLNESS:  This is a 24-year-old white female with a  history of hypertension, hyperlipidemia who presented to the emergency  room with some substernal chest pain. The patient went to bed the night  before admission stating she just did not feel well overall and took  some Tylenol to try to get some rest and woke up in the morning with  some substernal chest pain. The patient had no associated nausea or  shortness of breath with this and there was no radiation or discomfort  but it was a vague, nonspecific pain in her center of chest.  It was not  related to exertion and over time, eventually, brought her to emergency  room. At the time of the interview early this past evening, the patient  did not again have any significant chest pain at this point. She was  not short of breath again. REVIEW OF SYSTEMS:  No exertional components of this. No nausea or  vomiting. No diarrhea. No PND or orthopnea. No cough or sputum. The  review of systems is otherwise negative. PAST MEDICAL HISTORY:  Significant for hypertension, hyperlipidemia. She has gastroesophageal reflux disease. She has a known history of C.  diff infection in  and she has glaucoma. She also has macular  degeneration. PAST SURGICAL HISTORY:  Significant for hysterectomy in .   She had  bilateral cataract removals in , left ventral hernia repair in ,  and she had a right total knee arthroplasty in  and she had several  varicose vein surgeries in the

## 2020-05-29 NOTE — CONSULTS
0830    sodium chloride flush 0.9 % injection 10 mL  10 mL Intravenous 2 times per day Jorge Crespo MD   10 mL at 05/28/20 2049    sodium chloride flush 0.9 % injection 10 mL  10 mL Intravenous PRN Jorge Crespo MD        acetaminophen (TYLENOL) tablet 650 mg  650 mg Oral Q6H PRN Jorge Crespo MD        Or    acetaminophen (TYLENOL) suppository 650 mg  650 mg Rectal Q6H PRN Jorge Crespo MD        promethazine (PHENERGAN) tablet 12.5 mg  12.5 mg Oral Q6H PRN Jorge Crespo MD        Or    [facility] injection 4 mg  4 mg Intravenous Q6H PRN Jorge Crespo MD        atorvastatin (LIPITOR) tablet 40 mg  40 mg Oral Nightly Jorge Crespo MD   40 mg at 05/28/20 2038    aspirin chewable tablet 81 mg  81 mg Oral Daily Jorge Crespo MD   81 mg at 05/29/20 0830    enoxaparin (LOVENOX) injection 40 mg  40 mg Subcutaneous Nightly Jorge Crespo MD           Physical Exam:   BP (!) 155/81   Pulse 68   Temp 97.8 °F (36.6 °C) (Oral)   Resp 16   Ht 5' (1.524 m)   Wt 145 lb 1 oz (65.8 kg)   SpO2 94%   BMI 28.33 kg/m²     Intake/Output Summary (Last 24 hours) at 5/29/2020 0855  Last data filed at 5/28/2020 2049  Gross per 24 hour   Intake 10 ml   Output --   Net 10 ml     Wt Readings from Last 2 Encounters:   05/29/20 145 lb 1 oz (65.8 kg)   04/29/20 148 lb (67.1 kg)     Constitutional: She is oriented to person, place, and time. She appears well-developed and well-nourished. In no acute distress. Head: Normocephalic and atraumatic. Neck: Neck supple. No JVD present. Carotid bruit is not present. No mass and no thyromegaly present. No lymphadenopathy present. Cardiovascular: Normal rate, regular rhythm, normal heart sounds and intact distal pulses. Exam reveals no gallop and no friction rub. No murmur heard. Pulmonary/Chest: Effort normal and breath sounds normal. No respiratory distress.  She has no wheezes, exercise without any angina. Continue current medications.

## 2020-05-30 LAB
SARS-COV-2: NOT DETECTED
SOURCE: NORMAL

## 2020-06-03 ENCOUNTER — ANESTHESIA EVENT (OUTPATIENT)
Dept: OPERATING ROOM | Age: 85
End: 2020-06-03
Payer: MEDICARE

## 2020-06-04 ENCOUNTER — HOSPITAL ENCOUNTER (OUTPATIENT)
Age: 85
Setting detail: OUTPATIENT SURGERY
Discharge: HOME | End: 2020-06-04
Attending: SURGERY | Admitting: SURGERY
Payer: MEDICARE

## 2020-06-04 ENCOUNTER — ANESTHESIA (OUTPATIENT)
Dept: OPERATING ROOM | Age: 85
End: 2020-06-04
Payer: MEDICARE

## 2020-06-04 VITALS
HEIGHT: 60 IN | SYSTOLIC BLOOD PRESSURE: 164 MMHG | BODY MASS INDEX: 28.39 KG/M2 | TEMPERATURE: 97.6 F | OXYGEN SATURATION: 98 % | DIASTOLIC BLOOD PRESSURE: 80 MMHG | RESPIRATION RATE: 12 BRPM | HEART RATE: 58 BPM | WEIGHT: 144.62 LBS

## 2020-06-04 VITALS
SYSTOLIC BLOOD PRESSURE: 124 MMHG | DIASTOLIC BLOOD PRESSURE: 56 MMHG | TEMPERATURE: 95 F | OXYGEN SATURATION: 100 % | RESPIRATION RATE: 13 BRPM

## 2020-06-04 PROCEDURE — 49505 PRP I/HERN INIT REDUC >5 YR: CPT | Performed by: SURGERY

## 2020-06-04 PROCEDURE — 3600000013 HC SURGERY LEVEL 3 ADDTL 15MIN: Performed by: SURGERY

## 2020-06-04 PROCEDURE — 7100000000 HC PACU RECOVERY - FIRST 15 MIN: Performed by: SURGERY

## 2020-06-04 PROCEDURE — 2500000003 HC RX 250 WO HCPCS: Performed by: SURGERY

## 2020-06-04 PROCEDURE — 2500000003 HC RX 250 WO HCPCS

## 2020-06-04 PROCEDURE — 2580000003 HC RX 258: Performed by: SURGERY

## 2020-06-04 PROCEDURE — 7100000010 HC PHASE II RECOVERY - FIRST 15 MIN: Performed by: SURGERY

## 2020-06-04 PROCEDURE — 6360000002 HC RX W HCPCS: Performed by: ANESTHESIOLOGY

## 2020-06-04 PROCEDURE — 6360000002 HC RX W HCPCS

## 2020-06-04 PROCEDURE — 3600000003 HC SURGERY LEVEL 3 BASE: Performed by: SURGERY

## 2020-06-04 PROCEDURE — 6360000002 HC RX W HCPCS: Performed by: SURGERY

## 2020-06-04 PROCEDURE — 2580000003 HC RX 258: Performed by: ANESTHESIOLOGY

## 2020-06-04 PROCEDURE — 2709999900 HC NON-CHARGEABLE SUPPLY: Performed by: SURGERY

## 2020-06-04 PROCEDURE — 3700000001 HC ADD 15 MINUTES (ANESTHESIA): Performed by: SURGERY

## 2020-06-04 PROCEDURE — 3700000000 HC ANESTHESIA ATTENDED CARE: Performed by: SURGERY

## 2020-06-04 PROCEDURE — 7100000011 HC PHASE II RECOVERY - ADDTL 15 MIN: Performed by: SURGERY

## 2020-06-04 PROCEDURE — C1781 MESH (IMPLANTABLE): HCPCS | Performed by: SURGERY

## 2020-06-04 PROCEDURE — 7100000001 HC PACU RECOVERY - ADDTL 15 MIN: Performed by: SURGERY

## 2020-06-04 PROCEDURE — 93005 ELECTROCARDIOGRAM TRACING: CPT

## 2020-06-04 DEVICE — MESH HERN W3XL6IN INGUINAL POLYPR MFIL RECTANG: Type: IMPLANTABLE DEVICE | Site: GROIN | Status: FUNCTIONAL

## 2020-06-04 RX ORDER — SODIUM CHLORIDE 9 MG/ML
INJECTION, SOLUTION INTRAVENOUS CONTINUOUS
Status: DISCONTINUED | OUTPATIENT
Start: 2020-06-04 | End: 2020-06-04 | Stop reason: HOSPADM

## 2020-06-04 RX ORDER — MAGNESIUM HYDROXIDE 1200 MG/15ML
LIQUID ORAL CONTINUOUS PRN
Status: COMPLETED | OUTPATIENT
Start: 2020-06-04 | End: 2020-06-04

## 2020-06-04 RX ORDER — SODIUM CHLORIDE 0.9 % (FLUSH) 0.9 %
10 SYRINGE (ML) INJECTION EVERY 12 HOURS SCHEDULED
Status: DISCONTINUED | OUTPATIENT
Start: 2020-06-04 | End: 2020-06-04 | Stop reason: HOSPADM

## 2020-06-04 RX ORDER — IBUPROFEN 400 MG/1
400 TABLET ORAL 4 TIMES DAILY PRN
Qty: 80 TABLET | Refills: 0 | Status: SHIPPED | OUTPATIENT
Start: 2020-06-04 | End: 2021-06-29

## 2020-06-04 RX ORDER — ACETAMINOPHEN 325 MG/1
650 TABLET ORAL EVERY 6 HOURS PRN
Qty: 120 TABLET | Refills: 0 | Status: ON HOLD | OUTPATIENT
Start: 2020-06-04 | End: 2021-07-01 | Stop reason: HOSPADM

## 2020-06-04 RX ORDER — PROPOFOL 10 MG/ML
INJECTION, EMULSION INTRAVENOUS PRN
Status: DISCONTINUED | OUTPATIENT
Start: 2020-06-04 | End: 2020-06-04 | Stop reason: SDUPTHER

## 2020-06-04 RX ORDER — PROPOFOL 10 MG/ML
INJECTION, EMULSION INTRAVENOUS CONTINUOUS PRN
Status: DISCONTINUED | OUTPATIENT
Start: 2020-06-04 | End: 2020-06-04 | Stop reason: SDUPTHER

## 2020-06-04 RX ORDER — SODIUM CHLORIDE 0.9 % (FLUSH) 0.9 %
10 SYRINGE (ML) INJECTION PRN
Status: DISCONTINUED | OUTPATIENT
Start: 2020-06-04 | End: 2020-06-04 | Stop reason: HOSPADM

## 2020-06-04 RX ORDER — FENTANYL CITRATE 50 UG/ML
25 INJECTION, SOLUTION INTRAMUSCULAR; INTRAVENOUS EVERY 5 MIN PRN
Status: DISCONTINUED | OUTPATIENT
Start: 2020-06-04 | End: 2020-06-04 | Stop reason: HOSPADM

## 2020-06-04 RX ORDER — TRAMADOL HYDROCHLORIDE 50 MG/1
50 TABLET ORAL EVERY 4 HOURS PRN
Qty: 30 TABLET | Refills: 0 | Status: SHIPPED | OUTPATIENT
Start: 2020-06-04 | End: 2020-06-09

## 2020-06-04 RX ORDER — ONDANSETRON 2 MG/ML
4 INJECTION INTRAMUSCULAR; INTRAVENOUS
Status: DISCONTINUED | OUTPATIENT
Start: 2020-06-04 | End: 2020-06-04 | Stop reason: HOSPADM

## 2020-06-04 RX ORDER — LIDOCAINE HYDROCHLORIDE 20 MG/ML
INJECTION, SOLUTION INFILTRATION; PERINEURAL PRN
Status: DISCONTINUED | OUTPATIENT
Start: 2020-06-04 | End: 2020-06-04 | Stop reason: SDUPTHER

## 2020-06-04 RX ORDER — FENTANYL CITRATE 50 UG/ML
INJECTION, SOLUTION INTRAMUSCULAR; INTRAVENOUS PRN
Status: DISCONTINUED | OUTPATIENT
Start: 2020-06-04 | End: 2020-06-04 | Stop reason: SDUPTHER

## 2020-06-04 RX ADMIN — SODIUM CHLORIDE: 9 INJECTION, SOLUTION INTRAVENOUS at 14:28

## 2020-06-04 RX ADMIN — SODIUM CHLORIDE: 9 INJECTION, SOLUTION INTRAVENOUS at 11:10

## 2020-06-04 RX ADMIN — PROPOFOL 30 MG: 10 INJECTION, EMULSION INTRAVENOUS at 14:38

## 2020-06-04 RX ADMIN — PROPOFOL 20 MG: 10 INJECTION, EMULSION INTRAVENOUS at 15:15

## 2020-06-04 RX ADMIN — FENTANYL CITRATE 25 MCG: 50 INJECTION INTRAMUSCULAR; INTRAVENOUS at 15:10

## 2020-06-04 RX ADMIN — FENTANYL CITRATE 25 MCG: 50 INJECTION INTRAMUSCULAR; INTRAVENOUS at 15:24

## 2020-06-04 RX ADMIN — FENTANYL CITRATE 25 MCG: 50 INJECTION INTRAMUSCULAR; INTRAVENOUS at 15:21

## 2020-06-04 RX ADMIN — PROPOFOL 50 MG: 10 INJECTION, EMULSION INTRAVENOUS at 14:34

## 2020-06-04 RX ADMIN — FENTANYL CITRATE 25 MCG: 50 INJECTION, SOLUTION INTRAMUSCULAR; INTRAVENOUS at 16:28

## 2020-06-04 RX ADMIN — FENTANYL CITRATE 25 MCG: 50 INJECTION INTRAMUSCULAR; INTRAVENOUS at 14:38

## 2020-06-04 RX ADMIN — LIDOCAINE HYDROCHLORIDE 100 MG: 20 INJECTION, SOLUTION INFILTRATION; PERINEURAL at 14:34

## 2020-06-04 RX ADMIN — CEFAZOLIN SODIUM 2 G: 10 INJECTION, POWDER, FOR SOLUTION INTRAVENOUS at 14:28

## 2020-06-04 RX ADMIN — PROPOFOL 75 MCG/KG/MIN: 10 INJECTION, EMULSION INTRAVENOUS at 14:34

## 2020-06-04 ASSESSMENT — PAIN DESCRIPTION - DESCRIPTORS
DESCRIPTORS: TIGHTNESS
DESCRIPTORS: TIGHTNESS

## 2020-06-04 ASSESSMENT — PULMONARY FUNCTION TESTS
PIF_VALUE: 0
PIF_VALUE: 1
PIF_VALUE: 0
PIF_VALUE: 1
PIF_VALUE: 0

## 2020-06-04 ASSESSMENT — PAIN SCALES - GENERAL
PAINLEVEL_OUTOF10: 0
PAINLEVEL_OUTOF10: 3
PAINLEVEL_OUTOF10: 3
PAINLEVEL_OUTOF10: 0

## 2020-06-04 ASSESSMENT — PAIN DESCRIPTION - ORIENTATION
ORIENTATION: MID
ORIENTATION: MID

## 2020-06-04 ASSESSMENT — PAIN DESCRIPTION - PAIN TYPE
TYPE: ACUTE PAIN
TYPE: ACUTE PAIN

## 2020-06-04 ASSESSMENT — PAIN DESCRIPTION - LOCATION
LOCATION: CHEST
LOCATION: CHEST

## 2020-06-04 ASSESSMENT — PAIN DESCRIPTION - ONSET
ONSET: SUDDEN
ONSET: SUDDEN

## 2020-06-04 ASSESSMENT — LIFESTYLE VARIABLES: SMOKING_STATUS: 0

## 2020-06-04 ASSESSMENT — PAIN - FUNCTIONAL ASSESSMENT: PAIN_FUNCTIONAL_ASSESSMENT: 0-10

## 2020-06-04 ASSESSMENT — PAIN DESCRIPTION - PROGRESSION
CLINICAL_PROGRESSION: NOT CHANGED
CLINICAL_PROGRESSION: NOT CHANGED

## 2020-06-04 ASSESSMENT — PAIN DESCRIPTION - FREQUENCY
FREQUENCY: CONTINUOUS
FREQUENCY: CONTINUOUS

## 2020-06-04 ASSESSMENT — ENCOUNTER SYMPTOMS: SHORTNESS OF BREATH: 0

## 2020-06-04 NOTE — ANESTHESIA PRE PROCEDURE
Smoking status: Never Smoker    Smokeless tobacco: Never Used   Substance Use Topics    Alcohol use: No                                Counseling given: Not Answered      Vital Signs (Current):   Vitals:    06/03/20 1150 06/04/20 1051   BP:  (!) 153/91   Pulse:  76   Resp:  16   Temp:  97.8 °F (36.6 °C)   TempSrc:  Temporal   SpO2:  95%   Weight: 145 lb (65.8 kg) 144 lb 10 oz (65.6 kg)   Height: 5' (1.524 m) 5' (1.524 m)                                              BP Readings from Last 3 Encounters:   06/04/20 (!) 153/91   05/29/20 (!) 149/78   04/29/20 (!) 151/89       NPO Status:                                                     >8hrs                              BMI:   Wt Readings from Last 3 Encounters:   06/04/20 144 lb 10 oz (65.6 kg)   05/29/20 145 lb 1 oz (65.8 kg)   04/29/20 148 lb (67.1 kg)     Body mass index is 28.24 kg/m². CBC:   Lab Results   Component Value Date    WBC 5.0 05/29/2020    RBC 4.18 05/29/2020    HGB 13.2 05/29/2020    HCT 38.9 05/29/2020    MCV 93.0 05/29/2020    RDW 13.1 05/29/2020     05/29/2020       CMP:   Lab Results   Component Value Date     05/28/2020    K 3.9 05/28/2020     05/28/2020    CO2 24 05/28/2020    BUN 15 05/28/2020    CREATININE 0.6 05/28/2020    GFRAA >60 05/28/2020    GFRAA >60 01/15/2013    AGRATIO 1.7 05/28/2020    LABGLOM >60 05/28/2020    LABGLOM 59.9 06/07/2011    GLUCOSE 101 05/28/2020    GLUCOSE 103 06/07/2011    PROT 6.7 05/28/2020    PROT 6.5 01/15/2013    CALCIUM 9.5 05/28/2020    BILITOT 0.5 05/28/2020    ALKPHOS 53 05/28/2020    AST 19 05/28/2020    ALT 9 05/28/2020       POC Tests: No results for input(s): POCGLU, POCNA, POCK, POCCL, POCBUN, POCHEMO, POCHCT in the last 72 hours.     Coags:   Lab Results   Component Value Date    PROTIME 10.7 05/28/2020    INR 0.92 05/28/2020    APTT 28.2 01/27/2014       HCG (If Applicable): No results found for: PREGTESTUR, PREGSERUM, HCG, HCGQUANT     ABGs: No results found for: PHART,

## 2020-06-04 NOTE — OP NOTE
Inguinal Hernia Operative Report      Patient: Rigoberto Mayers MRN: [id number]     YOB: 1929  Age: 80 y.o. Sex: female        Primary Care Physician: Maru Cotto MD         DATE OF OPERATION: 6/4/2020     PREOPERATIVE DIAGNOSIS: right inguinal hernia    POSTOPERATIVE DIAGNOSIS: right inguinal hernia - indirect    PROCEDURE PERFORMED: open right inguinal hernia repair with 3x6 inch Marlex mesh    SURGEON: Bernadette Ding MD     ANESTHESIA: MAC with local anesthetic. ASA CLASS: 3    ANTIBIOTICS: ancef 2 g IV preoperatively. DVT PROPHYLAXIS: Bilateral pneumatic compression boots. INDICATIONS:   The patient was seen in the office for an increasingly symptomatic, right inguinal hernia. As a result, the risks, benefits, and alternatives were discussed at length, including bleeding, infection, nerve pain, and recurrence. The patient's questions were answered and they were agreeable to proceed. DESCRIPTION OF PROCEDURE:   The patient was brought to the operating room suite and placed in the supine position on the operating room table. Anesthesia was induced which he tolerated well. The right groin was clipped free of hair and then prepped and draped in the usual sterile fashion. A timeout was performed, and all parties were in agreement. After injecting local anesthetic, a skin incision was made in the natural skin crease in the right groin. This was carried down sharply through Kenny's fascia. The external oblique fascia was cleared off and then additional local anesthetic was injected subfascially. The external oblique was opened in the direction of its fibers and flaps were raised. The ilioinguinal nerve was identified but sacrificed at its base to avoid any chronic nerve pain. The spermatic cord and its contents were encircled with a Penrose drain. An indirect hernia was isolated which was preperitoneal fat. There was no hernia sac present within the fat.   This was

## 2020-06-05 LAB
EKG ATRIAL RATE: 59 BPM
EKG DIAGNOSIS: NORMAL
EKG P AXIS: 72 DEGREES
EKG P-R INTERVAL: 176 MS
EKG Q-T INTERVAL: 424 MS
EKG QRS DURATION: 78 MS
EKG QTC CALCULATION (BAZETT): 419 MS
EKG R AXIS: -3 DEGREES
EKG T AXIS: 13 DEGREES
EKG VENTRICULAR RATE: 59 BPM

## 2020-06-09 ENCOUNTER — TELEPHONE (OUTPATIENT)
Dept: SURGERY | Age: 85
End: 2020-06-09

## 2020-06-09 NOTE — TELEPHONE ENCOUNTER
Spoke with Kimberly Fernandez. Patient is complaining of constipation. Advised her miralax 2x daily and a stool softener. She is already taking metamucil. Patient is not taking any pain medications unless Kimberly Fernandez makes her for her discomfort/pain. The dizziness maybe coming from her UTI symptoms that she is also having. Advised patient to call PCP for urinalysis to diagnoses and treat.

## 2020-06-09 NOTE — TELEPHONE ENCOUNTER
PT's daughter called and stated that the PT is having some post op issues. She is feeling dizzy and states that she just does not feel right.

## 2020-06-10 ENCOUNTER — TELEPHONE (OUTPATIENT)
Dept: SURGERY | Age: 85
End: 2020-06-10

## 2020-06-10 NOTE — TELEPHONE ENCOUNTER
Daughter called stating that she called yesterday with complaint of severe constipation. Was told to contact patient's PCP. She did that but they won't do anything. What should she do?

## 2020-06-22 ENCOUNTER — OFFICE VISIT (OUTPATIENT)
Dept: SURGERY | Age: 85
End: 2020-06-22

## 2020-06-22 VITALS
HEART RATE: 76 BPM | WEIGHT: 148 LBS | BODY MASS INDEX: 28.9 KG/M2 | SYSTOLIC BLOOD PRESSURE: 145 MMHG | DIASTOLIC BLOOD PRESSURE: 67 MMHG

## 2020-06-22 PROCEDURE — 99024 POSTOP FOLLOW-UP VISIT: CPT | Performed by: SURGERY

## 2020-06-22 NOTE — PROGRESS NOTES
Post Operative Visit    [facility]  [place] [facility]   Maciel Noel MD    [address] [address]  Phone: [phone]  Fax: [phone]    Regina Anderson   YOB: 1929    Date of Visit:  6/22/2020    No ref. provider found  Celeste Kenny MD    Subjective:     Regina Anderson presents for a two week follow-up s/p open right inguinal hernia repair. Overall she has been doing well since her operation. She has been eating/drinking without difficulty. She has not had any nausea and vomiting. She states her constipation (which was present before surgery) was slightly worse last week, but has been improving with the metamucil and miralax. There has been almost complete resolution of her pain. She is not taking any pain. She denies any fevers or chills.     Pain Score:   1    Allergies   Allergen Reactions    Ciprofloxacin Other (See Comments)     Legs tingling    Oxycodone-Acetaminophen      Sick to stomach    Percocet [Oxycodone-Acetaminophen] Nausea And Vomiting     Outpatient Medications Marked as Taking for the 6/22/20 encounter (Office Visit) with Darcie Fraser MD   Medication Sig Dispense Refill    acetaminophen (TYLENOL) 325 MG tablet Take 2 tablets by mouth every 6 hours as needed for Pain 120 tablet 0    ibuprofen (ADVIL;MOTRIN) 400 MG tablet Take 1 tablet by mouth 4 times daily as needed for Pain 80 tablet 0    calcium carbonate (OSCAL) 500 MG TABS tablet Take 1,000 mg by mouth daily      metoprolol succinate (TOPROL XL) 25 MG extended release tablet TAKE ONE TABLET BY MOUTH DAILY 30 tablet 2    LORazepam (ATIVAN) 0.5 MG tablet TAKE ONE TABLET BY MOUTH THREE TIMES A DAY AS NEEDED FOR ANXIETY 90 tablet 1    pantoprazole (PROTONIX) 40 MG tablet TAKE ONE TABLET BY MOUTH EVERY MORNING 90 tablet 3    furosemide (LASIX) 20 MG tablet TAKE ONE TABLET BY MOUTH EVERY MORNING 90 tablet 0    Cranberry 450

## 2020-10-09 ENCOUNTER — HOSPITAL ENCOUNTER (EMERGENCY)
Age: 85
Discharge: HOME | End: 2020-10-09
Attending: STUDENT IN AN ORGANIZED HEALTH CARE EDUCATION/TRAINING PROGRAM
Payer: MEDICARE

## 2020-10-09 ENCOUNTER — APPOINTMENT (OUTPATIENT)
Dept: CT IMAGING | Age: 85
End: 2020-10-09
Payer: MEDICARE

## 2020-10-09 ENCOUNTER — APPOINTMENT (OUTPATIENT)
Dept: GENERAL RADIOLOGY | Age: 85
End: 2020-10-09
Payer: MEDICARE

## 2020-10-09 VITALS
TEMPERATURE: 98.4 F | OXYGEN SATURATION: 99 % | SYSTOLIC BLOOD PRESSURE: 166 MMHG | WEIGHT: 146.16 LBS | RESPIRATION RATE: 14 BRPM | BODY MASS INDEX: 28.7 KG/M2 | HEART RATE: 64 BPM | DIASTOLIC BLOOD PRESSURE: 54 MMHG | HEIGHT: 60 IN

## 2020-10-09 LAB
A/G RATIO: 1.4 (ref 1.1–2.2)
ALBUMIN SERPL-MCNC: 3.8 G/DL (ref 3.4–5)
ALP BLD-CCNC: 57 U/L (ref 40–129)
ALT SERPL-CCNC: 8 U/L (ref 10–40)
ANION GAP SERPL CALCULATED.3IONS-SCNC: 10 MMOL/L (ref 3–16)
AST SERPL-CCNC: 17 U/L (ref 15–37)
BASOPHILS ABSOLUTE: 0 K/UL (ref 0–0.2)
BASOPHILS RELATIVE PERCENT: 0.6 %
BILIRUB SERPL-MCNC: 0.4 MG/DL (ref 0–1)
BILIRUBIN URINE: NEGATIVE
BLOOD, URINE: NEGATIVE
BUN BLDV-MCNC: 16 MG/DL (ref 7–20)
CALCIUM SERPL-MCNC: 9.5 MG/DL (ref 8.3–10.6)
CHLORIDE BLD-SCNC: 103 MMOL/L (ref 99–110)
CLARITY: CLEAR
CO2: 25 MMOL/L (ref 21–32)
COLOR: YELLOW
COMMENT UA: NORMAL
CREAT SERPL-MCNC: 0.6 MG/DL (ref 0.6–1.2)
EOSINOPHILS ABSOLUTE: 0.1 K/UL (ref 0–0.6)
EOSINOPHILS RELATIVE PERCENT: 1.7 %
GFR AFRICAN AMERICAN: >60
GFR NON-AFRICAN AMERICAN: >60
GLOBULIN: 2.7 G/DL
GLUCOSE BLD-MCNC: 101 MG/DL (ref 70–99)
GLUCOSE URINE: NEGATIVE MG/DL
HCT VFR BLD CALC: 39.7 % (ref 36–48)
HEMOGLOBIN: 13.3 G/DL (ref 12–16)
KETONES, URINE: NEGATIVE MG/DL
LEUKOCYTE ESTERASE, URINE: ABNORMAL
LYMPHOCYTES ABSOLUTE: 1.1 K/UL (ref 1–5.1)
LYMPHOCYTES RELATIVE PERCENT: 23.7 %
MCH RBC QN AUTO: 31.2 PG (ref 26–34)
MCHC RBC AUTO-ENTMCNC: 33.4 G/DL (ref 31–36)
MCV RBC AUTO: 93.3 FL (ref 80–100)
MICROSCOPIC EXAMINATION: YES
MONOCYTES ABSOLUTE: 0.5 K/UL (ref 0–1.3)
MONOCYTES RELATIVE PERCENT: 11.2 %
NEUTROPHILS ABSOLUTE: 2.8 K/UL (ref 1.7–7.7)
NEUTROPHILS RELATIVE PERCENT: 62.8 %
NITRITE, URINE: NEGATIVE
PDW BLD-RTO: 13 % (ref 12.4–15.4)
PH UA: 7 (ref 5–8)
PLATELET # BLD: 195 K/UL (ref 135–450)
PMV BLD AUTO: 8.5 FL (ref 5–10.5)
POTASSIUM REFLEX MAGNESIUM: 3.7 MMOL/L (ref 3.5–5.1)
PROTEIN UA: NEGATIVE MG/DL
RBC # BLD: 4.25 M/UL (ref 4–5.2)
RBC UA: NORMAL /HPF (ref 0–4)
SODIUM BLD-SCNC: 138 MMOL/L (ref 136–145)
SPECIFIC GRAVITY UA: <1.005 (ref 1–1.03)
TOTAL PROTEIN: 6.5 G/DL (ref 6.4–8.2)
TROPONIN: <0.01 NG/ML
URINE REFLEX TO CULTURE: ABNORMAL
URINE TYPE: ABNORMAL
UROBILINOGEN, URINE: 0.2 E.U./DL
WBC # BLD: 4.5 K/UL (ref 4–11)
WBC UA: NORMAL /HPF (ref 0–5)

## 2020-10-09 PROCEDURE — 85025 COMPLETE CBC W/AUTO DIFF WBC: CPT

## 2020-10-09 PROCEDURE — 93005 ELECTROCARDIOGRAM TRACING: CPT | Performed by: STUDENT IN AN ORGANIZED HEALTH CARE EDUCATION/TRAINING PROGRAM

## 2020-10-09 PROCEDURE — 2580000003 HC RX 258: Performed by: NURSE PRACTITIONER

## 2020-10-09 PROCEDURE — 99284 EMERGENCY DEPT VISIT MOD MDM: CPT

## 2020-10-09 PROCEDURE — 71045 X-RAY EXAM CHEST 1 VIEW: CPT

## 2020-10-09 PROCEDURE — 70498 CT ANGIOGRAPHY NECK: CPT

## 2020-10-09 PROCEDURE — 84484 ASSAY OF TROPONIN QUANT: CPT

## 2020-10-09 PROCEDURE — 81001 URINALYSIS AUTO W/SCOPE: CPT

## 2020-10-09 PROCEDURE — 70450 CT HEAD/BRAIN W/O DYE: CPT

## 2020-10-09 PROCEDURE — 80053 COMPREHEN METABOLIC PANEL: CPT

## 2020-10-09 PROCEDURE — 6360000004 HC RX CONTRAST MEDICATION: Performed by: NURSE PRACTITIONER

## 2020-10-09 RX ORDER — AMLODIPINE BESYLATE 5 MG/1
2.5 TABLET ORAL DAILY
Status: DISCONTINUED | OUTPATIENT
Start: 2020-10-10 | End: 2020-10-09

## 2020-10-09 RX ORDER — AMLODIPINE BESYLATE 2.5 MG/1
2.5 TABLET ORAL DAILY
Qty: 30 TABLET | Refills: 0 | Status: SHIPPED | OUTPATIENT
Start: 2020-10-09 | End: 2020-10-20 | Stop reason: SDUPTHER

## 2020-10-09 RX ORDER — 0.9 % SODIUM CHLORIDE 0.9 %
500 INTRAVENOUS SOLUTION INTRAVENOUS ONCE
Status: COMPLETED | OUTPATIENT
Start: 2020-10-09 | End: 2020-10-09

## 2020-10-09 RX ADMIN — IOPAMIDOL 75 ML: 755 INJECTION, SOLUTION INTRAVENOUS at 20:28

## 2020-10-09 RX ADMIN — SODIUM CHLORIDE 500 ML: 9 INJECTION, SOLUTION INTRAVENOUS at 20:01

## 2020-10-09 ASSESSMENT — PAIN DESCRIPTION - ORIENTATION: ORIENTATION: MID

## 2020-10-09 ASSESSMENT — PAIN DESCRIPTION - DESCRIPTORS: DESCRIPTORS: TIGHTNESS

## 2020-10-09 ASSESSMENT — PAIN SCALES - GENERAL: PAINLEVEL_OUTOF10: 7

## 2020-10-09 ASSESSMENT — PAIN DESCRIPTION - FREQUENCY: FREQUENCY: INTERMITTENT

## 2020-10-09 ASSESSMENT — PAIN DESCRIPTION - LOCATION: LOCATION: CHEST

## 2020-10-09 NOTE — ED PROVIDER NOTES
MidLevel Attestation   I havepersonally performed and/or participated in the history, exam and medical decision making and agree with all pertinent clinical information. I have also reviewed and agree with the past medical, family and social historyunless otherwise noted. I have personally performed a face to face diagnostic evaluation onthis patient. I have reviewed the mid-levels findings and agree. In brief, Jeni Galdamez is a 80 y.o. female that presented to the emergency department with complaints of elevated blood pressures as well as her vision on the left eye which is chronic. Eye exam was unremarkable including her neurological exam.  CT of the head, CTA head/neck was obtained and showed no signs of infection, bleed or infarct. Labs are also unremarkable. He was monitored in the emergency room and her blood pressures remained stable. I did contact her primary care doctor who recommended starting patient on a 2.5 mg of amlodipine. Patient remained stable and no indication for admission was discharged home to follow-up with PCP. EKG by my preliminary interpretation shows sinus rhythm with rate of 63, normal axis, normal intervals, with no ST changes indicative of ischemia at this time. But  T wave inversions in lead III which are chronic.     I have reviewed and interpreted all of the currently available lab results and diagnostics from this visit:  Results for orders placed or performed during the hospital encounter of 10/09/20   Comprehensive Metabolic Panel w/ Reflex to MG   Result Value Ref Range    Sodium 138 136 - 145 mmol/L    Potassium reflex Magnesium 3.7 3.5 - 5.1 mmol/L    Chloride 103 99 - 110 mmol/L    CO2 25 21 - 32 mmol/L    Anion Gap 10 3 - 16    Glucose 101 (H) 70 - 99 mg/dL    BUN 16 7 - 20 mg/dL    CREATININE 0.6 0.6 - 1.2 mg/dL    GFR Non-African American >60 >60    GFR African American >60 >60    Calcium 9.5 8.3 - 10.6 mg/dL    Total Protein 6.5 6.4 - 8.2 g/dL    Alb 3.8 3.4 - 5.0 g/dL    Albumin/Globulin Ratio 1.4 1.1 - 2.2    Total Bilirubin 0.4 0.0 - 1.0 mg/dL    Alkaline Phosphatase 57 40 - 129 U/L    ALT 8 (L) 10 - 40 U/L    AST 17 15 - 37 U/L    Globulin 2.7 g/dL   Troponin   Result Value Ref Range    Troponin <0.01 <0.01 ng/mL   CBC Auto Differential   Result Value Ref Range    WBC 4.5 4.0 - 11.0 K/uL    RBC 4.25 4.00 - 5.20 M/uL    Hemoglobin 13.3 12.0 - 16.0 g/dL    Hematocrit 39.7 36.0 - 48.0 %    MCV 93.3 80.0 - 100.0 fL    MCH 31.2 26.0 - 34.0 pg    MCHC 33.4 31.0 - 36.0 g/dL    RDW 13.0 12.4 - 15.4 %    Platelets 302 614 - 944 K/uL    MPV 8.5 5.0 - 10.5 fL    Neutrophils % 62.8 %    Lymphocytes % 23.7 %    Monocytes % 11.2 %    Eosinophils % 1.7 %    Basophils % 0.6 %    Neutrophils Absolute 2.8 1.7 - 7.7 K/uL    Lymphocytes Absolute 1.1 1.0 - 5.1 K/uL    Monocytes Absolute 0.5 0.0 - 1.3 K/uL    Eosinophils Absolute 0.1 0.0 - 0.6 K/uL    Basophils Absolute 0.0 0.0 - 0.2 K/uL   Urine, reflex to culture    Specimen: Urine, clean catch   Result Value Ref Range    Color, UA YELLOW Straw/Yellow    Clarity, UA Clear Clear    Glucose, Ur Negative Negative mg/dL    Bilirubin Urine Negative Negative    Ketones, Urine Negative Negative mg/dL    Specific Gravity, UA <1.005 1.005 - 1.030    Blood, Urine Negative Negative    pH, UA 7.0 5.0 - 8.0    Protein, UA Negative Negative mg/dL    Urobilinogen, Urine 0.2 <2.0 E.U./dL    Nitrite, Urine Negative Negative    Leukocyte Esterase, Urine TRACE (A) Negative    Microscopic Examination YES     Urine Type NotGiven     Urine Reflex to Culture Not Indicated    Microscopic Urinalysis   Result Value Ref Range    WBC, UA 0-2 0 - 5 /HPF    RBC, UA None seen 0 - 4 /HPF    Urinalysis Comments see below    EKG 12 Lead   Result Value Ref Range    Ventricular Rate 63 BPM    Atrial Rate 63 BPM    P-R Interval 170 ms    QRS Duration 76 ms    Q-T Interval 390 ms    QTc Calculation (Bazett) 399 ms    P Axis 36 degrees    R Axis -5 degrees    T Axis 10 degrees    Diagnosis       Normal sinus rhythmBaseline artifactAnterior infarct , age undetermined  suggestedAbnormal ECGWhen compared with ECG of 04-JUN-2020 16:35,Anterior infarct is now Present  vs lead positionNonspecific T wave abnormality now evident in Anterior leadsConfirmed by [facility] Estrellita RENO ([id number]) on 10/10/2020 11:37:46 AM     No results found. ED Medication Orders (From admission, onward)    Start Ordered     Status Ordering Provider    10/09/20 2009 10/09/20 2009  iopamidol (ISOVUE-370) 76 % injection 75 mL  IMG ONCE PRN      Last MAR action:  Given - by Myesha Berumen on 10/09/20 at 2028 [address] SIMEON    10/09/20 2000 10/09/20 1950  0.9 % sodium chloride bolus  ONCE      Last MAR action:  Stopped - by Kermit Kramer on 10/09/20 at 2141 Kle[address] SIMEON          Final Impression      1. Hypertension, unspecified type    2. Blurred vision, left eye        DISPOSITION Decision To Discharge 10/09/2020 10:14:21 PM       This record is transcribed utilizing voice recognition technology. There are inherent limitations in this technology. In addition, there may be limitations in editing of this report. If there are any discrepancies, please contact me directly.     Bobbi Ford MD   10/10/2020         Nsehniitooh Mercedes Sullivan MD  10/10/20 9461

## 2020-10-09 NOTE — ED PROVIDER NOTES
AREDS 2) CAPS Take 2 capsules by mouth daily      polyethylene glycol (GLYCOLAX) packet Take 17 g by mouth daily as needed for Constipation      Probiotic Product (ALIGN PO) Take 1 tablet by mouth daily       latanoprost (XALATAN) 0.005 % SOLN Place 1 drop into both eyes nightly          ALLERGIES    Allergies   Allergen Reactions    Ciprofloxacin Other (See Comments)     Legs tingling    Oxycodone-Acetaminophen      Sick to stomach    Percocet [Oxycodone-Acetaminophen] Nausea And Vomiting       FAMILY OR SOCIAL HISTORY    Family History   Problem Relation Age of Onset    Cancer Sister         pancreatic     Social History     Socioeconomic History    Marital status:       Spouse name: Not on file    Number of children: 2    Years of education: Not on file    Highest education level: Not on file   Occupational History    Not on file   Social Needs    Financial resource strain: Not on file    Food insecurity     Worry: Not on file     Inability: Not on file    Transportation needs     Medical: Not on file     Non-medical: Not on file   Tobacco Use    Smoking status: Never Smoker    Smokeless tobacco: Never Used   Substance and Sexual Activity    Alcohol use: No    Drug use: No    Sexual activity: Not Currently   Lifestyle    Physical activity     Days per week: Not on file     Minutes per session: Not on file    Stress: Not on file   Relationships    Social connections     Talks on phone: Not on file     Gets together: Not on file     Attends [employer] service: Not on file     Active member of club or organization: Not on file     Attends meetings of clubs or organizations: Not on file     Relationship status: Not on file    Intimate partner violence     Fear of current or ex partner: Not on file     Emotionally abused: Not on file     Physically abused: Not on file     Forced sexual activity: Not on file   Other Topics Concern    Not on file   Social History Narrative    Not on file PHYSICAL EXAM    VITAL SIGNS: BP (!) 166/54   Pulse 64   Temp 98.4 °F (36.9 °C) (Oral)   Resp 14   Ht 5' (1.524 m)   Wt 146 lb 2.6 oz (66.3 kg)   SpO2 99%   BMI 28.55 kg/m²   Constitutional:  Well developed, well nourished, no acute distress  Eyes:  Pupils equally round and reactive to light accommodation. No nystagmus. Finger-to-nose touch eye exam bilaterally without difficulty. Intact 6 cardinal fields of gaze. No gaze palsy. Chronically poor/blurred vision in the left eye. When the right eye is covered she reports seeing 2 fingers in the left eye when one finger is held up. No vision changes on the right. HENT:  External ears normal, nose normal, oropharynx moist  Neck: Normal range of motion, no tenderness  Respiratory: Lungs clear to auscultation bilaterally, no respiratory distress, no wheezing   Cardiovascular: Regular rhythm and rate, S1-S2. No murmurs. Radial, pedal posttibial pulses 2+ equal bilaterally. Brisk capillary refill to fingers and toes. Extremities are warm natural color. No peripheral edema noted. GI:  Soft, nondistended, normal bowel sounds, nontender  Musculoskeletal:  No edema, no acute deformities. 5/5 extremity strength equal bilaterally. Integument:  Skin is warm and dry, no obvious rash    Neurologic:  Awake, alert, oriented x4, forgetfulness, no aphasia, no slurred speech, CN II-XII intact, normal finger to nose test bilaterally, 5/5 strength in all 4 extremities, no arm motor drift, no leg motor drift, sensation to light touch intact bilaterally, patellar and Achilles tendon reflexes 2+ and equal bilaterally      EKG    12 lead EKG reviewed by myself and interpreted by my attending physician Dr. Jareth Gamez  Ventricular rate 63 bpm, FL interval 170 ms, QRS duration 76 ms,  ms  There is no ST elevation or depression noted. T wave inversion noted in lead III and V3. Interpretation is a normal sinus rhythm.     Today's tracing was compared to the one on [place], [address] [place] [address]   Phone [phone]   TROPONIN    Narrative:     Performed at:  [facility]  [address] [place] [facility] [address]   Phone [phone]   CBC WITH AUTO DIFFERENTIAL    Narrative:     Performed at:  [facility]  [address] [place] [address] [facility] [address]   Phone [phone]   MICROSCOPIC URINALYSIS    Narrative:     Performed at:  [facility]  [address]  [place] [facility] [address]   Phone [phone]     ED COURSE & MEDICAL DECISION MAKING    Pertinent Labs & Imaging studies reviewed and interpreted. (See chart for details)    See chart for details of medications given during the ED stay. Vitals:    10/09/20 1749 10/09/20 1915 10/09/20 2103 10/09/20 2120   BP: (!) 175/72 (!) 192/95 (!) 154/63 (!) 166/54   Pulse: 72 64     Resp: 14      Temp: 98.4 °F (36.9 °C)      TempSrc: Oral      SpO2: 96% 99%     Weight: 146 lb 2.6 oz (66.3 kg)      Height: 5' (1.524 m)        Medications   0.9 % sodium chloride bolus (0 mLs Intravenous Stopped 10/9/20 2141)   iopamidol (ISOVUE-370) 76 % injection 75 mL (75 mLs Intravenous Given 10/9/20 2028)     This patient was seen and evaluated by myself and my attending physician Dr. Kathy Alvarado. Differential diagnosis includes but is not limited to thrombotic stroke, embolic stroke, hemorrhagic stroke, TIA,  hypoglycemia, mass lesions, infection, other. She is nontoxic in appearance. She is afebrile. She is hypertensive combination with a blood pressure of 192/95. GCS is 15. She has an NIH stroke scale score of 15. CMP is unremarkable. Urinalysis is unremarkable. CBC is unremarkable. Troponin less than 0.01. Portable chest x-ray is reviewed by self and interpreted by radiology showed no acute disease. CT of the head and CTA of the head and neck as interpreted by radiology shows no acute intracranial abnormality.   Mild chronic white matter microvascular ischemic changes. No acute arterial abnormality or hemodynamically significant arterial stenosis in the head or neck. Incidental thyroid nodules measuring up to 1.1 cm in diameter likely benign. No further follow-up is warranted per guidelines below. Dr. Duane Landa spoke with Dr. Shen Hernandes regarding this patient. Through shared decision making with the patient and the daughter it was agreed that the patient could be discharged home for now with follow-up with Dr. Mian Lock. She is instructed to return to the emergency department for worsening symptoms. Patient and her daughter verbalized understanding of the discharge instructions. She was started on a low-dose of amlodipine in the meantime. Discharge blood pressure was 166/54. FINAL IMPRESSION    1. Hypertension, unspecified type    2.  Blurred vision, left eye        PLAN  Discharge with strict PCP follow-up      (Please note that this note was completed with a voice recognition program.  Every attempt was made to edit the dictations, but inevitably there remain words that are mis-transcribed.)         Hazel Hernandez, CORINNE - CNP  10/16/20 0017

## 2020-10-09 NOTE — ED NOTES
Did visual acuity with patient. Patient stood ~25ft back, was only able to read line 1 through right eye. Patient wasn't able to see anything through left eye. Patient stated her vision has gotten worse recently.      Antoine Elias  10/09/20 1924

## 2020-10-09 NOTE — ED TRIAGE NOTES
Patient came in complaining of hypertension x1 day. States she felt tired and decided to take her bp. Patient states it stayed high throughout the day and called her daughter to bring her in. bp at home was 170s but does not remember over what. States she has some blurry vision but denies headache. Hypertensive, other vss.

## 2020-10-09 NOTE — ED NOTES
Pt ambulated to restroom to provide urine specimen but pt missed hat for collection. Pt states will try to provide urine specimen again soon. Macarena HEARN notified.      Sean Frias [facility]  10/09/20 8765

## 2020-10-10 LAB
EKG ATRIAL RATE: 63 BPM
EKG DIAGNOSIS: NORMAL
EKG P AXIS: 36 DEGREES
EKG P-R INTERVAL: 170 MS
EKG Q-T INTERVAL: 390 MS
EKG QRS DURATION: 76 MS
EKG QTC CALCULATION (BAZETT): 399 MS
EKG R AXIS: -5 DEGREES
EKG T AXIS: 10 DEGREES
EKG VENTRICULAR RATE: 63 BPM

## 2020-10-10 PROCEDURE — 93010 ELECTROCARDIOGRAM REPORT: CPT | Performed by: INTERNAL MEDICINE

## 2021-02-17 ENCOUNTER — IMMUNIZATION (OUTPATIENT)
Dept: PRIMARY CARE CLINIC | Age: 86
End: 2021-02-17
Payer: MEDICARE

## 2021-02-17 PROCEDURE — 91301 COVID-19, MODERNA VACCINE 100MCG/0.5ML DOSE: CPT | Performed by: FAMILY MEDICINE

## 2021-02-17 PROCEDURE — 0011A COVID-19, MODERNA VACCINE 100MCG/0.5ML DOSE: CPT | Performed by: FAMILY MEDICINE

## 2021-03-17 ENCOUNTER — IMMUNIZATION (OUTPATIENT)
Dept: PRIMARY CARE CLINIC | Age: 86
End: 2021-03-17
Payer: MEDICARE

## 2021-03-17 PROCEDURE — 91301 COVID-19, MODERNA VACCINE 100MCG/0.5ML DOSE: CPT | Performed by: FAMILY MEDICINE

## 2021-03-17 PROCEDURE — 0012A COVID-19, MODERNA VACCINE 100MCG/0.5ML DOSE: CPT | Performed by: FAMILY MEDICINE

## 2021-05-22 ENCOUNTER — HOSPITAL ENCOUNTER (EMERGENCY)
Age: 86
Discharge: HOME | End: 2021-05-22
Attending: EMERGENCY MEDICINE
Payer: MEDICARE

## 2021-05-22 VITALS
TEMPERATURE: 98.3 F | BODY MASS INDEX: 28.05 KG/M2 | WEIGHT: 142.86 LBS | RESPIRATION RATE: 16 BRPM | HEART RATE: 72 BPM | OXYGEN SATURATION: 97 % | DIASTOLIC BLOOD PRESSURE: 76 MMHG | HEIGHT: 60 IN | SYSTOLIC BLOOD PRESSURE: 126 MMHG

## 2021-05-22 DIAGNOSIS — N94.9 VAGINAL BURNING: Primary | ICD-10-CM

## 2021-05-22 LAB
BACTERIA WET PREP: NORMAL
BILIRUBIN URINE: NEGATIVE
BLOOD, URINE: NEGATIVE
CLARITY: CLEAR
CLUE CELLS: NORMAL
COLOR: YELLOW
EPITHELIAL CELLS WET PREP: NORMAL
GLUCOSE URINE: NEGATIVE MG/DL
KETONES, URINE: NEGATIVE MG/DL
LEUKOCYTE ESTERASE, URINE: NEGATIVE
MICROSCOPIC EXAMINATION: NORMAL
NITRITE, URINE: NEGATIVE
PH UA: 7.5 (ref 5–8)
PROTEIN UA: NEGATIVE MG/DL
RBC WET PREP: NORMAL
SOURCE WET PREP: NORMAL
SPECIFIC GRAVITY UA: 1.01 (ref 1–1.03)
TRICHOMONAS PREP: NORMAL
URINE REFLEX TO CULTURE: NORMAL
URINE TYPE: NORMAL
UROBILINOGEN, URINE: 0.2 E.U./DL
WBC WET PREP: NORMAL
YEAST WET PREP: NORMAL

## 2021-05-22 PROCEDURE — 6370000000 HC RX 637 (ALT 250 FOR IP): Performed by: PHYSICIAN ASSISTANT

## 2021-05-22 PROCEDURE — 81003 URINALYSIS AUTO W/O SCOPE: CPT

## 2021-05-22 PROCEDURE — 87210 SMEAR WET MOUNT SALINE/INK: CPT

## 2021-05-22 PROCEDURE — 99284 EMERGENCY DEPT VISIT MOD MDM: CPT

## 2021-05-22 RX ORDER — ACETAMINOPHEN 500 MG
1000 TABLET ORAL ONCE
Status: COMPLETED | OUTPATIENT
Start: 2021-05-22 | End: 2021-05-22

## 2021-05-22 RX ADMIN — ACETAMINOPHEN 1000 MG: 500 TABLET ORAL at 14:47

## 2021-05-22 ASSESSMENT — PAIN DESCRIPTION - LOCATION
LOCATION: GROIN
LOCATION: VAGINA

## 2021-05-22 ASSESSMENT — PAIN DESCRIPTION - DESCRIPTORS: DESCRIPTORS: ACHING;TIGHTNESS

## 2021-05-22 ASSESSMENT — PAIN DESCRIPTION - PROGRESSION: CLINICAL_PROGRESSION: NOT CHANGED

## 2021-05-22 ASSESSMENT — PAIN SCALES - GENERAL: PAINLEVEL_OUTOF10: 5

## 2021-05-22 ASSESSMENT — PAIN DESCRIPTION - PAIN TYPE: TYPE: ACUTE PAIN

## 2021-05-22 ASSESSMENT — PAIN - FUNCTIONAL ASSESSMENT: PAIN_FUNCTIONAL_ASSESSMENT: PREVENTS OR INTERFERES SOME ACTIVE ACTIVITIES AND ADLS

## 2021-05-22 NOTE — ED PROVIDER NOTES
Attending Supervisory Note/Shared Visit   I have personally performed a face to face diagnostic evaluation on this patient. I have reviewed the mid-levels findings and agree. History and Exam by me shows complaining of intermittent vaginal burning and irritation for the last 5 years. Is been bothering her recently. She seen her family physician for it several times this month. Heart: Regular rate and rhythm. No murmurs or gallops noted. Lungs: Breath sounds equal bilaterally and clear. Abdomen: Soft, nondistended, nontender. Vaginal exam: There is no obvious rash or lesions externally. She has a small amount of clear to white discharge. Urinalysis unremarkable. Wet prep is unremarkable. Patient will be treated symptomatically and referred to gynecology for follow-up.       (Please note that portions of this note were completed with a voice recognition program.  Efforts were made to edit the dictations but occasionally words are mis-transcribed.)    Dianne Siegel MD  Attending Emergency Physician        Trudy Shin MD  05/22/21 7005

## 2021-05-22 NOTE — ED PROVIDER NOTES
[address] [facility]        Pt Name: Francisco Javier Gomez  MRN: [id number]  [place] 3/11/1929  Date of evaluation: 5/22/2021  Provider: Katherne Closs, PA-C  PCP: Adela Rdz MD  Note Started: 1:19 PM EDT        I have seen and evaluated this patient with my supervising physician Dell Perez MD.    CHIEF COMPLAINT       Chief Complaint   Patient presents with    Other     vaginal burning since yesterday, was recently on two different antibx for a UTI       HISTORY OF PRESENT ILLNESS   (Location, Timing/Onset, Context/Setting, Quality, Duration, Modifying Factors, Severity, Associated Signs and Symptoms)  Note limiting factors. Francisco Javier Gomez is a 80 y.o. female who presents with a Chief Complaint of vaginal burning that has been present for at least a month. Patient indicates that she has been to see her family doctor about 3 times this month for this issue. They did an exam as well as swabs and urine. They have put her on 2 different antibiotics but patient states that the vaginal burning has not changed or improved at all. No burning in urination at all or any, urinary association with this according to the patient. She denies any abdominal pain or acute change in urine or stools stating that she has some ongoing constipation not acutely changed at this time. No recent fevers or chills cough or congestion or difficulty eating or drinking. No extremity acute loss range of motion or strength or sensation or rash. Patient states that the burning is persistent and not activity related. She did want to see a urologist maybe 5 years ago who put her on a cream.  Patient states that she does not really remember what the symptoms were at that time or whether or not it helped the symptoms. She has not seen that urologist again more recently. No vaginal discharge or any rash or swelling noted by patient.     Nursing Notes were all reviewed and agreed with or any disagreements were addressed in the HPI. REVIEW OF SYSTEMS    (2-9 systems for level 4, 10 or more for level 5)     Review of Systems  Positive history as above with no fevers or chills cough or congestion acute fall contusion or twisting injury. No headache vision change neck pain or stiffness. No shortness of breath or chest pain or heart palpitations. No dizziness confusion syncope or near syncope. No abdominal pain or distention or increased heat or redness. Extremity swelling discoloration heat change cramping or deformity or acute loss range of motion or strength or sensation or rash. Positives and Pertinent negatives as per HPI. Except as noted above in the ROS, all other systems were reviewed and negative. PAST MEDICAL HISTORY     Past Medical History:   Diagnosis Date    Arthritis     Blood circulation, collateral     Cataract     Clostridium difficile infection 1/28/14    GERD (gastroesophageal reflux disease)     Glaucoma     Hyperlipidemia     no meds    Hypertension 2017    essential=pt denies    Macular degeneration     UTI (urinary tract infection)          SURGICAL HISTORY     Past Surgical History:   Procedure Laterality Date    CATARACT REMOVAL Bilateral 2008    COLONOSCOPY      12/17/2009    HERNIA REPAIR Left 06/08/2018    repair of ventral hernia with mesh    HERNIA REPAIR Right 6/4/2020    OPEN RIGHT INGUINAL HERNIA REPAIR WITH MESH performed by Manish Villagomez MD at [address]      rt 6/9/2009   dr Rai Escort   [facility], 1984 left.     x3         CURRENTMEDICATIONS       Previous Medications    ACETAMINOPHEN (TYLENOL) 325 MG TABLET    Take 2 tablets by mouth every 6 hours as needed for Pain    AMLODIPINE (NORVASC) 2.5 MG TABLET    Take 1 tablet by mouth daily    ASPIRIN 81 MG TABLET    Take 81 mg by mouth daily    CALCIUM CARBONATE (OSCAL) 500 MG Ear: External ear normal.      Nose: Nose normal.   Eyes:      General:         Right eye: No discharge. Left eye: No discharge. Conjunctiva/sclera: Conjunctivae normal.   Cardiovascular:      Rate and Rhythm: Normal rate and regular rhythm. Pulses: Normal pulses. Heart sounds: Normal heart sounds. No murmur heard. No gallop. Pulmonary:      Effort: Pulmonary effort is normal. No respiratory distress. Breath sounds: Normal breath sounds. No wheezing, rhonchi or rales. Abdominal:      General: Bowel sounds are normal. There is no distension. Palpations: Abdomen is soft. Tenderness: There is no abdominal tenderness. There is no right CVA tenderness or left CVA tenderness. Genitourinary:     General: Normal vulva. Exam position: Supine. Pubic Area: No rash or pubic lice. Labia:         Right: No rash, tenderness, lesion or injury. Left: No rash, tenderness, lesion or injury. Urethra: No prolapse or urethral pain. Vagina: No vaginal discharge. Cervix: Normal.      Uterus: Normal.       Adnexa:         Right: No mass, tenderness or fullness. Left: No mass, tenderness or fullness. Rectum: No external hemorrhoid. Comments: Small amount of normal-appearing mucus present, no lesions or erythema, no bulges or abnormal appearing mucosa or gross tenderness on exam.  Musculoskeletal:      Cervical back: Normal range of motion and neck supple. Skin:     General: Skin is warm and dry. Findings: No lesion or rash. Neurological:      General: No focal deficit present. Mental Status: She is alert and oriented to person, place, and time.    Psychiatric:         Mood and Affect: Mood normal.         Behavior: Behavior normal.         DIAGNOSTIC RESULTS   LABS:    Labs Reviewed   WET PREP, GENITAL    Narrative:     Performed at:  [place] [address] Phone [phone]   URINE RT REFLEX TO CULTURE    Narrative:     Performed at:  [facility]  [address] [facility] [address]   Phone [phone]       All other labs were within normal range or not returned as of this dictation. EKG: All EKG's are interpreted by the Emergency Department Physician in the absence of a cardiologist.  Please see their note for interpretation of EKG. RADIOLOGY:   Non-plain film images such as CT, Ultrasound and MRI are read by the radiologist. Plain radiographic images are visualized and preliminarily interpreted by the ED Provider with the below findings:        Interpretation per the Radiologist below, if available at the time of this note:    No orders to display     No results found. PROCEDURES   Unless otherwise noted below, none     Procedures    CRITICAL CARE TIME   N/A    CONSULTS:  None      EMERGENCY DEPARTMENT COURSE and DIFFERENTIAL DIAGNOSIS/MDM:   Vitals:    Vitals:    05/22/21 1215   BP: 134/80   Pulse: 78   Resp: 16   Temp: 98.3 °F (36.8 °C)   TempSrc: Oral   SpO2: 96%   Weight: 142 lb 13.7 oz (64.8 kg)   Height: 5' (1.524 m)       Patient was given the following medications:  Medications - No data to display      This patient presents as above and evaluation and treatment is begun. Vital signs stable for the patient. Physical exam relatively benign as above and patient appears comfortable overall, not acutely distressed or ill. Electronic medical record reviewed including the patient's few visits to the family doctor this month for similar issues and the associated testing findings and prescriptions for antibiotics. At this time urinalysis today is negative for indication of urinary tract infection and patient has no symptoms associated with urinating. Abdominal exam also benign. Wet prep swab is obtained and sent to lab. This returns as above.   At this point patient appears to be in stable condition with no acute system compromise. Her symptoms certainly may be from vaginal dryness or atrophy that is related to age. We will prescribe some estrogen cream to be used topically to see if this may help with symptoms while referring patient to GYN outpatient for further care and treatment. She verbalizes understanding and agreement with the above and the following discharge home plan. Home in good condition to drink plenty of fluids, use cream as written, and follow-up outpatient with Dr. Emerson Gruber outpatient for further care and treatment. Return to the emergency department for any emergency worsening or concern. FINAL IMPRESSION      1.  Vaginal burning          DISPOSITION/PLAN   DISPOSITION Decision To Discharge 05/22/2021 01:44:56 PM      PATIENT REFERRED TO:  Margret Chao, [address] [place] [id number] [phone]    Schedule an appointment as soon as possible for a visit         DISCHARGE MEDICATIONS:  New Prescriptions    ESTRONE 1 % CREA    Place 2 g vaginally daily       DISCONTINUED MEDICATIONS:  Discontinued Medications    No medications on file              (Please note that portions of this note were completed with a voice recognition program.  Efforts were made to edit the dictations but occasionally words are mis-transcribed.)    Katherne Closs, PA-C (electronically signed)           Katherne Closs, PA-C  05/22/21 0793

## 2021-06-14 ENCOUNTER — APPOINTMENT (OUTPATIENT)
Dept: CT IMAGING | Age: 86
End: 2021-06-14
Payer: MEDICARE

## 2021-06-14 ENCOUNTER — HOSPITAL ENCOUNTER (OUTPATIENT)
Age: 86
Setting detail: OBSERVATION
Discharge: NURSING, REHAB OR LONG-TERM CARE | End: 2021-06-22
Attending: EMERGENCY MEDICINE | Admitting: INTERNAL MEDICINE
Payer: MEDICARE

## 2021-06-14 ENCOUNTER — APPOINTMENT (OUTPATIENT)
Dept: MRI IMAGING | Age: 86
End: 2021-06-14
Payer: MEDICARE

## 2021-06-14 ENCOUNTER — APPOINTMENT (OUTPATIENT)
Dept: GENERAL RADIOLOGY | Age: 86
End: 2021-06-14
Payer: MEDICARE

## 2021-06-14 DIAGNOSIS — R42 DIZZINESS: Primary | ICD-10-CM

## 2021-06-14 DIAGNOSIS — R06.02 SHORTNESS OF BREATH: ICD-10-CM

## 2021-06-14 DIAGNOSIS — H53.8 BLURRED VISION: ICD-10-CM

## 2021-06-14 DIAGNOSIS — F41.9 ANXIETY: ICD-10-CM

## 2021-06-14 PROBLEM — M79.651 PAIN OF RIGHT THIGH: Status: ACTIVE | Noted: 2021-06-14

## 2021-06-14 LAB
A/G RATIO: 1.6 (ref 1.1–2.2)
ALBUMIN SERPL-MCNC: 4.4 G/DL (ref 3.4–5)
ALP BLD-CCNC: 63 U/L (ref 40–129)
ALT SERPL-CCNC: 8 U/L (ref 10–40)
ANION GAP SERPL CALCULATED.3IONS-SCNC: 12 MMOL/L (ref 3–16)
AST SERPL-CCNC: 17 U/L (ref 15–37)
BASOPHILS ABSOLUTE: 0 K/UL (ref 0–0.2)
BASOPHILS RELATIVE PERCENT: 0.3 %
BILIRUB SERPL-MCNC: 0.6 MG/DL (ref 0–1)
BILIRUBIN URINE: NEGATIVE
BLOOD, URINE: NEGATIVE
BUN BLDV-MCNC: 15 MG/DL (ref 7–20)
CALCIUM SERPL-MCNC: 9.5 MG/DL (ref 8.3–10.6)
CHLORIDE BLD-SCNC: 103 MMOL/L (ref 99–110)
CLARITY: CLEAR
CO2: 26 MMOL/L (ref 21–32)
COLOR: YELLOW
CREAT SERPL-MCNC: 0.7 MG/DL (ref 0.6–1.2)
EOSINOPHILS ABSOLUTE: 0 K/UL (ref 0–0.6)
EOSINOPHILS RELATIVE PERCENT: 0.7 %
GFR AFRICAN AMERICAN: >60
GFR NON-AFRICAN AMERICAN: >60
GLOBULIN: 2.8 G/DL
GLUCOSE BLD-MCNC: 99 MG/DL (ref 70–99)
GLUCOSE URINE: NEGATIVE MG/DL
HCT VFR BLD CALC: 41.6 % (ref 36–48)
HEMOGLOBIN: 14.1 G/DL (ref 12–16)
KETONES, URINE: NEGATIVE MG/DL
LACTIC ACID, SEPSIS: 0.7 MMOL/L (ref 0.4–1.9)
LACTIC ACID, SEPSIS: 0.9 MMOL/L (ref 0.4–1.9)
LEUKOCYTE ESTERASE, URINE: NEGATIVE
LYMPHOCYTES ABSOLUTE: 0.8 K/UL (ref 1–5.1)
LYMPHOCYTES RELATIVE PERCENT: 21.3 %
MCH RBC QN AUTO: 31.1 PG (ref 26–34)
MCHC RBC AUTO-ENTMCNC: 33.8 G/DL (ref 31–36)
MCV RBC AUTO: 92 FL (ref 80–100)
MICROSCOPIC EXAMINATION: NORMAL
MONOCYTES ABSOLUTE: 0.4 K/UL (ref 0–1.3)
MONOCYTES RELATIVE PERCENT: 10.6 %
NEUTROPHILS ABSOLUTE: 2.6 K/UL (ref 1.7–7.7)
NEUTROPHILS RELATIVE PERCENT: 67.1 %
NITRITE, URINE: NEGATIVE
PDW BLD-RTO: 13.3 % (ref 12.4–15.4)
PH UA: 7 (ref 5–8)
PLATELET # BLD: 203 K/UL (ref 135–450)
PMV BLD AUTO: 8.2 FL (ref 5–10.5)
POTASSIUM REFLEX MAGNESIUM: 3.7 MMOL/L (ref 3.5–5.1)
PROCALCITONIN: 0.05 NG/ML (ref 0–0.15)
PROTEIN UA: NEGATIVE MG/DL
RBC # BLD: 4.52 M/UL (ref 4–5.2)
SARS-COV-2, NAAT: NOT DETECTED
SODIUM BLD-SCNC: 141 MMOL/L (ref 136–145)
SPECIFIC GRAVITY UA: 1.01 (ref 1–1.03)
TOTAL PROTEIN: 7.2 G/DL (ref 6.4–8.2)
TROPONIN: <0.01 NG/ML
URINE REFLEX TO CULTURE: NORMAL
URINE TYPE: NORMAL
UROBILINOGEN, URINE: 0.2 E.U./DL
WBC # BLD: 3.9 K/UL (ref 4–11)

## 2021-06-14 PROCEDURE — 93005 ELECTROCARDIOGRAM TRACING: CPT | Performed by: PHYSICIAN ASSISTANT

## 2021-06-14 PROCEDURE — 94760 N-INVAS EAR/PLS OXIMETRY 1: CPT

## 2021-06-14 PROCEDURE — 85025 COMPLETE CBC W/AUTO DIFF WBC: CPT

## 2021-06-14 PROCEDURE — G0378 HOSPITAL OBSERVATION PER HR: HCPCS

## 2021-06-14 PROCEDURE — 84484 ASSAY OF TROPONIN QUANT: CPT

## 2021-06-14 PROCEDURE — 96372 THER/PROPH/DIAG INJ SC/IM: CPT

## 2021-06-14 PROCEDURE — 80053 COMPREHEN METABOLIC PANEL: CPT

## 2021-06-14 PROCEDURE — 93005 ELECTROCARDIOGRAM TRACING: CPT | Performed by: EMERGENCY MEDICINE

## 2021-06-14 PROCEDURE — 6360000002 HC RX W HCPCS: Performed by: INTERNAL MEDICINE

## 2021-06-14 PROCEDURE — 84145 PROCALCITONIN (PCT): CPT

## 2021-06-14 PROCEDURE — 93971 EXTREMITY STUDY: CPT

## 2021-06-14 PROCEDURE — 6370000000 HC RX 637 (ALT 250 FOR IP): Performed by: INTERNAL MEDICINE

## 2021-06-14 PROCEDURE — 83605 ASSAY OF LACTIC ACID: CPT

## 2021-06-14 PROCEDURE — 99285 EMERGENCY DEPT VISIT HI MDM: CPT

## 2021-06-14 PROCEDURE — 2580000003 HC RX 258: Performed by: INTERNAL MEDICINE

## 2021-06-14 PROCEDURE — 70496 CT ANGIOGRAPHY HEAD: CPT

## 2021-06-14 PROCEDURE — 36415 COLL VENOUS BLD VENIPUNCTURE: CPT

## 2021-06-14 PROCEDURE — 99220 PR INITIAL OBSERVATION CARE/DAY 70 MINUTES: CPT | Performed by: INTERNAL MEDICINE

## 2021-06-14 PROCEDURE — 87635 SARS-COV-2 COVID-19 AMP PRB: CPT

## 2021-06-14 PROCEDURE — 6370000000 HC RX 637 (ALT 250 FOR IP): Performed by: PHYSICIAN ASSISTANT

## 2021-06-14 PROCEDURE — 6360000004 HC RX CONTRAST MEDICATION: Performed by: EMERGENCY MEDICINE

## 2021-06-14 PROCEDURE — 70551 MRI BRAIN STEM W/O DYE: CPT

## 2021-06-14 PROCEDURE — 71045 X-RAY EXAM CHEST 1 VIEW: CPT

## 2021-06-14 PROCEDURE — 70450 CT HEAD/BRAIN W/O DYE: CPT

## 2021-06-14 PROCEDURE — 81003 URINALYSIS AUTO W/O SCOPE: CPT

## 2021-06-14 PROCEDURE — 87040 BLOOD CULTURE FOR BACTERIA: CPT

## 2021-06-14 RX ORDER — LATANOPROST 50 UG/ML
1 SOLUTION/ DROPS OPHTHALMIC NIGHTLY
Status: DISCONTINUED | OUTPATIENT
Start: 2021-06-14 | End: 2021-06-22 | Stop reason: HOSPADM

## 2021-06-14 RX ORDER — ACETAMINOPHEN 650 MG/1
650 SUPPOSITORY RECTAL EVERY 6 HOURS PRN
Status: DISCONTINUED | OUTPATIENT
Start: 2021-06-14 | End: 2021-06-22 | Stop reason: HOSPADM

## 2021-06-14 RX ORDER — ACETAMINOPHEN 325 MG/1
650 TABLET ORAL EVERY 6 HOURS PRN
Status: DISCONTINUED | OUTPATIENT
Start: 2021-06-14 | End: 2021-06-22 | Stop reason: HOSPADM

## 2021-06-14 RX ORDER — SODIUM CHLORIDE 0.9 % (FLUSH) 0.9 %
5-40 SYRINGE (ML) INJECTION PRN
Status: DISCONTINUED | OUTPATIENT
Start: 2021-06-14 | End: 2021-06-22 | Stop reason: HOSPADM

## 2021-06-14 RX ORDER — ACETAMINOPHEN 325 MG/1
650 TABLET ORAL ONCE
Status: COMPLETED | OUTPATIENT
Start: 2021-06-14 | End: 2021-06-14

## 2021-06-14 RX ORDER — LORAZEPAM 0.5 MG/1
0.5 TABLET ORAL EVERY 6 HOURS PRN
Status: DISCONTINUED | OUTPATIENT
Start: 2021-06-14 | End: 2021-06-18

## 2021-06-14 RX ORDER — METOPROLOL SUCCINATE 25 MG/1
25 TABLET, EXTENDED RELEASE ORAL DAILY
Status: DISCONTINUED | OUTPATIENT
Start: 2021-06-14 | End: 2021-06-22 | Stop reason: HOSPADM

## 2021-06-14 RX ORDER — ACETAMINOPHEN 325 MG/1
650 TABLET ORAL EVERY 6 HOURS PRN
Status: DISCONTINUED | OUTPATIENT
Start: 2021-06-14 | End: 2021-06-14 | Stop reason: SDUPTHER

## 2021-06-14 RX ORDER — SODIUM CHLORIDE 9 MG/ML
25 INJECTION, SOLUTION INTRAVENOUS PRN
Status: DISCONTINUED | OUTPATIENT
Start: 2021-06-14 | End: 2021-06-22 | Stop reason: HOSPADM

## 2021-06-14 RX ORDER — AMLODIPINE BESYLATE 5 MG/1
2.5 TABLET ORAL DAILY
Status: DISCONTINUED | OUTPATIENT
Start: 2021-06-14 | End: 2021-06-22 | Stop reason: HOSPADM

## 2021-06-14 RX ORDER — ONDANSETRON 2 MG/ML
4 INJECTION INTRAMUSCULAR; INTRAVENOUS EVERY 6 HOURS PRN
Status: DISCONTINUED | OUTPATIENT
Start: 2021-06-14 | End: 2021-06-22 | Stop reason: HOSPADM

## 2021-06-14 RX ORDER — PANTOPRAZOLE SODIUM 40 MG/1
40 TABLET, DELAYED RELEASE ORAL
Status: DISCONTINUED | OUTPATIENT
Start: 2021-06-15 | End: 2021-06-22 | Stop reason: HOSPADM

## 2021-06-14 RX ORDER — ANTIOX #8/OM3/DHA/EPA/LUT/ZEAX 250-2.5 MG
2 CAPSULE ORAL DAILY
Status: DISCONTINUED | OUTPATIENT
Start: 2021-06-15 | End: 2021-06-22 | Stop reason: HOSPADM

## 2021-06-14 RX ORDER — POLYETHYLENE GLYCOL 3350 17 G/17G
17 POWDER, FOR SOLUTION ORAL DAILY PRN
Status: DISCONTINUED | OUTPATIENT
Start: 2021-06-14 | End: 2021-06-22 | Stop reason: HOSPADM

## 2021-06-14 RX ORDER — ONDANSETRON 4 MG/1
4 TABLET, ORALLY DISINTEGRATING ORAL EVERY 8 HOURS PRN
Status: DISCONTINUED | OUTPATIENT
Start: 2021-06-14 | End: 2021-06-22 | Stop reason: HOSPADM

## 2021-06-14 RX ORDER — ASPIRIN 81 MG/1
81 TABLET, CHEWABLE ORAL DAILY
Status: DISCONTINUED | OUTPATIENT
Start: 2021-06-15 | End: 2021-06-22 | Stop reason: HOSPADM

## 2021-06-14 RX ORDER — POTASSIUM CHLORIDE 750 MG/1
10 TABLET, FILM COATED, EXTENDED RELEASE ORAL DAILY
Status: DISCONTINUED | OUTPATIENT
Start: 2021-06-14 | End: 2021-06-22 | Stop reason: HOSPADM

## 2021-06-14 RX ORDER — SODIUM CHLORIDE 0.9 % (FLUSH) 0.9 %
5-40 SYRINGE (ML) INJECTION EVERY 12 HOURS SCHEDULED
Status: DISCONTINUED | OUTPATIENT
Start: 2021-06-14 | End: 2021-06-22 | Stop reason: HOSPADM

## 2021-06-14 RX ORDER — ASPIRIN 81 MG/1
324 TABLET, CHEWABLE ORAL ONCE
Status: COMPLETED | OUTPATIENT
Start: 2021-06-14 | End: 2021-06-14

## 2021-06-14 RX ORDER — FUROSEMIDE 20 MG/1
20 TABLET ORAL DAILY
Status: DISCONTINUED | OUTPATIENT
Start: 2021-06-14 | End: 2021-06-22 | Stop reason: HOSPADM

## 2021-06-14 RX ADMIN — ENOXAPARIN SODIUM 30 MG: 30 INJECTION SUBCUTANEOUS at 16:24

## 2021-06-14 RX ADMIN — METOPROLOL SUCCINATE 25 MG: 25 TABLET, EXTENDED RELEASE ORAL at 16:25

## 2021-06-14 RX ADMIN — FUROSEMIDE 20 MG: 20 TABLET ORAL at 16:25

## 2021-06-14 RX ADMIN — AMLODIPINE BESYLATE 2.5 MG: 5 TABLET ORAL at 16:25

## 2021-06-14 RX ADMIN — POTASSIUM CHLORIDE 10 MEQ: 750 TABLET, FILM COATED, EXTENDED RELEASE ORAL at 16:25

## 2021-06-14 RX ADMIN — ACETAMINOPHEN 650 MG: 325 TABLET ORAL at 14:00

## 2021-06-14 RX ADMIN — IOPAMIDOL 75 ML: 755 INJECTION, SOLUTION INTRAVENOUS at 10:43

## 2021-06-14 RX ADMIN — LORAZEPAM 0.5 MG: 0.5 TABLET ORAL at 16:25

## 2021-06-14 RX ADMIN — LATANOPROST 1 DROP: 50 SOLUTION OPHTHALMIC at 21:07

## 2021-06-14 RX ADMIN — SODIUM CHLORIDE, PRESERVATIVE FREE 10 ML: 5 INJECTION INTRAVENOUS at 21:07

## 2021-06-14 RX ADMIN — ASPIRIN 81 MG 324 MG: 81 TABLET ORAL at 14:00

## 2021-06-14 ASSESSMENT — ENCOUNTER SYMPTOMS: SHORTNESS OF BREATH: 1

## 2021-06-14 ASSESSMENT — PAIN SCALES - GENERAL
PAINLEVEL_OUTOF10: 6
PAINLEVEL_OUTOF10: 8
PAINLEVEL_OUTOF10: 0
PAINLEVEL_OUTOF10: 8
PAINLEVEL_OUTOF10: 6
PAINLEVEL_OUTOF10: 0
PAINLEVEL_OUTOF10: 7
PAINLEVEL_OUTOF10: 0
PAINLEVEL_OUTOF10: 8
PAINLEVEL_OUTOF10: 7

## 2021-06-14 ASSESSMENT — PAIN DESCRIPTION - PROGRESSION
CLINICAL_PROGRESSION: GRADUALLY WORSENING
CLINICAL_PROGRESSION: GRADUALLY WORSENING

## 2021-06-14 ASSESSMENT — PAIN - FUNCTIONAL ASSESSMENT
PAIN_FUNCTIONAL_ASSESSMENT: PREVENTS OR INTERFERES SOME ACTIVE ACTIVITIES AND ADLS
PAIN_FUNCTIONAL_ASSESSMENT: PREVENTS OR INTERFERES SOME ACTIVE ACTIVITIES AND ADLS

## 2021-06-14 ASSESSMENT — PAIN DESCRIPTION - DESCRIPTORS
DESCRIPTORS: ACHING
DESCRIPTORS: ACHING

## 2021-06-14 ASSESSMENT — PAIN DESCRIPTION - FREQUENCY
FREQUENCY: CONTINUOUS
FREQUENCY: CONTINUOUS

## 2021-06-14 ASSESSMENT — PAIN DESCRIPTION - PAIN TYPE
TYPE: ACUTE PAIN
TYPE: ACUTE PAIN

## 2021-06-14 ASSESSMENT — PAIN DESCRIPTION - LOCATION
LOCATION: LEG
LOCATION: LEG

## 2021-06-14 ASSESSMENT — PAIN DESCRIPTION - ORIENTATION
ORIENTATION: RIGHT
ORIENTATION: RIGHT

## 2021-06-14 ASSESSMENT — PAIN DESCRIPTION - ONSET
ONSET: PROGRESSIVE
ONSET: SUDDEN

## 2021-06-14 NOTE — ED NOTES
Patient called out, c/o right leg thigh pain worsening, radiating to right hip. PA notified.      Paige Carrillo RN  06/14/21 0481

## 2021-06-14 NOTE — ED NOTES
Patient on call light, she reports she is unable to lift leg up do to pain. Will continue to monitor.      Edi Ribeiro RN  06/14/21 3801

## 2021-06-14 NOTE — PROGRESS NOTES
4 Eyes Skin Assessment     NAME:  Caesar Granados  YOB: 1929  MEDICAL RECORD NUMBER:  [id number]    The patient is being assess for  Admission    I agree that 2 RN's have performed a thorough Head to Toe Skin Assessment on the patient. ALL assessment sites listed below have been assessed. Areas assessed by both nurses:    Head, Face, Ears, Shoulders, Back, Chest, Arms, Elbows, Hands, Sacrum. Buttock, Coccyx, Ischium and Legs. Feet and Heels        Does the Patient have a Wound?  No noted wound(s)       Mohsen Prevention initiated:  NA   Wound Care Orders initiated:  NA    Pressure Injury (Stage 3,4, Unstageable, DTI, NWPT, and Complex wounds) if present place consult order under [de-identified] NA    New and Established Ostomies if present place consult order under : NA      Nurse 1 eSignature: Electronically signed by Mercedes Read RN on 6/14/21 at 5:00 PM EDT    **SHARE this note so that the co-signing nurse is able to place an eSignature**    Nurse 2 eSignature: Electronically signed by Lyssa Kincaid RN on 6/14/21 at 7:03 PM EDT

## 2021-06-14 NOTE — ED TRIAGE NOTES
Patient arrived via EMS. C/o waking up and not feeling well. When asked what she meant, she was unable to explain symptoms. Patient reports slight SOB. Currently 97% on room air. Patient does not appear in any respiratory distress. She reports she had sudden right thigh pain on the ride to the hospital. NIH-0. Blood glucose 100 per EMS. Alert and oriented x 4. VSS.

## 2021-06-14 NOTE — ED PROVIDER NOTES
Attending Supervisory Note/Shared Visit   I have personally performed a face to face diagnostic evaluation on this patient. I have reviewed the mid-levels findings and agree. History and Exam by me shows her white female no acute distress. She had onset of some dizzy/lightheaded sensation and blurred vision around 8:30 AM this morning. HEENT: Atraumatic. Pupils equal round reactive. Extraocular movements are intact. No nystagmus. No facial asymmetry. Heart: Regular rate and rhythm. No murmurs or gallops noted. Lungs: Breath sounds equal bilaterally and clear. Abdomen: Soft, nondistended, nontender. Neuro: Awake, alert, oriented. Symmetrical reactive pupils. Intact extraocular movements. No facial asymmetry. Symmetrical motor function. No drift. She was able to ambulate with out significant ataxia. EKG: Normal sinus rhythm, rate of 66, no acute ST-T wave changes. Age-indeterminate anterior infarct. Rhythm strip shows sinus rhythm with a rate of 66, KS interval 160 ms, QRS 74 ms with no other ectopy as interpreted by me. This compared to EKG dated 10/9/2020, no significant change noted. EKG #2: Normal sinus rhythm, rate of 65, age-indeterminate anterior inferior infarct. Rhythm strip shows sinus rhythm with a rate of 65, KS interval 170 ms, QRS 64 ms with no other ectopy as interpreted by me. No significant change compared to EKG done earlier today. Code stroke was activated. Stroke team physician was consulted. Lab reviewed. Covid negative. H&H is normal.  White blood cell count 3900. Electrolytes BUN and creatinine are normal.  Urinalysis unremarkable. Lactic acid of 0.7. Procalcitonin 0.05. MRI brain: No acute infarct. Mild volume loss. Calcified meningioma posterior medial left frontal lobe    Chest x-ray: No acute cardiopulmonary abnormalities. CT head without contrast: No acute intracranial abnormality.   Densely calcified meningioma in the left parietal convexity. CT angiogram head and neck: Unremarkable CT angiogram head and neck. Code stroke was activated. The stroke team physician resolved in the decision-making process. At this point in time no intervention with TPA or thrombectomy is indicated. She will be admitted to the hospitalist service for further evaluation and monitoring. Test results, diagnosis, and treatment plan were discussed with the patient. She understands the treatment plan and need for admission and is agreeable.     (Please note that portions of this note were completed with a voice recognition program.  Efforts were made to edit the dictations but occasionally words are mis-transcribed.)    Mike Botello MD  Attending Emergency Physician        Dolphus Mortimer, MD  06/14/21 [address] MD  06/14/21 0680

## 2021-06-14 NOTE — H&P
Admit H&P dictated--IMP; Principal Problem:    Blurred vision, bilateral--acute onset--assoc with dizziness--MRI of brain neg but definite acute sx--  Active Problems:    Dizziness--assoc with blurred vision--?? Lacunar CVA     Pain of right thigh--?? Etiology--neg doppler     Pure hypercholesterolemia    Essential hypertension--Continue current therapy    Resolved Problems:    * No resolved hospital problems.  *     Acute sx of blurry vision and dizziness--will admit --ask neuro to see and consider repeat MRI of brain   Calvin Ho MD

## 2021-06-14 NOTE — PROGRESS NOTES
Medication Reconciliation    List of medications patient is currently taking is complete. Source of information: 1. Conversation with patient at bedside                                      2. EPIC records      Allergies  Ciprofloxacin, Oxycodone-acetaminophen, and Percocet [oxycodone-acetaminophen]     Notes regarding home medications:   1. Patient did not receive any of her home medications prior to arrival to the emergency department today.     Damon Seo [facility], PharmD, BCPS  6/14/2021 11:29 AM

## 2021-06-14 NOTE — PROGRESS NOTES
Patient arrived to PCU via stretcher from the ED. Patient's daughter and son in law at bedside. Pt is awake alert and oriented able to make needs known. Pt denies pain at this time, she said her right knee  does hurt when she moves it but when she is lying still it doesn't hurt. Pt placed on telemetry monitor and verified with CM[facility]. Pt oriented to room call light and safety measures.

## 2021-06-14 NOTE — ED NOTES
Patient c/o being hungry and thirsty. Water and crackers offered to patient. Patient also voices concerns about leg pain. Says, \"I am feeling worse the longer I am here. What is taking so long\". PA notified.      Mancil Guard, RN  06/14/21 1925

## 2021-06-14 NOTE — ED NOTES
Bed: E-46  Expected date: 6/14/21  Expected time:   Means of arrival:   Comments:  79 yo F Shortness of Breath, Dizziness     Zoë March, RN  06/14/21 1000

## 2021-06-14 NOTE — ED PROVIDER NOTES
Hyperlipidemia     no meds    Hypertension 2017    essential=pt denies    Macular degeneration     UTI (urinary tract infection)        SURGICAL HISTORY           Procedure Laterality Date    CATARACT REMOVAL Bilateral 2008    COLONOSCOPY      12/17/2009    HERNIA REPAIR Left 06/08/2018    repair of ventral hernia with mesh    HERNIA REPAIR Right 6/4/2020    OPEN RIGHT INGUINAL HERNIA REPAIR WITH MESH performed by Nasima Baker MD at [address]      rt 6/9/2009   dr De La Rosa Human   [facility], 1984 left.     x3       CURRENT MEDICATIONS       Current Discharge Medication List      CONTINUE these medications which have NOT CHANGED    Details   Estrone 1 % CREA Place 2 g vaginally daily  Qty: 25 g, Refills: 0      LORazepam (ATIVAN) 0.5 MG tablet TAKE ONE TABLET BY MOUTH THREE TIMES A DAY AS NEEDED FOR ANXIETY  Qty: 90 tablet, Refills: 0    Associated Diagnoses: Anxiety      pantoprazole (PROTONIX) 40 MG tablet TAKE ONE TABLET BY MOUTH EVERY MORNING  Qty: 90 tablet, Refills: 0      furosemide (LASIX) 20 MG tablet TAKE ONE TABLET BY MOUTH EVERY MORNING  Qty: 90 tablet, Refills: 0      metoprolol succinate (TOPROL XL) 25 MG extended release tablet TAKE ONE TABLET BY MOUTH DAILY  Qty: 90 tablet, Refills: 0      potassium chloride (KLOR-CON M) 10 MEQ extended release tablet TAKE ONE TABLET BY MOUTH DAILY  Qty: 90 tablet, Refills: 2      amLODIPine (NORVASC) 2.5 MG tablet Take 1 tablet by mouth daily  Qty: 90 tablet, Refills: 3      acetaminophen (TYLENOL) 325 MG tablet Take 2 tablets by mouth every 6 hours as needed for Pain  Qty: 120 tablet, Refills: 0      ibuprofen (ADVIL;MOTRIN) 400 MG tablet Take 1 tablet by mouth 4 times daily as needed for Pain  Qty: 80 tablet, Refills: 0      calcium carbonate (OSCAL) 500 MG TABS tablet Take 1,000 mg by mouth daily      Cranberry 450 MG CAPS Take 450 mg by mouth daily       aspirin 81 MG tablet Take There is no abdominal tenderness. There is no guarding or rebound. Hernia: No hernia is present. Musculoskeletal:      Cervical back: Normal range of motion and neck supple. Comments: Mild TTP of the anterior right thigh. Compartments of the leg are soft. No right calf tenderness, erythema or edema  DP pulse 2+  Can lift the right leg off the bed but causes pain in the thigh    No TTP of the right anterior thigh with no erythema or edema    DP pulse 2+ bilaterally   Skin:     General: Skin is warm. Neurological:      Mental Status: She is alert. Motor: No weakness. Comments: Normal EOM bilaterally  Negative test of skew  No trunchal instability   Slight difficulty with finger to nose on the right  Normal finger to nose on the left. Normal rapid alternating hands bilaterally  Normal and symmetric  strength bilaterally    5/5 strength of all 4 extremities  No drift of the extremities against gravity   No facial drooping  No aphasia or dysarthria  Normal confrontation  Normal and symmetric sensation throughout   Psychiatric:         Mood and Affect: Mood normal.         Behavior: Behavior normal.         Thought Content: Thought content normal.         Judgment: Judgment normal.         DIFFERENTIAL DIAGNOSIS   CVA, TIA, electrolyte abnormality, other    DIAGNOSTICRESULTS     EKG: All EKG's are interpreted by BLAIR Selby in the absence of a cardiologist.    EKG obtained. see Dr. Benita Ramirez note for interptretation    RADIOLOGY:   Non-plain film images such as CT, Ultrasound and MRI are read by the radiologist. Jolene Wright radiographic images are visualized and preliminarily interpreted by BLAIR Selby with the below findings:      Interpretation per the Radiologist below, if available at the time of this note:    VL Extremity Venous Right         MRI BRAIN WO CONTRAST MR WITNESS   Final Result   No acute infarct. Mild volume loss.   Scattered chronic white matter changes. Incidental note made of calcified meningioma posteromedial in left frontal   lobe         XR CHEST PORTABLE   Final Result   No acute cardiopulmonary findings. CT HEAD WO CONTRAST   Final Result   No acute intracranial abnormality. Densely calcified meningioma, left parietal convexity parasagittal with mild   indentation of the adjacent sulcus. The adjacent low-density in the white   matter could be reactive but is unchanged. Findings were discussed with Darcie PINTO at 11:05 am on 6/14/2021. CTA HEAD NECK W CONTRAST   Final Result   Addendum 1 of 1   ADDENDUM:   3D reconstructed images were performed on a separate workstation and    provided   for review. Findings were discussed with Darcie PINTO at 11:16 am on    6/14/2021. Final   Unremarkable CTA of the head and neck.                   LABS:  Results for orders placed or performed during the hospital encounter of 06/14/21   COVID-19, Rapid   Result Value Ref Range    SARS-CoV-2, NAAT Not Detected Not Detected   CBC Auto Differential   Result Value Ref Range    WBC 3.9 (L) 4.0 - 11.0 K/uL    RBC 4.52 4.00 - 5.20 M/uL    Hemoglobin 14.1 12.0 - 16.0 g/dL    Hematocrit 41.6 36.0 - 48.0 %    MCV 92.0 80.0 - 100.0 fL    MCH 31.1 26.0 - 34.0 pg    MCHC 33.8 31.0 - 36.0 g/dL    RDW 13.3 12.4 - 15.4 %    Platelets 274 591 - 627 K/uL    MPV 8.2 5.0 - 10.5 fL    Neutrophils % 67.1 %    Lymphocytes % 21.3 %    Monocytes % 10.6 %    Eosinophils % 0.7 %    Basophils % 0.3 %    Neutrophils Absolute 2.6 1.7 - 7.7 K/uL    Lymphocytes Absolute 0.8 (L) 1.0 - 5.1 K/uL    Monocytes Absolute 0.4 0.0 - 1.3 K/uL    Eosinophils Absolute 0.0 0.0 - 0.6 K/uL    Basophils Absolute 0.0 0.0 - 0.2 K/uL   Comprehensive Metabolic Panel w/ Reflex to MG   Result Value Ref Range    Sodium 141 136 - 145 mmol/L    Potassium reflex Magnesium 3.7 3.5 - 5.1 mmol/L    Chloride 103 99 - 110 mmol/L    CO2 26 21 - 32 mmol/L    Anion Gap 12 3 - 16    Glucose 99 70 - 99 mg/dL    BUN 15 7 - 20 mg/dL    CREATININE 0.7 0.6 - 1.2 mg/dL    GFR Non-African American >60 >60    GFR African American >60 >60    Calcium 9.5 8.3 - 10.6 mg/dL    Total Protein 7.2 6.4 - 8.2 g/dL    Albumin 4.4 3.4 - 5.0 g/dL    Albumin/Globulin Ratio 1.6 1.1 - 2.2    Total Bilirubin 0.6 0.0 - 1.0 mg/dL    Alkaline Phosphatase 63 40 - 129 U/L    ALT 8 (L) 10 - 40 U/L    AST 17 15 - 37 U/L    Globulin 2.8 g/dL   Urine, reflex to culture    Specimen: Urine, clean catch   Result Value Ref Range    Color, UA YELLOW Straw/Yellow    Clarity, UA Clear Clear    Glucose, Ur Negative Negative mg/dL    Bilirubin Urine Negative Negative    Ketones, Urine Negative Negative mg/dL    Specific Gravity, UA 1.006 1.005 - 1.030    Blood, Urine Negative Negative    pH, UA 7.0 5.0 - 8.0    Protein, UA Negative Negative mg/dL    Urobilinogen, Urine 0.2 <2.0 E.U./dL    Nitrite, Urine Negative Negative    Leukocyte Esterase, Urine Negative Negative    Microscopic Examination Not Indicated     Urine Type NotGiven     Urine Reflex to Culture Not Indicated    Lactate, Sepsis   Result Value Ref Range    Lactic Acid, Sepsis 0.7 0.4 - 1.9 mmol/L   Lactate, Sepsis   Result Value Ref Range    Lactic Acid, Sepsis 0.9 0.4 - 1.9 mmol/L   Procalcitonin   Result Value Ref Range    Procalcitonin 0.05 0.00 - 0.15 ng/mL   Troponin   Result Value Ref Range    Troponin <0.01 <0.01 ng/mL   EKG 12 Lead   Result Value Ref Range    Ventricular Rate 66 BPM    Atrial Rate 66 BPM    P-R Interval 162 ms    QRS Duration 74 ms    Q-T Interval 378 ms    QTc Calculation (Bazett) 396 ms    P Axis 54 degrees    R Axis -7 degrees    T Axis -2 degrees    Diagnosis       Normal sinus rhythmAnterior infarct (cited on or before 09-OCT-2020)Abnormal ECGWhen compared with ECG of 09-OCT-2020 18:02,No significant change was found   EKG 12 Lead   Result Value Ref Range    Ventricular Rate 65 BPM    Atrial Rate 65 BPM    P-R Interval 172 ms    QRS Duration 64 ms    Q-T Interval 384 ms    QTc Calculation (Bazett) 399 ms    P Axis 35 degrees    R Axis -13 degrees    T Axis -9 degrees    Diagnosis       Normal sinus rhythmInferior infarct , age undeterminedAnterior infarct (cited on or before 09-OCT-2020)Abnormal ECGWhen compared with ECG of 14-JUN-2021 10:06, (unconfirmed)No significant change was found       All other labs were withinnormal range or not returned as of this dictation. EMERGENCY DEPARTMENT COURSE and DIFFERENTIAL DIAGNOSIS/MDM:   Vitals:    Vitals:    06/14/21 1415 06/14/21 1430 06/14/21 1515 06/14/21 1553   BP: 108/78 (!) 107/91 (!) 159/71    Pulse: 62 64 56    Resp: 11 13 16    Temp:   97.6 °F (36.4 °C)    TempSrc:   Oral    SpO2: 100% 100% 97% 97%   Weight:   139 lb 8.8 oz (63.3 kg)    Height:   5' (1.524 m)        Patient was nontoxic, well appearing, afebrile with normal vital signs. Saturating well on room air. She has  Dizziness and blurred vision. Code stroke called. NIHSS is 1 for slight difficulty with finger to nose on the right. I spoke with Dr. Khloe Strong of stroke team.  History is not clear cut as patient reports also \"not feeling well\" and some shortness of breath. Patient is a difficult historian. Will walk patient and update Dr. Khloe Strong. Patient was ambulated with minimal assistance from RN. Updated Dr. Khloe Strong who recommended MRI as patient is still within TPA window but history is difficult. Dr. Khloe Strong reviewed MRI. No infarct. Does not recommend TPA. Laboratory work-up was normal.  She did complain of right thigh pain family concerned for DVT so I ordered an vascular ultrasound which was negative. Unclear etiology of her dizziness and blurred vision. Consulted patient's PCP and spoke with Dr. Daily Simon who will admit the patient. Patient and family updated and in agreement with plan. CRITICAL CARE TIME   Total Critical Care time was 35 minutes, excluding separately reportable procedures.   There was a high probability of clinically significant/life threatening deterioration in the patient's condition which required my urgent intervention. Time was spent performing NIHSS, code stroke, multiple consultations with the stroke team physician, reevaluations, consultation with PCP. PROCEDURES:  None    FINAL IMPRESSION      1. Dizziness    2. Blurred vision    3. Shortness of breath          DISPOSITION/PLAN   DISPOSITION Admitted 06/14/2021 02:09:24 PM      PATIENT REFERRED TO:  No follow-up provider specified.     DISCHARGE MEDICATIONS:  Current Discharge Medication List          (Please note that portions of this note werecompleted with a voice recognition program.  Efforts were made to edit the dictations but occasionally words are mis-transcribed.)    Abdi Cruz, [facility], [address] [place] [address]  06/14/21 6727

## 2021-06-15 LAB
ANION GAP SERPL CALCULATED.3IONS-SCNC: 13 MMOL/L (ref 3–16)
BUN BLDV-MCNC: 14 MG/DL (ref 7–20)
C-REACTIVE PROTEIN: <3 MG/L (ref 0–5.1)
CALCIUM SERPL-MCNC: 9.1 MG/DL (ref 8.3–10.6)
CHLORIDE BLD-SCNC: 103 MMOL/L (ref 99–110)
CO2: 23 MMOL/L (ref 21–32)
CREAT SERPL-MCNC: 0.6 MG/DL (ref 0.6–1.2)
EKG ATRIAL RATE: 65 BPM
EKG ATRIAL RATE: 66 BPM
EKG DIAGNOSIS: NORMAL
EKG DIAGNOSIS: NORMAL
EKG P AXIS: 35 DEGREES
EKG P AXIS: 54 DEGREES
EKG P-R INTERVAL: 162 MS
EKG P-R INTERVAL: 172 MS
EKG Q-T INTERVAL: 378 MS
EKG Q-T INTERVAL: 384 MS
EKG QRS DURATION: 64 MS
EKG QRS DURATION: 74 MS
EKG QTC CALCULATION (BAZETT): 396 MS
EKG QTC CALCULATION (BAZETT): 399 MS
EKG R AXIS: -13 DEGREES
EKG R AXIS: -7 DEGREES
EKG T AXIS: -2 DEGREES
EKG T AXIS: -9 DEGREES
EKG VENTRICULAR RATE: 65 BPM
EKG VENTRICULAR RATE: 66 BPM
FOLATE: 13.05 NG/ML (ref 4.78–24.2)
GFR AFRICAN AMERICAN: >60
GFR NON-AFRICAN AMERICAN: >60
GLUCOSE BLD-MCNC: 90 MG/DL (ref 70–99)
HCT VFR BLD CALC: 38.2 % (ref 36–48)
HEMOGLOBIN: 13.4 G/DL (ref 12–16)
MCH RBC QN AUTO: 32.2 PG (ref 26–34)
MCHC RBC AUTO-ENTMCNC: 35 G/DL (ref 31–36)
MCV RBC AUTO: 92.1 FL (ref 80–100)
PDW BLD-RTO: 13.2 % (ref 12.4–15.4)
PLATELET # BLD: 191 K/UL (ref 135–450)
PMV BLD AUTO: 8.8 FL (ref 5–10.5)
POTASSIUM SERPL-SCNC: 3.8 MMOL/L (ref 3.5–5.1)
RBC # BLD: 4.15 M/UL (ref 4–5.2)
SEDIMENTATION RATE, ERYTHROCYTE: 9 MM/HR (ref 0–30)
SODIUM BLD-SCNC: 139 MMOL/L (ref 136–145)
TSH REFLEX FT4: 1.51 UIU/ML (ref 0.27–4.2)
VITAMIN B-12: 505 PG/ML (ref 211–911)
WBC # BLD: 5 K/UL (ref 4–11)

## 2021-06-15 PROCEDURE — 82607 VITAMIN B-12: CPT

## 2021-06-15 PROCEDURE — 86140 C-REACTIVE PROTEIN: CPT

## 2021-06-15 PROCEDURE — 99226 PR SBSQ OBSERVATION CARE/DAY 35 MINUTES: CPT | Performed by: INTERNAL MEDICINE

## 2021-06-15 PROCEDURE — G0378 HOSPITAL OBSERVATION PER HR: HCPCS

## 2021-06-15 PROCEDURE — 82746 ASSAY OF FOLIC ACID SERUM: CPT

## 2021-06-15 PROCEDURE — 97116 GAIT TRAINING THERAPY: CPT | Performed by: PHYSICAL THERAPIST

## 2021-06-15 PROCEDURE — 97530 THERAPEUTIC ACTIVITIES: CPT

## 2021-06-15 PROCEDURE — 6360000002 HC RX W HCPCS: Performed by: INTERNAL MEDICINE

## 2021-06-15 PROCEDURE — 80048 BASIC METABOLIC PNL TOTAL CA: CPT

## 2021-06-15 PROCEDURE — 97165 OT EVAL LOW COMPLEX 30 MIN: CPT

## 2021-06-15 PROCEDURE — 94760 N-INVAS EAR/PLS OXIMETRY 1: CPT

## 2021-06-15 PROCEDURE — 84443 ASSAY THYROID STIM HORMONE: CPT

## 2021-06-15 PROCEDURE — 6370000000 HC RX 637 (ALT 250 FOR IP): Performed by: INTERNAL MEDICINE

## 2021-06-15 PROCEDURE — 93010 ELECTROCARDIOGRAM REPORT: CPT | Performed by: INTERNAL MEDICINE

## 2021-06-15 PROCEDURE — 85027 COMPLETE CBC AUTOMATED: CPT

## 2021-06-15 PROCEDURE — 97162 PT EVAL MOD COMPLEX 30 MIN: CPT | Performed by: PHYSICAL THERAPIST

## 2021-06-15 PROCEDURE — 96372 THER/PROPH/DIAG INJ SC/IM: CPT

## 2021-06-15 PROCEDURE — 97530 THERAPEUTIC ACTIVITIES: CPT | Performed by: PHYSICAL THERAPIST

## 2021-06-15 PROCEDURE — 2580000003 HC RX 258: Performed by: INTERNAL MEDICINE

## 2021-06-15 PROCEDURE — 85652 RBC SED RATE AUTOMATED: CPT

## 2021-06-15 PROCEDURE — 36415 COLL VENOUS BLD VENIPUNCTURE: CPT

## 2021-06-15 PROCEDURE — 97535 SELF CARE MNGMENT TRAINING: CPT

## 2021-06-15 RX ORDER — ESCITALOPRAM OXALATE 10 MG/1
5 TABLET ORAL DAILY
Status: DISCONTINUED | OUTPATIENT
Start: 2021-06-15 | End: 2021-06-22 | Stop reason: HOSPADM

## 2021-06-15 RX ADMIN — ESCITALOPRAM OXALATE 5 MG: 10 TABLET ORAL at 09:24

## 2021-06-15 RX ADMIN — LORAZEPAM 0.5 MG: 0.5 TABLET ORAL at 09:33

## 2021-06-15 RX ADMIN — AMLODIPINE BESYLATE 2.5 MG: 5 TABLET ORAL at 09:33

## 2021-06-15 RX ADMIN — ENOXAPARIN SODIUM 30 MG: 30 INJECTION SUBCUTANEOUS at 09:24

## 2021-06-15 RX ADMIN — PANTOPRAZOLE SODIUM 40 MG: 40 TABLET, DELAYED RELEASE ORAL at 06:41

## 2021-06-15 RX ADMIN — METOPROLOL SUCCINATE 25 MG: 25 TABLET, EXTENDED RELEASE ORAL at 09:33

## 2021-06-15 RX ADMIN — SODIUM CHLORIDE, PRESERVATIVE FREE 10 ML: 5 INJECTION INTRAVENOUS at 21:21

## 2021-06-15 RX ADMIN — POTASSIUM CHLORIDE 10 MEQ: 750 TABLET, FILM COATED, EXTENDED RELEASE ORAL at 09:33

## 2021-06-15 RX ADMIN — SODIUM CHLORIDE, PRESERVATIVE FREE 10 ML: 5 INJECTION INTRAVENOUS at 09:41

## 2021-06-15 RX ADMIN — ASPIRIN 81 MG: 81 TABLET, CHEWABLE ORAL at 09:24

## 2021-06-15 RX ADMIN — FUROSEMIDE 20 MG: 20 TABLET ORAL at 09:24

## 2021-06-15 RX ADMIN — LATANOPROST 1 DROP: 50 SOLUTION OPHTHALMIC at 21:21

## 2021-06-15 ASSESSMENT — PAIN SCALES - GENERAL
PAINLEVEL_OUTOF10: 0
PAINLEVEL_OUTOF10: 0

## 2021-06-15 NOTE — PLAN OF CARE
Problem: Falls - Risk of:  Goal: Will remain free from falls  Description: Will remain free from falls  Outcome: Ongoing  Note: Patient is compliant with calling for assistance prior to getting up. Fall risk assessment completed every shift. All precautions in place. Pt has call light within reach at all times. Room clear of clutter. Pt aware to call for assistance when getting up. Problem: Falls - Risk of:  Goal: Absence of physical injury  Description: Absence of physical injury  Outcome: Ongoing  Note: No signs of physical injury. Problem: SAFETY  Goal: Free from accidental physical injury  Outcome: Ongoing  Note: Patient assessed for fall risk; fall precautions initiated. Patient and family instructed about safety devices. Environment kept free of clutter and adequate lighting provided. Bed locked and in lowest position. Call light within reach. Will continue to monitor. Problem: DAILY CARE  Goal: Daily care needs are met  Outcome: Ongoing  Note: Patient's ability assessed to perform self care and independent activity encouraged according to that ability. Assisted with ADL's as needed. Risk for skin breakdown assessed. Potential discharge needs assessed. Patient and family included in daily care decisions. Problem: PAIN  Goal: Patient's pain/discomfort is manageable  Outcome: Ongoing  Note: Pain/discomfort being managed with PRN analgesics per MD orders. Pt able to express presence and absence of pain and rate pain appropriately using numerical scale. Problem: SKIN INTEGRITY  Goal: Skin integrity is maintained or improved  Outcome: Ongoing  Note: Skin assessment completed every shift. Pt assessed for incontinence, appropriate barrier cream applied prn. Pt encouraged to turn/rotate every 2 hours. Assistance provided if pt unable to do so themselves.         Problem: KNOWLEDGE DEFICIT  Goal: Patient/S.O. demonstrates understanding of disease process, treatment plan, medications,

## 2021-06-15 NOTE — PROGRESS NOTES
Occupational Therapy   Occupational Therapy Initial Assessment  Date: 6/15/2021   Patient Name: Dejah Ivan  MRN: [id number]     : 3/11/1929    Date of Service: 6/15/2021    Discharge Recommendations:  24 hour supervision or assist  OT Equipment Recommendations  Equipment Needed: No    Dejah Person scored a 19/24 on the AM-PAC ADL Inpatient form. At this time, no further OT is recommended upon discharge due to pt functioning near baseline. Recommend patient returns to prior setting with prior services. Assessment   Performance deficits / Impairments: Decreased functional mobility ; Decreased endurance;Decreased ADL status; Decreased safe awareness;Decreased high-level IADLs;Decreased cognition;Decreased balance  Assessment: 81 yo female admitted  with Blurred vision- bilateral--acute onset--assoc with dizziness--MRI of brain neg, and RLE pain. PMH: R TKA, hypertension, hyperlipidemia, maculardegeneration prior history of gastroesophageal reflux and UTI. PTA, pt lives alone in a 2 level home and reports independence with ADL, IADL (dtr does grocery shopping only), and fxl mobility without AD. Today, pt funcitoning below, yet near baseline limite dby above deficits. Pt with dizziness and bilateral blurred vision throughout. WFL BUE strength and coordination. Pt required supervision bed mobility, CGA with transfers and fxl mobility within room without AD, and SBA with RW. Pt completes seated LB dressing (socks) SBA, sink side grooming SB/CGA, and anticipate CGA LB ADLs and supervision UB ADL based on balance, endurance, cognition, and BUE strength/coordination. Overall, pt with slower processing and contradicts self at times.  Cont acute OT and rec 24 hr supervision initially at d/c home  Treatment Diagnosis: impaired ADL and fxl mobility  Prognosis: Good  Decision Making: Low Complexity  OT Education: OT Role;Plan of Care;Transfer Training  REQUIRES OT FOLLOW UP: Yes  Activity Tolerance  Activity Tolerance: Patient Tolerated treatment well  Activity Tolerance: dizziness throughout. 145/80 seated EOB, 132/77 standing. dizziness worse with initial transitions  Safety Devices  Safety Devices in place: Yes  Type of devices: Nurse notified; Left in chair;Patient at risk for falls;Gait belt;Call light within reach; Chair alarm in place         Patient Diagnosis(es): The primary encounter diagnosis was Dizziness. Diagnoses of Blurred vision and Shortness of breath were also pertinent to this visit. has a past medical history of Arthritis, Blood circulation, collateral, Cataract, Clostridium difficile infection, GERD (gastroesophageal reflux disease), Glaucoma, Hyperlipidemia, Hypertension, Macular degeneration, and UTI (urinary tract infection). has a past surgical history that includes Cataract removal (Bilateral, 2008); Total knee arthroplasty; Hysterectomy (1980); Varicose vein surgery (1967 leticia, 1984 left.); Colonoscopy; hernia repair (Left, 06/08/2018); and hernia repair (Right, 6/4/2020). Treatment Diagnosis: impaired ADL and fxl mobility    Restrictions  Restrictions/Precautions  Restrictions/Precautions: Fall Risk  Position Activity Restriction  Other position/activity restrictions: telemetry    Subjective   General  Chart Reviewed: Yes  Patient assessed for rehabilitation services?: Yes  Additional Pertinent Hx: 81 yo female admitted 6/14 with Blurred vision- bilateral--acute onset--assoc with dizziness--MRI of brain neg, and RLE pain. PMH: R TKA, hypertension, hyperlipidemia, maculardegeneration prior history of gastroesophageal reflux and UTI.   Family / Caregiver Present: No  Referring Practitioner: Heather Collier MD  Diagnosis: Bilateral blurred vision  Subjective  Subjective: Pt resting supine upon arrival. Pt agreeable to OT/PT eval. Pt with reports of R thigh/hip pain off and on with session  General Comment  Comments: RN ok to see    Social/Functional History  Social/Functional History  Lives With: Alone  Type of Home: House  Home Layout: Two level, Bed/Bath upstairs, 1/2 bath on main level, Laundry in basement  Home Access: Stairs to enter with rails (rails on each side to the basement and upstairs)  Entrance Stairs - Number of Steps: 1+1  Entrance Stairs - Rails: Right  Bathroom Shower/Tub: Walk-in shower (entire step to get in)  Bathroom Toilet: Standard (vanity close by)  [employer]: Grab bars in shower, Shower chair, Grab bars around toilet  Bathroom Accessibility: Accessible  Home Equipment: Rolling walker, Cane  ADL Assistance: [address] [address] Avenue: Independent (assist for grocery shopping)  Homemaking Responsibilities: Yes  Ambulation Assistance: Independent  Transfer Assistance: Independent  Active : No (dtr)  Additional Comments: no falls       Objective   Vision: Impaired  Vision Exceptions: Wears glasses at all times (unable to read clock, difficulty reading large print text close up d/t reported blurriness- Bilaterally. Left upper half of glassess with prism)  Hearing: Within functional limits    Orientation  Overall Orientation Status: Within Normal Limits     Balance  Sitting Balance: Stand by assistance  Standing Balance: Stand by assistance (CG/SBA)  Standing Balance  Time: ~5 min  Activity: standing sink side oral care and hair brushing with CG/SBA  Functional Mobility  Functional - Mobility Device:  (no device then with RW)  Activity:  (EOB>bathroom>recliner without AD recliner><doorway with RW)  Functional Mobility Comments: EOB>bathroom>recliner without AD with CGA and min unsteadiness and guarded.  recliner><doorway with RW with close SBA  ADL  Grooming: Stand by assistance;Contact guard assistance (sink side oral care, hair brushing)  LE Dressing: Stand by assistance (seated socks)  Additional Comments: Anticipate CGA LB ADLs and supervision UB ADL based on balance, endurance, cognition, and BUE strength/coordination  Tone RUE  RUE Tone: Normotonic  Tone LUE  LUE Tone: Normotonic  Coordination  Movements Are Fluid And Coordinated: Yes     Bed mobility  Supine to Sit: Supervision  Sit to Supine: Unable to assess  Scooting: Supervision  Comment: HOB elevated  Transfers  Sit to stand: Contact guard assistance  Stand to sit: Contact guard assistance  Transfer Comments: from EOB and chair. cues for hand placement     Cognition  Overall Cognitive Status: WNL  Cognition Comment: at times slow processing and contradicting        Sensation  Overall Sensation Status:  (Numbness/tingling in B LEs distal to knee; able to feel light touch)        LUE AROM (degrees)  LUE AROM : WNL  RUE AROM (degrees)  RUE AROM : WNL  LUE Strength  Gross LUE Strength: WFL  RUE Strength  Gross RUE Strength: WFL                 Plan   Plan  Times per week: 3-5  Times per day: Daily  Current Treatment Recommendations: Strengthening, Functional Mobility Training, Patient/Caregiver Education & Training, ROM, Endurance Training, Pain Management, Balance Training, Safety Education & Training, Self-Care / ADL    AM-PAC Score        AM-[facility] Inpatient Daily Activity Raw Score: 19 (06/15/21 0847)  AM-PAC Inpatient ADL T-Scale Score : 40.22 (06/15/21 0847)  ADL Inpatient CMS 0-100% Score: 42.8 (06/15/21 08)  ADL Inpatient CMS G-Code Modifier : CK (06/15/21 0847)    Goals  Short term goals  Time Frame for Short term goals: prior to d/c  Short term goal 1: toileting SUP  Short term goal 2: ADL tx SUP  Short term goal 3: UB bathing/dresing SUP  Short term goal 4: LB dressing SUP  Short term goal 5:  Tolerate 5 min fxl standing task SUP  Patient Goals   Patient goals : to go home       Therapy Time   Individual Concurrent Group Co-treatment   Time In 0742         Time Out 0845         Minutes 63         Timed Code Treatment Minutes: 48 Minutes (15 eval)       DUANE Solomon, OTR/L

## 2021-06-15 NOTE — PLAN OF CARE
Problem: Falls - Risk of:  Goal: Will remain free from falls  Description: Will remain free from falls  Outcome: Ongoing   Fall assessment completed this shift. Fall precautions in place. Nonskid socks on feet, bed alarm active, bed in lowest position, and call light within reach. Pt educated to call for help before getting out of bed. Pt verbalized understanding. Will continue to monitor. Problem: Falls - Risk of:  Goal: Absence of physical injury  Description: Absence of physical injury  Outcome: Ongoing     Problem: SAFETY  Goal: Free from accidental physical injury  Outcome: Ongoing     Problem: DAILY CARE  Goal: Daily care needs are met  Outcome: Ongoing     Problem: PAIN  Goal: Patient's pain/discomfort is manageable  Outcome: Ongoing     Problem: SKIN INTEGRITY  Goal: Skin integrity is maintained or improved  Outcome: Ongoing     Problem: KNOWLEDGE DEFICIT  Goal: Patient/S.O. demonstrates understanding of disease process, treatment plan, medications, and discharge instructions.   Outcome: Ongoing     Problem: DISCHARGE BARRIERS  Goal: Patient's continuum of care needs are met  Outcome: Ongoing

## 2021-06-15 NOTE — PROGRESS NOTES
[facility] Progress Note  6/15/2021 8:37 AM  Subjective:   Admit Date: 6/14/2021      Chief Complaint: my legs feel numb from my knees to my feet--bilat--just since alst nite     Interval History: Stable overnite  Still c/o feels vision is off--but better   Her rt thigh pain is improved but not gone   She has multiple non specific complaints--  Await neurology consult     Diet: ADULT DIET; Regular; No Added Salt (3-4 gm)  Medications:   Scheduled Meds:   amLODIPine  2.5 mg Oral Daily    aspirin  81 mg Oral Daily    furosemide  20 mg Oral Daily    latanoprost  1 drop Both Eyes Nightly    metoprolol succinate  25 mg Oral Daily    PreserVision AREDS 2  2 capsule Oral Daily    pantoprazole  40 mg Oral QAM AC    potassium chloride  10 mEq Oral Daily    sodium chloride flush  5-40 mL Intravenous 2 times per day    enoxaparin  30 mg Subcutaneous Daily     Continuous Infusions:   sodium chloride         Review of Systems -   General ROS: afebrile  Respiratory ROS: no cough, shortness of breath or wheezing  Cardiovascular ROS: no chest pain  Musculoskeletal ROS:positive for - rt knee pain   Neurological ROS: vision is still blurry ? ? Objective:   Vitals: /76   Pulse 61   Temp 97.7 °F (36.5 °C) (Oral)   Resp 20   Ht 5' (1.524 m)   Wt 138 lb 14.2 oz (63 kg)   SpO2 96%   BMI 27.13 kg/m²   General appearance: alert and cooperative with exam  HEENT: Head: Normocephalic, no lesions, without obvious abnormality.   Neck: no adenopathy, no carotid bruit, no JVD, supple, symmetrical, trachea midline and thyroid not enlarged, symmetric, no tenderness/mass/nodules  Lungs: clear to auscultation bilaterally  Heart: regular rate and rhythm, S1, S2 normal, no murmur, click, rub or gallop  Abdomen: soft, non-tender; bowel sounds normal; no masses,  no organomegaly  Extremities: no evidence of dvt --rt knee--nl ROM   Neurologic: Mental status: nl motor strength in feet and legs --normal pulses and sensation Admission on 06/14/2021   Component Date Value Ref Range Status    Ventricular Rate 06/14/2021 66  BPM Preliminary    Atrial Rate 06/14/2021 66  BPM Preliminary    P-R Interval 06/14/2021 162  ms Preliminary    QRS Duration 06/14/2021 74  ms Preliminary    Q-T Interval 06/14/2021 378  ms Preliminary    QTc Calculation (Bazett) 06/14/2021 396  ms Preliminary    P Axis 06/14/2021 54  degrees Preliminary    R Axis 06/14/2021 -7  degrees Preliminary    T Axis 06/14/2021 -2  degrees Preliminary    Diagnosis 06/14/2021 Normal sinus rhythmAnterior infarct (cited on or before 09-OCT-2020)Abnormal ECGWhen compared with ECG of 09-OCT-2020 18:02,No significant change was found   Preliminary    WBC 06/14/2021 3.9* 4.0 - 11.0 K/uL Final    RBC 06/14/2021 4.52  4.00 - 5.20 M/uL Final    Hemoglobin 06/14/2021 14.1  12.0 - 16.0 g/dL Final    Hematocrit 06/14/2021 41.6  36.0 - 48.0 % Final    MCV 06/14/2021 92.0  80.0 - 100.0 fL Final    MCH 06/14/2021 31.1  26.0 - 34.0 pg Final    MCHC 06/14/2021 33.8  31.0 - 36.0 g/dL Final    RDW 06/14/2021 13.3  12.4 - 15.4 % Final    Platelets 67/37/1897 203  135 - 450 K/uL Final    MPV 06/14/2021 8.2  5.0 - 10.5 fL Final    Neutrophils % 06/14/2021 67.1  % Final    Lymphocytes % 06/14/2021 21.3  % Final    Monocytes % 06/14/2021 10.6  % Final    Eosinophils % 06/14/2021 0.7  % Final    Basophils % 06/14/2021 0.3  % Final    Neutrophils Absolute 06/14/2021 2.6  1.7 - 7.7 K/uL Final    Lymphocytes Absolute 06/14/2021 0.8* 1.0 - 5.1 K/uL Final    Monocytes Absolute 06/14/2021 0.4  0.0 - 1.3 K/uL Final    Eosinophils Absolute 06/14/2021 0.0  0.0 - 0.6 K/uL Final    Basophils Absolute 06/14/2021 0.0  0.0 - 0.2 K/uL Final    Sodium 06/14/2021 141  136 - 145 mmol/L Final    Potassium reflex Magnesium 06/14/2021 3.7  3.5 - 5.1 mmol/L Final    Chloride 06/14/2021 103  99 - 110 mmol/L Final    CO2 06/14/2021 26  21 - 32 mmol/L Final    Anion Gap 06/14/2021 12  3 - 16 Final    Glucose 06/14/2021 99  70 - 99 mg/dL Final    BUN 06/14/2021 15  7 - 20 mg/dL Final    CREATININE 06/14/2021 0.7  0.6 - 1.2 mg/dL Final    GFR Non- 06/14/2021 >60  >60 Final    Comment: >60 mL/min/1.73m2 EGFR, calc. for ages 25 and older using the  MDRD formula (not corrected for weight), is valid for stable  renal function.  GFR  06/14/2021 >60  >60 Final    Comment: Chronic Kidney Disease: less than 60 ml/min/1.73 sq.m. Kidney Failure: less than 15 ml/min/1.73 sq.m. Results valid for patients 18 years and older.       Calcium 06/14/2021 9.5  8.3 - 10.6 mg/dL Final    Total Protein 06/14/2021 7.2  6.4 - 8.2 g/dL Final    Albumin 06/14/2021 4.4  3.4 - 5.0 g/dL Final    Albumin/Globulin Ratio 06/14/2021 1.6  1.1 - 2.2 Final    Total Bilirubin 06/14/2021 0.6  0.0 - 1.0 mg/dL Final    Alkaline Phosphatase 06/14/2021 63  40 - 129 U/L Final    ALT 06/14/2021 8* 10 - 40 U/L Final    AST 06/14/2021 17  15 - 37 U/L Final    Globulin 06/14/2021 2.8  g/dL Final    Color, UA 06/14/2021 YELLOW  Straw/Yellow Final    Clarity, UA 06/14/2021 Clear  Clear Final    Glucose, Ur 06/14/2021 Negative  Negative mg/dL Final    Bilirubin Urine 06/14/2021 Negative  Negative Final    Ketones, Urine 06/14/2021 Negative  Negative mg/dL Final    Specific Glasgow, UA 06/14/2021 1.006  1.005 - 1.030 Final    Blood, Urine 06/14/2021 Negative  Negative Final    pH, UA 06/14/2021 7.0  5.0 - 8.0 Final    Protein, UA 06/14/2021 Negative  Negative mg/dL Final    Urobilinogen, Urine 06/14/2021 0.2  <2.0 E.U./dL Final    Nitrite, Urine 06/14/2021 Negative  Negative Final    Leukocyte Esterase, Urine 06/14/2021 Negative  Negative Final    Microscopic Examination 06/14/2021 Not Indicated   Final    Urine Type 06/14/2021 NotGiven   Final    Urine Reflex to Culture 06/14/2021 Not Indicated   Final    Lactic Acid, Sepsis 06/14/2021 0.7  0.4 - 1.9 mmol/L Final    Lactic Acid, Sepsis 06/14/2021 0.9  0.4 - 1.9 mmol/L Final    Procalcitonin 06/14/2021 0.05  0.00 - 0.15 ng/mL Final    Comment: Suspected Sepsis:  Low likelihood of sepsis  <.50 ng/mL    Increased likelihood of sepsis 0.50-2.00 ng/mL  Antibiotics encouraged    High risk of sepsis/shock   >2.00 ng/mL  Antibiotics strongly encouraged    Suspected Lower Respiratory Tract Infections:  Low likelihood of bacterial infection  <0.24 ng/mL    Increased likelihood of bacterial infection >0.24 ng/mL  Antibiotics encouraged    With successful antibiotic therapy, PCT levels should decrease  rapidly. (Half-life of 24 to 36 hours.)    Procalcitonin values from samples collected within the first  6 hours of systemic infection may still be low. Retesting may be indicated. Values from day 1 and day 4 can be entered into the Change in  Procalcitonin Calculator to determine the patient's  Mortality Risk Prognosis  (www.[facility] [place]pct-cal[facility]. [place])    In healthy neonates, plasma Procalcitonin (PCT) concentrations  increase gradually after birth, reaching peak values at about  24 hours of age then decrease to normal values below 0.5                            ng/mL  by 48-72 hours of age.  SARS-CoV-2, NAAT 06/14/2021 Not Detected  Not Detected Final    Comment: Rapid NAAT:   Negative results should be treated as presumptive and,  if inconsistent with clinical signs and symptoms or necessary for  patient management, should be tested with an alternative molecular  assay. Negative results do not preclude SARS-CoV-2 infection and  should not be used as the sole basis for patient management decisions. This test has been authorized by the FDA under an Emergency Use  Authorization (EUA) for use by authorized laboratories.     Fact sheet for Healthcare Providers:  BuildHer.es  Fact sheet for Patients: BuildHer.es    METHODOLOGY: Isothermal Nucleic Acid Amplification      Troponin 06/14/2021 <0.01  <0.01 ng/mL Final    Methodology by Troponin T    Ventricular Rate 06/14/2021 65  BPM Preliminary    Atrial Rate 06/14/2021 65  BPM Preliminary    P-R Interval 06/14/2021 172  ms Preliminary    QRS Duration 06/14/2021 64  ms Preliminary    Q-T Interval 06/14/2021 384  ms Preliminary    QTc Calculation (Bazett) 06/14/2021 399  ms Preliminary    P Axis 06/14/2021 35  degrees Preliminary    R Axis 06/14/2021 -13  degrees Preliminary    T Axis 06/14/2021 -9  degrees Preliminary    Diagnosis 06/14/2021 Normal sinus rhythmInferior infarct , age undeterminedAnterior infarct (cited on or before 09-OCT-2020)Abnormal ECGWhen compared with ECG of 14-JUN-2021 10:06, (unconfirmed)No significant change was found   Preliminary    WBC 06/15/2021 5.0  4.0 - 11.0 K/uL Final    RBC 06/15/2021 4.15  4.00 - 5.20 M/uL Final    Hemoglobin 06/15/2021 13.4  12.0 - 16.0 g/dL Final    Hematocrit 06/15/2021 38.2  36.0 - 48.0 % Final    MCV 06/15/2021 92.1  80.0 - 100.0 fL Final    MCH 06/15/2021 32.2  26.0 - 34.0 pg Final    MCHC 06/15/2021 35.0  31.0 - 36.0 g/dL Final    RDW 06/15/2021 13.2  12.4 - 15.4 % Final    Platelets 02/20/3188 191  135 - 450 K/uL Final    MPV 06/15/2021 8.8  5.0 - 10.5 fL Final    Sodium 06/15/2021 139  136 - 145 mmol/L Final    Potassium 06/15/2021 3.8  3.5 - 5.1 mmol/L Final    Chloride 06/15/2021 103  99 - 110 mmol/L Final    CO2 06/15/2021 23  21 - 32 mmol/L Final    Anion Gap 06/15/2021 13  3 - 16 Final    Glucose 06/15/2021 90  70 - 99 mg/dL Final    BUN 06/15/2021 14  7 - 20 mg/dL Final    CREATININE 06/15/2021 0.6  0.6 - 1.2 mg/dL Final    GFR Non- 06/15/2021 >60  >60 Final    Comment: >60 mL/min/1.73m2 EGFR, calc. for ages 25 and older using the  MDRD formula (not corrected for weight), is valid for stable  renal function.  GFR  06/15/2021 >60  >60 Final    Comment: Chronic Kidney Disease: less than 60 ml/min/1.73 sq.m.

## 2021-06-15 NOTE — CONSULTS
Neurology Consult Note  Reason for Consult: blurred vision, dizziness    Chief complaint: felt fuzzy, blurry vision    Dr Chandrika Chaney MD asked me to see Maureen Posadas in consultation for evaluation of blurred vision, dizziness    History of Present Illness:  Maureen Posadas is a 80 y.o. female who presents with feeling off, blurry vision. I obtained my information via interview w/ the patient, supplemented by chart review. The patient has some vision trouble at baseline, w/ a hx of glaucoma, macular degeneration, and cataract surgery. She says her L eye is more impaired than the R. She is followed by Ophthalmology. The says she woke up Monday and just felt like something was not right. She felt hazy in her head and maybe a bit dizzy. She says that is why she ended up coming to the ED to be evaluated. When she arrived here around 1000 yesterday she says she started noticing some blurry vision in both of her eyes, maybe more so in her L. A stroke alert was called. CTA head/neck was unremarkable. WAKE UP MRI brain was pursued around [facility] which was w/out any acute findings. No other acute interventions were recommended. She says that her vision has improved since yesterday, though still is not quite back to her previous baseline.       Medical History:  Past Medical History:   Diagnosis Date    Arthritis     Blood circulation, collateral     Cataract     Clostridium difficile infection 1/28/14    GERD (gastroesophageal reflux disease)     Glaucoma     Hyperlipidemia     no meds    Hypertension 2017    essential=pt denies    Macular degeneration     UTI (urinary tract infection)      Past Surgical History:   Procedure Laterality Date    CATARACT REMOVAL Bilateral 2008    COLONOSCOPY      12/17/2009    HERNIA REPAIR Left 06/08/2018    repair of ventral hernia with mesh    HERNIA REPAIR Right 6/4/2020    OPEN RIGHT INGUINAL HERNIA REPAIR WITH MESH performed by  Ciprofloxacin Other (See Comments)     Legs tingling    Oxycodone-Acetaminophen      Sick to stomach    Percocet [Oxycodone-Acetaminophen] Nausea And Vomiting     Family History   Problem Relation Age of Onset    Cancer Sister         pancreatic     Social History     Tobacco Use   Smoking Status Never Smoker   Smokeless Tobacco Never Used     Social History     Substance and Sexual Activity   Drug Use No     Social History     Substance and Sexual Activity   Alcohol Use No     ROS:  Constitutional- No weight loss or fevers  Eyes- No diplopia. No photophobia. Ears/nose/throat- No dysphagia. No Dysarthria  Cardiovascular- No palpitations. No chest pain  Respiratory- No dyspnea. No Cough  Gastrointestinal- No Abdominal pain. No Vomiting. Genitourinary- No incontinence. No urinary retention  Musculoskeletal- No myalgia. No arthralgia  Skin- No rash. No easy bruising. Psychiatric- No depression. No anxiety  Endocrine- No diabetes. No thyroid issues. Hematologic- No bleeding difficulty. No fatigue  Neurologic- No weakness. No Headache. Exam:  Blood pressure 122/78, pulse 66, temperature 97.6 °F (36.4 °C), temperature source Oral, resp. rate 18, height 5' (1.524 m), weight 138 lb 14.2 oz (63 kg), SpO2 96 %, not currently breastfeeding. Constitutional    Vital signs: BP, HR, and RR reviewed   General alert, no distress, well-nourished  Eyes: unable to visualize the fundi  Cardiovascular: no peripheral edema. Psychiatric: cooperative with examination, no psychotic behavior noted. Neurologic  Mental status:   orientation to person, place, time, situation. General fund of knowledge grossly intact   Memory grossly intact   Attention intact as able to attend well to the exam     Language fluent in conversation   Comprehension intact; follows simple commands  Cranial nerves:   CN2: visual fields grossly full. Acuity in L eye is more impaired than R.    CN 3,4,6: extraocular muscles intact.     CN5: facial sensation symmetric   CN7: face symmetric without dysarthria  CN8: hearing grossly intact  CN9: palate elevated symmetrically  CN11: trap full strength on shoulder shrug  CN12: tongue midline with protrusion  Strength: No pronator drift. Good strength in all 4 extremities. RLE is somewhat limited due to R thigh pain. Deep tendon reflexes: normal in all 4 extremities  Sensory: light touch intact in all 4 extremities. Cerebellar/coordination: finger nose finger normal without ataxia  Tone: normal in all 4 extremities  Gait: deferred at this time for safety. Labs  Glucose 90  Na 139  K 3.8  BUN 14  Cr 0.6    WBC 5.0K  Hg 13.4  Platelets 126    CRP < 3.0  ESR 9  TSH Relfex FT4 - 1.51     (Jan)    UA negative    Studies  MRI brain w/o 6/14/21, independently reviewed  1. No acute infarct.  Mild volume loss.  Scattered chronic white matter changes. 2. Incidental note made of calcified meningioma posteromedial in left frontal   lobe         CTA head/neck 6/14/21, independently reviewed  Unremarkable. EKG 6/14/21  NSR    Impression  1. Blurry vision. This has improved though is not quite back to normal.  MRI brain done relatively early was w/out any obvious diffusion restriction to suggest stroke. 2.  Macular degeneration. 3.  Glaucoma. 4.  Hypertension. 5.  Calcified frontal lobe mass, possible meningioma. Recommendations  1. Sometimes posterior circulation stroke can me missed w/ early imaging. I'd consider repeating MRI brain tomorrow to ensure no acute abnormalities. 2.  Continue antiplatelet. Consider statin given past LDL elevation. 3.  May need to have Ophthalmology see her.       Tiago Lewis NP  [address] [place] [address] [address] Neurology    A copy of this note was provided for Dr Jennifer Selby MD

## 2021-06-15 NOTE — H&P
[address] [place] [address]                              HISTORY AND PHYSICAL    PATIENT NAME: Kush Bynum                 :        1929  MED REC NO:   [id number]                          ROOM:       [id number]  ACCOUNT NO:   [de-identified]                           ADMIT DATE: 2021  PROVIDER:     Andrey Sagastume MD    CHIEF COMPLAINT:  Dizziness and vision change. HISTORY OF PRESENT ILLNESS:  The patient is a 24-year-old white female  admitted through emergency. She presented with a history of awakening  this morning and \"not feeling well. \"  When pressed for specific details,  she said that she thought her vision was blurry and this was in both  eyes and that she also was feeling slightly short of breath. She also  said when she went to get up, she felt a little bit dizzy and a little  wobbly when she was walking. There was no problem with her speech. There was no fever or chills. There was no trauma. She also told the  ER staff that her right thigh was achy for about an hour after she was  in the emergency room. She presented to the ER and had no focal  neurologic deficits. Her vision did not appear to be a difference one  side to the other and there was no appreciable motor deficit. She had  studies done including a head CT was unremarkable. Because of the  concern of vision and dizziness, stroke team was consulted. They did  not feel treatment was warranted and MRI of the brain was done which did  not show any acute lacunar infarct. The MRI of the brain was without  contrast and this also revealed scattered chronic white matter changes. She also has a calcified left meningioma of the left frontal lobe that  is chronic and unchanged. Vital signs remain stable while she was in. She also had laboratory data that was relatively unremarkable.   Her  persistence of the vision being blurred and she palpation. In the right knee, prior knee replacement site is  unremarkable. NEUROLOGIC EXAM:  She is alert, conversant, moving all extremities. There not appear to be any visual field deficit to confrontation and  there is no pronator drift on physical examination. LABORATORY DATA:  MRI of the brain was as noted without acute infarct. Her lactic acid was 0.9 which is normal.    Doppler of the lower extremities, on the right side, showed no evidence  of deep venous of superficial venous thrombosis. COVID test was negative. EKG showed normal sinus rhythm without acute findings. Troponin less  than 0.01. BUN and creatinine 15 and 0.7. White count 3900, hemoglobin  of 14. Blood sugar of 99. CT of the head was done also prior to the MRI and it showed no evidence  of acute intracranial abnormality with densely calcified meningioma of  the left parietal convexity was unchanged. She also had CTA of the head  and neck which failed to reveal any acute vascular insufficiency. A  portable x-ray of the chest showed no acute cardiopulmonary findings. IMPRESSION:  At the time of this dictation,  1. Blurred vision bilaterally, acute onset, associated with dizziness. MRI of the brain was negative but these symptoms do appear to be acute  and persisting. 2.  Dizziness. 3.  Right thigh pain. 4.  Hypercholesterolemia. 5.  Hypertension. PLAN:  She will be admitted. We will ask Neuro consult to see if there  may be an acute vascular, then I will consider repeat MRI of brain in  the morning. In the meantime, we will follow vascular checks and also  low blood pressure monitoring.         Regina Fernandez MD    D: 06/14/2021 17:00:23       T: 06/14/2021 17:07:21     [id number]  Job#: [id number]     Doc#: [id number]    CC:  Forrest Jamil MD

## 2021-06-15 NOTE — PROGRESS NOTES
Patient ambulated in the laureano with nurse and front wheeled walker. Pt tolerated well. Gait steady, no shortness of breath, no complaints of pain. Pt then assisted back to bed, bed alarm on call light within reach.

## 2021-06-15 NOTE — PROGRESS NOTES
Physical Therapy    Facility/Department: [address] PROGRESSIVE CARE  Initial Assessment    NAME: Hugo Tavarez  : 3/11/1929  MRN: [id number]    Date of Service: 6/15/2021    Discharge Recommendations:  24 hour supervision or assist, 2-3 sessions per week (during walking hours)   PT Equipment Recommendations  Equipment Needed: No  Hugo Tavarez scored a 20/24 on the AM-PAC short mobility form. Current research shows that an AM-PAC score of 18 or greater is typically associated with a discharge to the patient's home setting. Based on the patient's AM-PAC score and their current functional mobility deficits, it is recommended that the patient have 2-3 sessions per week of Physical Therapy at d/c to increase the patient's independence. At this time, this patient demonstrates the endurance and safety to discharge home with Home PT and a follow up treatment frequency of 2-3x/wk. Please see assessment section for further patient specific details. If patient discharges prior to next session this note will serve as a discharge summary. Please see below for the latest assessment towards goals. HOME HEALTH CARE: LEVEL 1 STANDARD     -Initial home health evaluation to occur within 24-48 hours, in patient home    -Home health agency to establish plan of care for patient over 60 day period    -Medication Reconciliation    -PCP Visit scheduled within seven days of discharge    -PT/OT to evaluate with goal of regaining prior level of functioning    -OT to evaluate if patient has [address] needs for personal care     If patient discharges prior to next session this note will serve as a discharge summary. Please see below for the latest assessment towards goals. Assessment   Body structures, Functions, Activity limitations: Decreased functional mobility   Assessment: pt is a 79 yo female who was adm to hosp with dizziness and stroke-like symptoms. Pt's MRI (-) for acute infarct.   Pt currently is below her baseline for functional tasks due to pain in her R thigh/groin area and tingling in B LEs distal to knee; Anticipate when pt medically able to leave the hosp she will be safe to return home with family assist and home PT. Recommend family increase assist/supervision during waking hours and pt reside on 1st floor initially until pt safe to negotiate stairs alone  Prognosis: Good  Decision Making: Medium Complexity  Clinical Presentation: evolving  PT Education: PT Role;General Safety  Barriers to Learning: slightly slowed processing  REQUIRES PT FOLLOW UP: Yes  Activity Tolerance  Activity Tolerance: Patient Tolerated treatment well  Activity Tolerance: limited slightly by dizziness; /80 in sitting and 132/77 in standing       Patient Diagnosis(es): The primary encounter diagnosis was Dizziness. Diagnoses of Blurred vision and Shortness of breath were also pertinent to this visit. has a past medical history of Arthritis, Blood circulation, collateral, Cataract, Clostridium difficile infection, GERD (gastroesophageal reflux disease), Glaucoma, Hyperlipidemia, Hypertension, Macular degeneration, and UTI (urinary tract infection). has a past surgical history that includes Cataract removal (Bilateral, 2008); Total knee arthroplasty; Hysterectomy (1980); Varicose vein surgery (1967 leticia, 1984 left.); Colonoscopy; hernia repair (Left, 06/08/2018); and hernia repair (Right, 6/4/2020). Restrictions  Restrictions/Precautions  Restrictions/Precautions: Fall Risk  Position Activity Restriction  Other position/activity restrictions: telemetry  Vision/Hearing  Vision: Impaired  Vision Exceptions: Wears glasses at all times (unable to read clock, difficulty reading large print text close up d/t reported blurriness- Bilaterally.  Left upper half of glassess with prism)  Hearing: Within functional limits     Subjective  General  Chart Reviewed: Yes  Patient assessed for rehabilitation services?: Yes  Additional Pertinent Hx: Pt is a 79 yo female adm to hosp [facility] Blurred vision, bilateral--acute onset--assoc with dizziness--MRI of brain neg but definite acute sx--Active Problems:  Dizziness--assoc with blurred vision--?? Lacunar CVA   Pain of right thigh--??  Etiology--neg doppler   Pure hypercholesterolemia  Essential hypertension--Continue current therapy  Response To Previous Treatment: Not applicable  Family / Caregiver Present: No  Referring Practitioner: Dr Unique Maldonado  Referral Date : 06/14/21  Follows Commands: Within Functional Limits  Subjective  Subjective: pt very pleasant and agreeable to working with therapy; pt reports numbness/tingling in B LEs distal to knee  Pain Screening  Patient Currently in Pain: No          Orientation  Orientation  Overall Orientation Status: Within Functional Limits  Social/Functional History  Social/Functional History  Lives With: Alone  Type of Home: House  Home Layout: Two level, Bed/Bath upstairs, 1/2 bath on main level, Laundry in basement  Home Access: Stairs to enter with rails (rails on each side to the basement and upstairs)  Entrance Stairs - Number of Steps: 1+1  Entrance Stairs - Rails: Right  Bathroom Shower/Tub: Walk-in shower (entire step to get in)  Bathroom Toilet: Standard (vanity close by)  [employer]: Grab bars in shower, Shower chair, Grab bars around toilet  Bathroom Accessibility: Accessible  Home Equipment: Rolling walker, Cane  ADL Assistance: [address] [place] [address] Avenue: Independent (assist for grocery shopping)  Homemaking Responsibilities: Yes  Ambulation Assistance: Independent  Transfer Assistance: Independent  Active : No (dtr)  Additional Comments: no falls  Cognition   Cognition  Overall Cognitive Status: WNL  Cognition Comment: at times slow processing and contradicting    Objective          AROM RLE (degrees)  RLE AROM: WFL  AROM LLE (degrees)  LLE AROM : WFL  Strength RLE  Comment: pt had decreased strength in her R 61                 SOTERO FOY, PT    Electronically signed by SOTERO FOY, PT on 6/15/2021 at 8:49 AM

## 2021-06-16 ENCOUNTER — APPOINTMENT (OUTPATIENT)
Dept: MRI IMAGING | Age: 86
End: 2021-06-16
Payer: MEDICARE

## 2021-06-16 LAB
BILIRUBIN URINE: NEGATIVE
BLOOD, URINE: NEGATIVE
CLARITY: CLEAR
COLOR: YELLOW
EPITHELIAL CELLS, UA: NORMAL /HPF (ref 0–5)
GLUCOSE URINE: NEGATIVE MG/DL
KETONES, URINE: NEGATIVE MG/DL
LEUKOCYTE ESTERASE, URINE: NEGATIVE
MICROSCOPIC EXAMINATION: NORMAL
NITRITE, URINE: NEGATIVE
PH UA: 5 (ref 5–8)
PROTEIN UA: NEGATIVE MG/DL
RBC UA: NORMAL /HPF (ref 0–4)
SPECIFIC GRAVITY UA: 1.01 (ref 1–1.03)
URINE TYPE: NORMAL
UROBILINOGEN, URINE: 0.2 E.U./DL
WBC UA: NORMAL /HPF (ref 0–5)

## 2021-06-16 PROCEDURE — 99226 PR SBSQ OBSERVATION CARE/DAY 35 MINUTES: CPT | Performed by: INTERNAL MEDICINE

## 2021-06-16 PROCEDURE — 72148 MRI LUMBAR SPINE W/O DYE: CPT

## 2021-06-16 PROCEDURE — 70551 MRI BRAIN STEM W/O DYE: CPT

## 2021-06-16 PROCEDURE — 2580000003 HC RX 258: Performed by: INTERNAL MEDICINE

## 2021-06-16 PROCEDURE — 6360000002 HC RX W HCPCS: Performed by: INTERNAL MEDICINE

## 2021-06-16 PROCEDURE — 81001 URINALYSIS AUTO W/SCOPE: CPT

## 2021-06-16 PROCEDURE — G0378 HOSPITAL OBSERVATION PER HR: HCPCS

## 2021-06-16 PROCEDURE — 6370000000 HC RX 637 (ALT 250 FOR IP): Performed by: INTERNAL MEDICINE

## 2021-06-16 PROCEDURE — 94760 N-INVAS EAR/PLS OXIMETRY 1: CPT

## 2021-06-16 PROCEDURE — 97535 SELF CARE MNGMENT TRAINING: CPT

## 2021-06-16 PROCEDURE — 97110 THERAPEUTIC EXERCISES: CPT

## 2021-06-16 PROCEDURE — 96372 THER/PROPH/DIAG INJ SC/IM: CPT

## 2021-06-16 RX ORDER — ESTRADIOL 0.1 MG/G
2 CREAM VAGINAL DAILY
COMMUNITY
Start: 2021-05-22 | End: 2021-08-17 | Stop reason: CLARIF

## 2021-06-16 RX ORDER — ESTRADIOL 0.1 MG/G
2 CREAM VAGINAL DAILY
Status: DISCONTINUED | OUTPATIENT
Start: 2021-06-16 | End: 2021-06-22 | Stop reason: HOSPADM

## 2021-06-16 RX ADMIN — PANTOPRAZOLE SODIUM 40 MG: 40 TABLET, DELAYED RELEASE ORAL at 06:30

## 2021-06-16 RX ADMIN — ESCITALOPRAM OXALATE 5 MG: 10 TABLET ORAL at 08:17

## 2021-06-16 RX ADMIN — LORAZEPAM 0.5 MG: 0.5 TABLET ORAL at 08:20

## 2021-06-16 RX ADMIN — ASPIRIN 81 MG: 81 TABLET, CHEWABLE ORAL at 08:18

## 2021-06-16 RX ADMIN — ACETAMINOPHEN 650 MG: 325 TABLET ORAL at 06:29

## 2021-06-16 RX ADMIN — FUROSEMIDE 20 MG: 20 TABLET ORAL at 08:18

## 2021-06-16 RX ADMIN — METOPROLOL SUCCINATE 25 MG: 25 TABLET, EXTENDED RELEASE ORAL at 08:18

## 2021-06-16 RX ADMIN — ESTRADIOL 2 G: 0.1 CREAM VAGINAL at 21:10

## 2021-06-16 RX ADMIN — AMLODIPINE BESYLATE 2.5 MG: 5 TABLET ORAL at 08:18

## 2021-06-16 RX ADMIN — SODIUM CHLORIDE, PRESERVATIVE FREE 10 ML: 5 INJECTION INTRAVENOUS at 08:17

## 2021-06-16 RX ADMIN — SODIUM CHLORIDE, PRESERVATIVE FREE 10 ML: 5 INJECTION INTRAVENOUS at 21:11

## 2021-06-16 RX ADMIN — POTASSIUM CHLORIDE 10 MEQ: 750 TABLET, FILM COATED, EXTENDED RELEASE ORAL at 08:17

## 2021-06-16 RX ADMIN — ENOXAPARIN SODIUM 30 MG: 30 INJECTION SUBCUTANEOUS at 08:17

## 2021-06-16 RX ADMIN — LATANOPROST 1 DROP: 50 SOLUTION OPHTHALMIC at 21:11

## 2021-06-16 ASSESSMENT — PAIN SCALES - GENERAL
PAINLEVEL_OUTOF10: 0
PAINLEVEL_OUTOF10: 7

## 2021-06-16 ASSESSMENT — PAIN DESCRIPTION - LOCATION: LOCATION: VAGINA

## 2021-06-16 ASSESSMENT — PAIN - FUNCTIONAL ASSESSMENT: PAIN_FUNCTIONAL_ASSESSMENT: PREVENTS OR INTERFERES SOME ACTIVE ACTIVITIES AND ADLS

## 2021-06-16 ASSESSMENT — PAIN DESCRIPTION - PAIN TYPE: TYPE: ACUTE PAIN

## 2021-06-16 ASSESSMENT — PAIN DESCRIPTION - ONSET: ONSET: ON-GOING

## 2021-06-16 ASSESSMENT — PAIN DESCRIPTION - DESCRIPTORS: DESCRIPTORS: BURNING;CONSTANT

## 2021-06-16 ASSESSMENT — PAIN DESCRIPTION - FREQUENCY: FREQUENCY: CONTINUOUS

## 2021-06-16 ASSESSMENT — PAIN DESCRIPTION - PROGRESSION: CLINICAL_PROGRESSION: GRADUALLY WORSENING

## 2021-06-16 NOTE — PROGRESS NOTES
with BUE exercies seated to improve endurance and prevent weakness  REQUIRES OT FOLLOW UP: Yes  Activity Tolerance  Activity Tolerance: Patient Tolerated treatment well  Activity Tolerance: little to no dizziness this session  Safety Devices  Safety Devices in place: Yes  Type of devices: Nurse notified; Left in chair;Patient at risk for falls;Gait belt;Call light within reach; Chair alarm in place       Patient Diagnosis(es): The primary encounter diagnosis was Dizziness. Diagnoses of Blurred vision and Shortness of breath were also pertinent to this visit. has a past medical history of Arthritis, Blood circulation, collateral, Cataract, Clostridium difficile infection, GERD (gastroesophageal reflux disease), Glaucoma, Hyperlipidemia, Hypertension, Macular degeneration, and UTI (urinary tract infection). has a past surgical history that includes Cataract removal (Bilateral, 2008); Total knee arthroplasty; Hysterectomy (1980); Varicose vein surgery (1967 leticia, 1984 left.); Colonoscopy; hernia repair (Left, 06/08/2018); and hernia repair (Right, 6/4/2020). Restrictions  Restrictions/Precautions  Restrictions/Precautions: Fall Risk  Position Activity Restriction  Other position/activity restrictions: telemetry    Subjective   General  Chart Reviewed: Yes  Patient assessed for rehabilitation services?: Yes  Additional Pertinent Hx: 81 yo female admitted 6/14 with Blurred vision- bilateral--acute onset--assoc with dizziness--MRI of brain neg, and RLE pain. PMH: R TKA, hypertension, hyperlipidemia, maculardegeneration prior history of gastroesophageal reflux and UTI. Response to previous treatment: Patient with no complaints from previous session  Family / Caregiver Present: No  Referring Practitioner: Sanju Kapoor MD  Diagnosis: Bilateral blurred vision  Subjective  Subjective: Pt resting supine upon arrival. Pt agreeable to OT tx.  Pt with reports of R thigh/hip pain off and on with session  General Comment  Comments: RN ok to see        Orientation  Orientation  Overall Orientation Status: Within Normal Limits    Objective    ADL  Grooming: Stand by assistance (standing oral care, face washing, hair brushing)  LE Dressing: Stand by assistance (seated socks)  Toileting: Stand by assistance (peribladder, pt manages underpants and peribladder hygiene with SBA)        Balance  Sitting Balance: Supervision  Standing Balance: Stand by assistance  Standing Balance  Time: 8 min  Activity: standing sink side oral care, face washing, and hair brushing with SBA. Pt able to reach outside MARKEL various times to sink ledge just under mirror to obtain ADL items without LOB or unsteadiness  Functional Mobility  Functional - Mobility Device: Rolling Walker  Activity:  (EOB>bathroom>recliner>bathroom>recliner)  Assist Level: Stand by assistance  Functional Mobility Comments: steady gait, no LOB, no unsteadiness  Toilet Transfers  Toilet - Technique: Ambulating (RW)  Equipment Used: Grab bars  Toilet Transfer: Stand by assistance  Bed mobility  Supine to Sit: Supervision  Sit to Supine: Unable to assess  Scooting: Supervision  Comment: HOB elevated  Transfers  Sit to stand: Stand by assistance  Stand to sit: Stand by assistance  Transfer Comments: from EOB and chair.  cues for hand placement                       Cognition  Overall Cognitive Status: WNL  Cognition Comment: at times slow processing                    Type of ROM/Therapeutic Exercise  Type of ROM/Therapeutic Exercise: AROM  Comment: BUE exerices seated to improves strength and endurance for Adl and IADL  Exercises  Scapular Protraction: 10x2  Scapular Retraction: 10x2  Shoulder Flexion: 10x2  Shoulder Extension: 10x2  Shoulder ABduction: 10x2  Shoulder ADduction: 10x2  Elbow Flexion: 10x2  Elbow Extension: 10x2  Other: forward punches                    Plan   Plan  Times per week: 3-5  Times per day: Daily  Current Treatment Recommendations: Strengthening, Functional Mobility Training, Patient/Caregiver Education & Training, ROM, Endurance Training, Pain Management, Balance Training, Safety Education & Training, Self-Care / ADL    AM-PAC Score        AM-PAC Inpatient Daily Activity Raw Score: 19 (06/16/21 0921)  AM-PAC Inpatient ADL T-Scale Score : 40.22 (06/16/21 0921)  ADL Inpatient CMS 0-100% Score: 42.8 (06/16/21 0921)  ADL Inpatient CMS G-Code Modifier : CK (06/16/21 0921)    Goals  Short term goals  Time Frame for Short term goals: prior to d/c: 6/16 progressing/continue  Short term goal 1: toileting SUP  Short term goal 2: ADL tx SUP  Short term goal 3: UB bathing/dresing SUP  Short term goal 4: LB dressing SUP  Short term goal 5:  Tolerate 5 min fxl standing task SUP  Patient Goals   Patient goals : to go home       Therapy Time   Individual Concurrent Group Co-treatment   Time In 0820         Time Out 0920         Minutes 60         Timed Code Treatment Minutes: DUANE Edwards, OTR/L

## 2021-06-16 NOTE — CARE COORDINATION
INITIAL CASE MANAGEMENT ASSESSMENT    Reviewed chart, met with patient to assess possible discharge needs. Explained Case Management role/services. Living Situation:  Lives alone in a 2 story home with bed and bath on second floor but has 1/2 bath and ability to stay on main floor if needed. Laundry is in the basement. ADLs:  Independent in all aspects of care. Daughter goes to grocery store her and picks up her medication. Does not currently use an assisted device to ambulate. DME:  Folding wheeled walker, shower stool, cane and wheelchair but it is loaned out at this time. PT/OT Recs:  PT:  Pending . OT: Dinora Herrera scored a 19/24 on the AM-PAC ADL Inpatient form. At this time, no further OT is recommended upon discharge due to pt functioning near baseline. Recommend patient returns to prior setting with prior services.          Active Services:  N/A but is interested in [place] [facility] Aging services. Patient did not want cm to make a referral at this time but did accept information sheet. Transportation:  Family or friends. Medications:  Uses [facility] on [address] in [place]. Patient denies issues with getting, taking or affording medication. PCP:  Dr. Shana Hood       HD/PD:  N/A     PLAN/COMMENTS: Goal:  Return home. Discussed possible need for home care and patient agreeable if needed, list provided. Patient tells cm she could have someone stay with for initially if needed. The Plan for Transition of Care is related to the following treatment goals: home     The Patient  was provided with a choice of provider and agrees   with the discharge plan. [x] Yes [] No    Freedom of choice list was provided with basic dialogue that supports the patient's individualized plan of care/goals, treatment preferences and shares the quality data associated with the providers.  [x] Yes [] No      SW/CM provided contact information for patient or family to call with any questions. SW/CM will follow and assist as needed.     Electronically signed by Cheri Thomas on 6/16/2021 at 4:43 PM

## 2021-06-16 NOTE — PROGRESS NOTES
status: no motor weakness     Admission on 06/14/2021   Component Date Value Ref Range Status    Ventricular Rate 06/14/2021 66  BPM Final    Atrial Rate 06/14/2021 66  BPM Final    P-R Interval 06/14/2021 162  ms Final    QRS Duration 06/14/2021 74  ms Final    Q-T Interval 06/14/2021 378  ms Final    QTc Calculation (Bazett) 06/14/2021 396  ms Final    P Axis 06/14/2021 54  degrees Final    R Axis 06/14/2021 -7  degrees Final    T Axis 06/14/2021 -2  degrees Final    Diagnosis 06/14/2021 Normal sinus rhythmLow voltage QRS in precordial leadsCannot rule out Anterior infarct (cited on or before 09-OCT-2020)Abnormal ECGWhen compared with ECG of 09-OCT-2020 18:02,Possible lead misplacement in precordial leads (V2).  Confirmed by CHASTITY RENO, Katherin Augustin ([id number]) on 6/15/2021 9:06:35 AM   Final    WBC 06/14/2021 3.9* 4.0 - 11.0 K/uL Final    RBC 06/14/2021 4.52  4.00 - 5.20 M/uL Final    Hemoglobin 06/14/2021 14.1  12.0 - 16.0 g/dL Final    Hematocrit 06/14/2021 41.6  36.0 - 48.0 % Final    MCV 06/14/2021 92.0  80.0 - 100.0 fL Final    MCH 06/14/2021 31.1  26.0 - 34.0 pg Final    MCHC 06/14/2021 33.8  31.0 - 36.0 g/dL Final    RDW 06/14/2021 13.3  12.4 - 15.4 % Final    Platelets 57/33/9011 203  135 - 450 K/uL Final    MPV 06/14/2021 8.2  5.0 - 10.5 fL Final    Neutrophils % 06/14/2021 67.1  % Final    Lymphocytes % 06/14/2021 21.3  % Final    Monocytes % 06/14/2021 10.6  % Final    Eosinophils % 06/14/2021 0.7  % Final    Basophils % 06/14/2021 0.3  % Final    Neutrophils Absolute 06/14/2021 2.6  1.7 - 7.7 K/uL Final    Lymphocytes Absolute 06/14/2021 0.8* 1.0 - 5.1 K/uL Final    Monocytes Absolute 06/14/2021 0.4  0.0 - 1.3 K/uL Final    Eosinophils Absolute 06/14/2021 0.0  0.0 - 0.6 K/uL Final    Basophils Absolute 06/14/2021 0.0  0.0 - 0.2 K/uL Final    Sodium 06/14/2021 141  136 - 145 mmol/L Final    Potassium reflex Magnesium 06/14/2021 3.7  3.5 - 5.1 mmol/L Final    Chloride 06/14/2021 103 99 - 110 mmol/L Final    CO2 06/14/2021 26  21 - 32 mmol/L Final    Anion Gap 06/14/2021 12  3 - 16 Final    Glucose 06/14/2021 99  70 - 99 mg/dL Final    BUN 06/14/2021 15  7 - 20 mg/dL Final    CREATININE 06/14/2021 0.7  0.6 - 1.2 mg/dL Final    GFR Non- 06/14/2021 >60  >60 Final    Comment: >60 mL/min/1.73m2 EGFR, calc. for ages 25 and older using the  MDRD formula (not corrected for weight), is valid for stable  renal function.  GFR  06/14/2021 >60  >60 Final    Comment: Chronic Kidney Disease: less than 60 ml/min/1.73 sq.m. Kidney Failure: less than 15 ml/min/1.73 sq.m. Results valid for patients 18 years and older.       Calcium 06/14/2021 9.5  8.3 - 10.6 mg/dL Final    Total Protein 06/14/2021 7.2  6.4 - 8.2 g/dL Final    Albumin 06/14/2021 4.4  3.4 - 5.0 g/dL Final    Albumin/Globulin Ratio 06/14/2021 1.6  1.1 - 2.2 Final    Total Bilirubin 06/14/2021 0.6  0.0 - 1.0 mg/dL Final    Alkaline Phosphatase 06/14/2021 63  40 - 129 U/L Final    ALT 06/14/2021 8* 10 - 40 U/L Final    AST 06/14/2021 17  15 - 37 U/L Final    Globulin 06/14/2021 2.8  g/dL Final    Color, UA 06/14/2021 YELLOW  Straw/Yellow Final    Clarity, UA 06/14/2021 Clear  Clear Final    Glucose, Ur 06/14/2021 Negative  Negative mg/dL Final    Bilirubin Urine 06/14/2021 Negative  Negative Final    Ketones, Urine 06/14/2021 Negative  Negative mg/dL Final    Specific Stitzer, UA 06/14/2021 1.006  1.005 - 1.030 Final    Blood, Urine 06/14/2021 Negative  Negative Final    pH, UA 06/14/2021 7.0  5.0 - 8.0 Final    Protein, UA 06/14/2021 Negative  Negative mg/dL Final    Urobilinogen, Urine 06/14/2021 0.2  <2.0 E.U./dL Final    Nitrite, Urine 06/14/2021 Negative  Negative Final    Leukocyte Esterase, Urine 06/14/2021 Negative  Negative Final    Microscopic Examination 06/14/2021 Not Indicated   Final    Urine Type 06/14/2021 NotGiven   Final    Urine Reflex to Culture 06/14/2021 Not Indicated   Final    Lactic Acid, Sepsis 06/14/2021 0.7  0.4 - 1.9 mmol/L Final    Lactic Acid, Sepsis 06/14/2021 0.9  0.4 - 1.9 mmol/L Final    Blood Culture, Routine 06/14/2021 No Growth to date. Any change in status will be called. Preliminary    Culture, Blood 2 06/14/2021 No Growth to date. Any change in status will be called. Preliminary    Procalcitonin 06/14/2021 0.05  0.00 - 0.15 ng/mL Final    Comment: Suspected Sepsis:  Low likelihood of sepsis  <.50 ng/mL    Increased likelihood of sepsis 0.50-2.00 ng/mL  Antibiotics encouraged    High risk of sepsis/shock   >2.00 ng/mL  Antibiotics strongly encouraged    Suspected Lower Respiratory Tract Infections:  Low likelihood of bacterial infection  <0.24 ng/mL    Increased likelihood of bacterial infection >0.24 ng/mL  Antibiotics encouraged    With successful antibiotic therapy, PCT levels should decrease  rapidly. (Half-life of 24 to 36 hours.)    Procalcitonin values from samples collected within the first  6 hours of systemic infection may still be low. Retesting may be indicated. Values from day 1 and day 4 can be entered into the Change in  Procalcitonin Calculator to determine the patient's  Mortality Risk Prognosis  (www.[facility]-pct-calculator. com)    In healthy neonates, plasma Procalcitonin (PCT) concentrations  increase gradually after birth, reaching peak values at about  24 hours of age then decrease to normal values below 0.5                            ng/mL  by 48-72 hours of age.  SARS-CoV-2, NAAT 06/14/2021 Not Detected  Not Detected Final    Comment: Rapid NAAT:   Negative results should be treated as presumptive and,  if inconsistent with clinical signs and symptoms or necessary for  patient management, should be tested with an alternative molecular  assay. Negative results do not preclude SARS-CoV-2 infection and  should not be used as the sole basis for patient management decisions.   This test has been authorized by the FDA under an Emergency Use  Authorization (EUA) for use by authorized laboratories.     Fact sheet for Healthcare Providers:  BuildHer.es  Fact sheet for Patients: BuildHer.es    METHODOLOGY: Isothermal Nucleic Acid Amplification      Troponin 06/14/2021 <0.01  <0.01 ng/mL Final    Methodology by Troponin T    Ventricular Rate 06/14/2021 65  BPM Final    Atrial Rate 06/14/2021 65  BPM Final    P-R Interval 06/14/2021 172  ms Final    QRS Duration 06/14/2021 64  ms Final    Q-T Interval 06/14/2021 384  ms Final    QTc Calculation (Bazett) 06/14/2021 399  ms Final    P Axis 06/14/2021 35  degrees Final    R Axis 06/14/2021 -13  degrees Final    T Axis 06/14/2021 -9  degrees Final    Diagnosis 06/14/2021 Normal sinus rhythmLow voltage QRS in precordial leadsPossible Inferior infarct , age undeterminedCannot rule out Anterior infarct (cited on or before 09-OCT-2020)Nonspecific T wave abnormalityAbnormal ECGWhen compared with ECG of 14-JUN-2021 10:06,No significant change was foundConfirmed by CHASTITY RENO, Steven Joseph ([id number]) on 6/15/2021 9:07:31 AM   Final    WBC 06/15/2021 5.0  4.0 - 11.0 K/uL Final    RBC 06/15/2021 4.15  4.00 - 5.20 M/uL Final    Hemoglobin 06/15/2021 13.4  12.0 - 16.0 g/dL Final    Hematocrit 06/15/2021 38.2  36.0 - 48.0 % Final    MCV 06/15/2021 92.1  80.0 - 100.0 fL Final    MCH 06/15/2021 32.2  26.0 - 34.0 pg Final    MCHC 06/15/2021 35.0  31.0 - 36.0 g/dL Final    RDW 06/15/2021 13.2  12.4 - 15.4 % Final    Platelets 52/37/4317 191  135 - 450 K/uL Final    MPV 06/15/2021 8.8  5.0 - 10.5 fL Final    Sodium 06/15/2021 139  136 - 145 mmol/L Final    Potassium 06/15/2021 3.8  3.5 - 5.1 mmol/L Final    Chloride 06/15/2021 103  99 - 110 mmol/L Final    CO2 06/15/2021 23  21 - 32 mmol/L Final    Anion Gap 06/15/2021 13  3 - 16 Final    Glucose 06/15/2021 90  70 - 99 mg/dL Final    BUN 06/15/2021 14  7 - 20 mg/dL Final    CREATININE 06/15/2021 0.6  0.6 - 1.2 mg/dL Final    GFR Non- 06/15/2021 >60  >60 Final    Comment: >60 mL/min/1.73m2 EGFR, calc. for ages 25 and older using the  MDRD formula (not corrected for weight), is valid for stable  renal function.  GFR  06/15/2021 >60  >60 Final    Comment: Chronic Kidney Disease: less than 60 ml/min/1.73 sq.m. Kidney Failure: less than 15 ml/min/1.73 sq.m. Results valid for patients 18 years and older.  Calcium 06/15/2021 9.1  8.3 - 10.6 mg/dL Final    Vitamin B-12 06/15/2021 505  211 - 911 pg/mL Final    Folate 06/15/2021 13.05  4.78 - 24.20 ng/mL Final    Comment: Effective 11-15-16 10:00am EST  Please note reference ranges have  changed for Folate.  TSH Reflex FT4 06/15/2021 1.51  0.27 - 4.20 uIU/mL Final    Sed Rate 06/15/2021 9  0 - 30 mm/Hr Final    CRP 06/15/2021 <3.0  0.0 - 5.1 mg/L Final    Comment: [facility]-CRP Reference range:  30D-199Y    <5.1  <30D        Not established    CRP is used in the detection and evaluation of infection,  tissue injury, inflammatory disorders, and associated  disease. Increases in CRP values are non-specific and  should not be interpreted without a complete clinical  evaluation.  reference ranges have not been  established and values should be interpreted within clinical  context and with serial measurements, if clinically  appropriate. Assessment & Plan:   Principal Problem:    Blurred vision, bilateral--hx mac degen--cannot get opth to see her here--will arrange o/p eval if sx persist   Active Problems:    Dizziness--improved     Pain of right thigh==ck MRI of l/s spine     Pure hypercholesterolemia    Essential hypertension  Resolved Problems:    * No resolved hospital problems.  *  As per neurol--ck MRI of brain and l/s spine   Cont pt/ot   L/M with kevon--NA this AM   Please note that over 35 minutes was spent in evaluating the patient, review of records and results, discussion with staff/family, etc.    Ita Bui MD

## 2021-06-16 NOTE — PLAN OF CARE
Problem: Falls - Risk of:  Goal: Will remain free from falls  Description: Will remain free from falls  6/16/2021 0321 by Mat Baeza RN  Outcome: Ongoing     Problem: Falls - Risk of:  Goal: Absence of physical injury  Description: Absence of physical injury  6/16/2021 0321 by Mat Baeza RN  Outcome: Ongoing     Problem: SAFETY  Goal: Free from accidental physical injury  6/16/2021 0321 by Mat Baeza RN  Outcome: Ongoing     Problem: DAILY CARE  Goal: Daily care needs are met  6/16/2021 0321 by Mat Baeza RN  Outcome: Ongoing     Problem: PAIN  Goal: Patient's pain/discomfort is manageable  6/16/2021 0321 by Mat Baeza RN  Outcome: Ongoing     Problem: SKIN INTEGRITY  Goal: Skin integrity is maintained or improved  6/16/2021 0321 by Mat Baeza RN  Outcome: Ongoing     Problem: KNOWLEDGE DEFICIT  Goal: Patient/S.O. demonstrates understanding of disease process, treatment plan, medications, and discharge instructions.   6/16/2021 0321 by Mat Baeza RN  Outcome: Ongoing     Problem: DISCHARGE BARRIERS  Goal: Patient's continuum of care needs are met  6/16/2021 0321 by Mat Baeza RN  Outcome: Ongoing

## 2021-06-16 NOTE — PROGRESS NOTES
Neurology Progress Note    Updates  No significant events overnight. Says her vision is about the same as yesterday; not quite back to normal.      Medical History:  Past Medical History:   Diagnosis Date    Arthritis     Blood circulation, collateral     Cataract     Clostridium difficile infection 1/28/14    GERD (gastroesophageal reflux disease)     Glaucoma     Hyperlipidemia     no meds    Hypertension 2017    essential=pt denies    Macular degeneration     UTI (urinary tract infection)      Past Surgical History:   Procedure Laterality Date    CATARACT REMOVAL Bilateral 2008    COLONOSCOPY      12/17/2009    HERNIA REPAIR Left 06/08/2018    repair of ventral hernia with mesh    HERNIA REPAIR Right 6/4/2020    OPEN RIGHT INGUINAL HERNIA REPAIR WITH MESH performed by Arti Miller MD at [address]      rt 6/9/2009   dr June Ralph   [facility], 1984 left.     x3     Current Facility-Administered Medications:   estradiol (ESTRACE) vaginal cream 2 g, 2 g, Vaginal, Daily  escitalopram (LEXAPRO) tablet 5 mg, 5 mg, Oral, Daily  amLODIPine (NORVASC) tablet 2.5 mg, 2.5 mg, Oral, Daily  aspirin chewable tablet 81 mg, 81 mg, Oral, Daily  furosemide (LASIX) tablet 20 mg, 20 mg, Oral, Daily  latanoprost (XALATAN) 0.005 % ophthalmic solution 1 drop, 1 drop, Both Eyes, Nightly  LORazepam (ATIVAN) tablet 0.5 mg, 0.5 mg, Oral, Q6H PRN  metoprolol succinate (TOPROL XL) extended release tablet 25 mg, 25 mg, Oral, Daily  PreserVision AREDS 2 CAPS 2 capsule, 2 capsule, Oral, Daily  pantoprazole (PROTONIX) tablet 40 mg, 40 mg, Oral, QAM AC  potassium chloride (KLOR-CON) extended release tablet 10 mEq, 10 mEq, Oral, Daily  sodium chloride flush 0.9 % injection 5-40 mL, 5-40 mL, Intravenous, 2 times per day  sodium chloride flush 0.9 % injection 5-40 mL, 5-40 mL, Intravenous, PRN  0.9 % sodium chloride infusion, 25 mL, Intravenous, fevers  Eyes- No diplopia. No photophobia. Ears/nose/throat- No dysphagia. No Dysarthria  Cardiovascular- No palpitations. No chest pain  Respiratory- No dyspnea. No Cough  Gastrointestinal- No Abdominal pain. No Vomiting. Genitourinary- No incontinence. No urinary retention  Musculoskeletal- No myalgia. No arthralgia  Skin- No rash. No easy bruising. Psychiatric- No depression. No anxiety  Endocrine- No diabetes. No thyroid issues. Hematologic- No bleeding difficulty. No fatigue  Neurologic- No weakness. No Headache. Exam:  Blood pressure (!) 142/84, pulse 55, temperature 97.5 °F (36.4 °C), temperature source Oral, resp. rate 18, height 5' (1.524 m), weight 139 lb 8.8 oz (63.3 kg), SpO2 96 %, not currently breastfeeding. Constitutional    Vital signs: BP, HR, and RR reviewed   General alert, no distress  Eyes: unable to visualize the fundi  Cardiovascular: no peripheral edema. Psychiatric: cooperative with examination, no psychotic behavior noted. Neurologic  Mental status:   orientation to person, place   Attention intact as able to attend well to the exam     Language fluent in conversation   Comprehension intact; follows simple commands  Cranial nerves:   CN2: visual fields grossly full. Acuity in L eye is more impaired than R.    CN 3,4,6: extraocular muscles intact. CN7: face symmetric without dysarthria  CN8: hearing grossly intact  CN12: tongue midline with protrusion  Strength: No pronator drift. Good strength in BUE. Mild LLE weakness noted. RLE is somewhat limited to thigh pain. Deep tendon reflexes: normal in all 4 extremities  Sensory: light touch intact in all 4 extremities. Cerebellar/coordination: finger nose finger normal without ataxia  Tone: normal in all 4 extremities  Gait: deferred at this time for safety.       Labs  CRP < 3.0  ESR 9  TSH Relfex FT4 - 1.51    Folate 13.05  B12 - 505     (Bebo)    UA negative  Blood cultures NG    Studies  Follow up MRI brain w/o 6/16/21, independently reviewed  1. Stable MRI of the brain compared to 04/16/2021.  No acute infarction. 2. Calcified extra-axial mass overlying the left frontal lobe superiorly near   the vertex most compatible with meningioma.  Mild mass effect and vasogenic   edema along the subjacent left frontal lobe. 3. Parenchymal volume loss and sequela of mild chronic microvascular changes. MRI brain w/o 6/14/21  1. No acute infarct.  Mild volume loss.  Scattered chronic white matter changes. 2. Incidental note made of calcified meningioma posteromedial in left frontal   lobe         MRI lumbar spine w/o 6/16/21, independently reviewed  1. Chronic degenerative changes lumbar spine with neural foraminal effacement   lateral recess effacement at L2-3 L3-4 L4-5 secondary to minimal disc bulge   with facet joint hypertrophy and ligamentum flavum buckling. 2. No evidence for marrow edema no acute fracture     CTA head/neck 6/14/21  Unremarkable. EKG 6/14/21  NSR    Impression  1. Blurry vision, ongoing. This has improved though is not quite back to normal.  Follow up MRI brain done today was w/out any evidence of acute brain ischemia. 2.  Leg weakness/pain. MRI lumbar spine as above. 3.  Macular degeneration. 4.  Glaucoma. 5.  Calcified frontal lobe mass, possible meningioma. Recommendations  1. Ophthalmology evaluation. 2.  Continue rehabilitation efforts. Symptomatic management of leg discomfort. Nothing else to add from Neurology perspective at this time. Please call back w/ any additional questions or concerns. Thank you.      Nella Sicard NP  [address] [place] [address] [address] Neurology    A copy of this note was provided for Dr Freddie Gill MD

## 2021-06-16 NOTE — PLAN OF CARE
Problem: Falls - Risk of:  Goal: Will remain free from falls  Description: Will remain free from falls  6/16/2021 1712 by Patel Carter RN  Outcome: Ongoing     Problem: Falls - Risk of:  Goal: Absence of physical injury  Description: Absence of physical injury  6/16/2021 1712 by Patel Carter RN  Outcome: Ongoing

## 2021-06-16 NOTE — PROGRESS NOTES
Physical Therapy  [facility]  [id number]  [id number]    Attempted to see for PT session however pt declined; she reported she was out of the room for about 40 min for an MRI and now she is too tired.   Will continue to follow and attempt again tomorrow  [facility], [place], [address]

## 2021-06-17 PROCEDURE — 97535 SELF CARE MNGMENT TRAINING: CPT

## 2021-06-17 PROCEDURE — 99226 PR SBSQ OBSERVATION CARE/DAY 35 MINUTES: CPT | Performed by: INTERNAL MEDICINE

## 2021-06-17 PROCEDURE — G0378 HOSPITAL OBSERVATION PER HR: HCPCS

## 2021-06-17 PROCEDURE — 97530 THERAPEUTIC ACTIVITIES: CPT

## 2021-06-17 PROCEDURE — 94760 N-INVAS EAR/PLS OXIMETRY 1: CPT

## 2021-06-17 PROCEDURE — 6360000002 HC RX W HCPCS: Performed by: INTERNAL MEDICINE

## 2021-06-17 PROCEDURE — 6370000000 HC RX 637 (ALT 250 FOR IP): Performed by: INTERNAL MEDICINE

## 2021-06-17 PROCEDURE — 97116 GAIT TRAINING THERAPY: CPT | Performed by: PHYSICAL THERAPIST

## 2021-06-17 PROCEDURE — 96372 THER/PROPH/DIAG INJ SC/IM: CPT

## 2021-06-17 PROCEDURE — 2580000003 HC RX 258: Performed by: INTERNAL MEDICINE

## 2021-06-17 PROCEDURE — 97530 THERAPEUTIC ACTIVITIES: CPT | Performed by: PHYSICAL THERAPIST

## 2021-06-17 RX ADMIN — AMLODIPINE BESYLATE 2.5 MG: 5 TABLET ORAL at 09:13

## 2021-06-17 RX ADMIN — ESTRADIOL 2 G: 0.1 CREAM VAGINAL at 21:30

## 2021-06-17 RX ADMIN — LATANOPROST 1 DROP: 50 SOLUTION OPHTHALMIC at 21:30

## 2021-06-17 RX ADMIN — POTASSIUM CHLORIDE 10 MEQ: 750 TABLET, FILM COATED, EXTENDED RELEASE ORAL at 09:13

## 2021-06-17 RX ADMIN — METOPROLOL SUCCINATE 25 MG: 25 TABLET, EXTENDED RELEASE ORAL at 09:13

## 2021-06-17 RX ADMIN — FUROSEMIDE 20 MG: 20 TABLET ORAL at 09:13

## 2021-06-17 RX ADMIN — SODIUM CHLORIDE, PRESERVATIVE FREE 10 ML: 5 INJECTION INTRAVENOUS at 21:31

## 2021-06-17 RX ADMIN — ASPIRIN 81 MG: 81 TABLET, CHEWABLE ORAL at 09:12

## 2021-06-17 RX ADMIN — SODIUM CHLORIDE, PRESERVATIVE FREE 10 ML: 5 INJECTION INTRAVENOUS at 09:13

## 2021-06-17 RX ADMIN — PANTOPRAZOLE SODIUM 40 MG: 40 TABLET, DELAYED RELEASE ORAL at 09:12

## 2021-06-17 RX ADMIN — ESCITALOPRAM OXALATE 5 MG: 10 TABLET ORAL at 09:12

## 2021-06-17 RX ADMIN — ENOXAPARIN SODIUM 30 MG: 30 INJECTION SUBCUTANEOUS at 09:13

## 2021-06-17 RX ADMIN — ACETAMINOPHEN 650 MG: 325 TABLET ORAL at 22:37

## 2021-06-17 ASSESSMENT — PAIN SCALES - GENERAL
PAINLEVEL_OUTOF10: 0
PAINLEVEL_OUTOF10: 3
PAINLEVEL_OUTOF10: 0

## 2021-06-17 ASSESSMENT — PAIN - FUNCTIONAL ASSESSMENT: PAIN_FUNCTIONAL_ASSESSMENT: PREVENTS OR INTERFERES SOME ACTIVE ACTIVITIES AND ADLS

## 2021-06-17 ASSESSMENT — PAIN DESCRIPTION - PROGRESSION: CLINICAL_PROGRESSION: GRADUALLY WORSENING

## 2021-06-17 ASSESSMENT — PAIN DESCRIPTION - ONSET: ONSET: ON-GOING

## 2021-06-17 ASSESSMENT — PAIN DESCRIPTION - PAIN TYPE: TYPE: CHRONIC PAIN

## 2021-06-17 ASSESSMENT — PAIN DESCRIPTION - DESCRIPTORS: DESCRIPTORS: ACHING

## 2021-06-17 ASSESSMENT — PAIN DESCRIPTION - ORIENTATION: ORIENTATION: RIGHT

## 2021-06-17 ASSESSMENT — PAIN DESCRIPTION - FREQUENCY: FREQUENCY: CONTINUOUS

## 2021-06-17 ASSESSMENT — PAIN DESCRIPTION - LOCATION: LOCATION: LEG

## 2021-06-17 NOTE — PROGRESS NOTES
Occupational Therapy  Facility/Department: [facility] PROGRESSIVE CARE  Daily Treatment Note  NAME: La Kamara  : 3/11/1929  MRN: [id number]    Date of Service: 2021    Discharge Recommendations:  24 hour supervision or assist, Home with Home health OT (initially would benefit from supervision to see how she does in her own environment)  OT Equipment Recommendations  Equipment Needed: No  La Kamara scored a 19/24 on the AM-PAC ADL Inpatient form. Current research shows that an AM-PAC score of 18 or greater is typically associated with a discharge to the patient's home setting. Based on the patient's AM-PAC score, and their current ADL deficits, it is recommended that the patient have 2-3 sessions per week of Occupational Therapy at d/c to increase the patient's independence. At this time, this patient demonstrates the endurance and safety to discharge home with home OT and initial supervision and a follow up treatment frequency of 2-3x/wk. Please see assessment section for further patient specific details. If patient discharges prior to next session this note will serve as a discharge summary. Please see below for the latest assessment towards goals. Assessment   Performance deficits / Impairments: Decreased functional mobility ; Decreased endurance;Decreased ADL status; Decreased safe awareness;Decreased high-level IADLs;Decreased cognition;Decreased balance  Assessment: Pt completed shower, dressed with SBA, chair to sit for shower and grab bars for safety. Transfsers and functional mobilty using rolling walker with SBA, very minimal dizziness, did not affect her function/safety. Pt stated she would like to return home, will have someone stay with her initially at night. She would benefit from someone staying with her to observe her preparing meal in microwave, taking medication, making sure her dizziness is under control and home OT for carryover in her own environment.   She stated she could stay on the first floor if needed, she wont take a shower for a few days as this is on the second floor. She may benefit from a life alert button. Treatment Diagnosis: impaired ADL and fxl mobility  Prognosis: Good  OT Education: Transfer Training;ADL Adaptive Strategies  REQUIRES OT FOLLOW UP: Yes  Activity Tolerance  Activity Tolerance: Patient Tolerated treatment well  Activity Tolerance: min dizziness upon initial stance, but no further dizziness with ADL  Safety Devices  Safety Devices in place: Yes  Type of devices: Nurse notified; Left in chair;Patient at risk for falls;Gait belt;Call light within reach; Chair alarm in place         Patient Diagnosis(es): The primary encounter diagnosis was Dizziness. Diagnoses of Blurred vision and Shortness of breath were also pertinent to this visit. has a past medical history of Arthritis, Blood circulation, collateral, Cataract, Clostridium difficile infection, GERD (gastroesophageal reflux disease), Glaucoma, Hyperlipidemia, Hypertension, Macular degeneration, and UTI (urinary tract infection). has a past surgical history that includes Cataract removal (Bilateral, 2008); Total knee arthroplasty; Hysterectomy (1980); Varicose vein surgery (1967 leticia, 1984 left.); Colonoscopy; hernia repair (Left, 06/08/2018); and hernia repair (Right, 6/4/2020). Restrictions  Restrictions/Precautions  Restrictions/Precautions: Fall Risk  Position Activity Restriction  Other position/activity restrictions: telemetry  Subjective   General  Chart Reviewed: Yes  Patient assessed for rehabilitation services?: Yes  Additional Pertinent Hx: 81 yo female admitted 6/14 with Blurred vision- bilateral--acute onset--assoc with dizziness--MRI of brain neg, and RLE pain. PMH: R TKA, hypertension, hyperlipidemia, maculardegeneration prior history of gastroesophageal reflux and UTI.   Response to previous treatment: Patient with no complaints from previous session  Family / keeper at home for most of her medications. Plans to use TV dinners, use microwave for prep                                         Plan   Plan  Times per week: 3-5  Times per day: Daily  Current Treatment Recommendations: Strengthening, Functional Mobility Training, Patient/Caregiver Education & Training, ROM, Endurance Training, Pain Management, Balance Training, Safety Education & Training, Self-Care / ADL                                               AM-PAC Score        AM-PAC Inpatient Daily Activity Raw Score: 19 (06/17/21 1641)  AM-PAC Inpatient ADL T-Scale Score : 40.22 (06/17/21 1641)  ADL Inpatient CMS 0-100% Score: 42.8 (06/17/21 1641)  ADL Inpatient CMS G-Code Modifier : CK (06/17/21 1641)    Goals  Short term goals  Time Frame for Short term goals: prior to d/c: 6/16 progressing/continue  Short term goal 1: toileting SUP  Short term goal 2: ADL tx SUP  Short term goal 3: UB bathing/dresing SUP  Short term goal 4: LB dressing SUP  Short term goal 5:  Tolerate 5 min fxl standing task SUP  Patient Goals   Patient goals : to go home       Therapy Time   Individual Concurrent Group Co-treatment   Time In 2210         Time Out 1630         Minutes 60         Timed Code Treatment Minutes: [address] [facility] [address], OTr/L [id number]

## 2021-06-17 NOTE — CARE COORDINATION
Case Management:  Talked to daughter with patient permission and she tells cm that her mom is looking to go to [facility] for a short stay. Referral faxed and called to [facility] admissions.   Electronically signed by Mayra Hidalgo on 6/17/2021 at 5:28 PM

## 2021-06-17 NOTE — PLAN OF CARE
Problem: Falls - Risk of:  Goal: Will remain free from falls  Description: Will remain free from falls  Outcome: Ongoing  Goal: Absence of physical injury  Description: Absence of physical injury  Problem: SAFETY  Goal: Free from accidental physical injury  Outcome: Ongoing     Problem: DAILY CARE  Goal: Daily care needs are met  Outcome: Ongoing     Problem: PAIN  Goal: Patient's pain/discomfort is manageable  Outcome: Ongoing     Problem: SKIN INTEGRITY  Goal: Skin integrity is maintained or improved  Outcome: Ongoing     Problem: KNOWLEDGE DEFICIT  Goal: Patient/S.O. demonstrates understanding of disease process, treatment plan, medications, and discharge instructions.   Outcome: Ongoing     Problem: DISCHARGE BARRIERS  Goal: Patient's continuum of care needs are met  Outcome: Ongoing

## 2021-06-17 NOTE — PROGRESS NOTES
[facility] Progress Note  6/17/2021 9:42 AM  Subjective:   Admit Date: 6/14/2021      Chief Complaint: I dont feel well    Interval History: Called nsg--I cant move my legs--quite anxious--no focal deficits   MRI of brain--no acute strloe--several small lacunes --meningioma--chr   Her anxiety and multi-symptom complaints persist   MRI of l/s spine--DDD --would pursue PT--not a surg indiation     Diet: ADULT DIET; Regular; No Added Salt (3-4 gm)  Medications:   Scheduled Meds:   estradiol  2 g Vaginal Daily    escitalopram  5 mg Oral Daily    amLODIPine  2.5 mg Oral Daily    aspirin  81 mg Oral Daily    furosemide  20 mg Oral Daily    latanoprost  1 drop Both Eyes Nightly    metoprolol succinate  25 mg Oral Daily    PreserVision AREDS 2  2 capsule Oral Daily    pantoprazole  40 mg Oral QAM AC    potassium chloride  10 mEq Oral Daily    sodium chloride flush  5-40 mL Intravenous 2 times per day    enoxaparin  30 mg Subcutaneous Daily     Continuous Infusions:   sodium chloride         Review of Systems -   General ROS: afebrile  Respiratory ROS: no cough, shortness of breath or wheezing  Cardiovascular ROS: no chest pain  Musculoskeletal ROS:positive for - low back pain   Neurological ROS: anxiety and somatic c/o     Objective:   Vitals: BP (!) 144/78   Pulse 63   Temp 98 °F (36.7 °C) (Oral)   Resp 16   Ht 5' (1.524 m)   Wt 149 lb 4 oz (67.7 kg)   SpO2 97%   BMI 29.15 kg/m²   General appearance: alert and cooperative with exam  HEENT: Head: Normocephalic, no lesions, without obvious abnormality.   Neck: no adenopathy, no carotid bruit, no JVD, supple, symmetrical, trachea midline and thyroid not enlarged, symmetric, no tenderness/mass/nodules  Lungs: clear to auscultation bilaterally  Heart: regular rate and rhythm, S1, S2 normal, no murmur, click, rub or gallop  Abdomen: soft, non-tender; bowel sounds normal; no masses,  no organomegaly  Extremities: mild low back pain   Neurologic: Mental status: anxious--no focal deficits     Admission on 06/14/2021   Component Date Value Ref Range Status    Ventricular Rate 06/14/2021 66  BPM Final    Atrial Rate 06/14/2021 66  BPM Final    P-R Interval 06/14/2021 162  ms Final    QRS Duration 06/14/2021 74  ms Final    Q-T Interval 06/14/2021 378  ms Final    QTc Calculation (Bazett) 06/14/2021 396  ms Final    P Axis 06/14/2021 54  degrees Final    R Axis 06/14/2021 -7  degrees Final    T Axis 06/14/2021 -2  degrees Final    Diagnosis 06/14/2021 Normal sinus rhythmLow voltage QRS in precordial leadsCannot rule out Anterior infarct (cited on or before 09-OCT-2020)Abnormal ECGWhen compared with ECG of 09-OCT-2020 18:02,Possible lead misplacement in precordial leads (V2).  Confirmed by Pedrito HAYWOOD MD ([id number]) on 6/15/2021 9:06:35 AM   Final    WBC 06/14/2021 3.9* 4.0 - 11.0 K/uL Final    RBC 06/14/2021 4.52  4.00 - 5.20 M/uL Final    Hemoglobin 06/14/2021 14.1  12.0 - 16.0 g/dL Final    Hematocrit 06/14/2021 41.6  36.0 - 48.0 % Final    MCV 06/14/2021 92.0  80.0 - 100.0 fL Final    MCH 06/14/2021 31.1  26.0 - 34.0 pg Final    MCHC 06/14/2021 33.8  31.0 - 36.0 g/dL Final    RDW 06/14/2021 13.3  12.4 - 15.4 % Final    Platelets 78/32/4720 203  135 - 450 K/uL Final    MPV 06/14/2021 8.2  5.0 - 10.5 fL Final    Neutrophils % 06/14/2021 67.1  % Final    Lymphocytes % 06/14/2021 21.3  % Final    Monocytes % 06/14/2021 10.6  % Final    Eosinophils % 06/14/2021 0.7  % Final    Basophils % 06/14/2021 0.3  % Final    Neutrophils Absolute 06/14/2021 2.6  1.7 - 7.7 K/uL Final    Lymphocytes Absolute 06/14/2021 0.8* 1.0 - 5.1 K/uL Final    Monocytes Absolute 06/14/2021 0.4  0.0 - 1.3 K/uL Final    Eosinophils Absolute 06/14/2021 0.0  0.0 - 0.6 K/uL Final    Basophils Absolute 06/14/2021 0.0  0.0 - 0.2 K/uL Final    Sodium 06/14/2021 141  136 - 145 mmol/L Final    Potassium reflex Magnesium 06/14/2021 3.7  3.5 - 5.1 mmol/L Final    Chloride 06/14/2021 103  99 - 110 mmol/L Final    CO2 06/14/2021 26  21 - 32 mmol/L Final    Anion Gap 06/14/2021 12  3 - 16 Final    Glucose 06/14/2021 99  70 - 99 mg/dL Final    BUN 06/14/2021 15  7 - 20 mg/dL Final    CREATININE 06/14/2021 0.7  0.6 - 1.2 mg/dL Final    GFR Non- 06/14/2021 >60  >60 Final    Comment: >60 mL/min/1.73m2 EGFR, calc. for ages 25 and older using the  MDRD formula (not corrected for weight), is valid for stable  renal function.  GFR  06/14/2021 >60  >60 Final    Comment: Chronic Kidney Disease: less than 60 ml/min/1.73 sq.m. Kidney Failure: less than 15 ml/min/1.73 sq.m. Results valid for patients 18 years and older.       Calcium 06/14/2021 9.5  8.3 - 10.6 mg/dL Final    Total Protein 06/14/2021 7.2  6.4 - 8.2 g/dL Final    Albumin 06/14/2021 4.4  3.4 - 5.0 g/dL Final    Albumin/Globulin Ratio 06/14/2021 1.6  1.1 - 2.2 Final    Total Bilirubin 06/14/2021 0.6  0.0 - 1.0 mg/dL Final    Alkaline Phosphatase 06/14/2021 63  40 - 129 U/L Final    ALT 06/14/2021 8* 10 - 40 U/L Final    AST 06/14/2021 17  15 - 37 U/L Final    Globulin 06/14/2021 2.8  g/dL Final    Color, UA 06/14/2021 YELLOW  Straw/Yellow Final    Clarity, UA 06/14/2021 Clear  Clear Final    Glucose, Ur 06/14/2021 Negative  Negative mg/dL Final    Bilirubin Urine 06/14/2021 Negative  Negative Final    Ketones, Urine 06/14/2021 Negative  Negative mg/dL Final    Specific Hardy, UA 06/14/2021 1.006  1.005 - 1.030 Final    Blood, Urine 06/14/2021 Negative  Negative Final    pH, UA 06/14/2021 7.0  5.0 - 8.0 Final    Protein, UA 06/14/2021 Negative  Negative mg/dL Final    Urobilinogen, Urine 06/14/2021 0.2  <2.0 E.U./dL Final    Nitrite, Urine 06/14/2021 Negative  Negative Final    Leukocyte Esterase, Urine 06/14/2021 Negative  Negative Final    Microscopic Examination 06/14/2021 Not Indicated   Final    Urine Type 06/14/2021 NotGiven   Final    Urine Reflex to Culture authorized by the FDA under an Emergency Use  Authorization (EUA) for use by authorized laboratories.     Fact sheet for Healthcare Providers:  BuildHer.es  Fact sheet for Patients: BuildHer.es    METHODOLOGY: Isothermal Nucleic Acid Amplification      Troponin 06/14/2021 <0.01  <0.01 ng/mL Final    Methodology by Troponin T    Ventricular Rate 06/14/2021 65  BPM Final    Atrial Rate 06/14/2021 65  BPM Final    P-R Interval 06/14/2021 172  ms Final    QRS Duration 06/14/2021 64  ms Final    Q-T Interval 06/14/2021 384  ms Final    QTc Calculation (Bazett) 06/14/2021 399  ms Final    P Axis 06/14/2021 35  degrees Final    R Axis 06/14/2021 -13  degrees Final    T Axis 06/14/2021 -9  degrees Final    Diagnosis 06/14/2021 Normal sinus rhythmLow voltage QRS in precordial leadsPossible Inferior infarct , age undeterminedCannot rule out Anterior infarct (cited on or before 09-OCT-2020)Nonspecific T wave abnormalityAbnormal ECGWhen compared with ECG of 14-JUN-2021 10:06,No significant change was foundConfirmed by Katherin HAYWOOD MD ([id number]) on 6/15/2021 9:07:31 AM   Final    WBC 06/15/2021 5.0  4.0 - 11.0 K/uL Final    RBC 06/15/2021 4.15  4.00 - 5.20 M/uL Final    Hemoglobin 06/15/2021 13.4  12.0 - 16.0 g/dL Final    Hematocrit 06/15/2021 38.2  36.0 - 48.0 % Final    MCV 06/15/2021 92.1  80.0 - 100.0 fL Final    MCH 06/15/2021 32.2  26.0 - 34.0 pg Final    MCHC 06/15/2021 35.0  31.0 - 36.0 g/dL Final    RDW 06/15/2021 13.2  12.4 - 15.4 % Final    Platelets 29/13/9763 191  135 - 450 K/uL Final    MPV 06/15/2021 8.8  5.0 - 10.5 fL Final    Sodium 06/15/2021 139  136 - 145 mmol/L Final    Potassium 06/15/2021 3.8  3.5 - 5.1 mmol/L Final    Chloride 06/15/2021 103  99 - 110 mmol/L Final    CO2 06/15/2021 23  21 - 32 mmol/L Final    Anion Gap 06/15/2021 13  3 - 16 Final    Glucose 06/15/2021 90  70 - 99 mg/dL Final    BUN 06/15/2021 14  7 - 20

## 2021-06-17 NOTE — PROGRESS NOTES
Physical Therapy  Facility/Department: [address] PROGRESSIVE CARE  Daily Treatment Note  NAME: Francisco Javier Gomez  : 3/11/1929  MRN: [id number]    Date of Service: 2021    Discharge Recommendations:  24 hour supervision or assist, S Level 3, 3-5 sessions per week (unsure how much assist family can provide)   PT Equipment Recommendations  Equipment Needed: No  Francisco Javier Gomez scored a 20/24 on the AM-PAC short mobility form. Current research shows that an AM-PAC score of 18 or greater is typically associated with a discharge to the patient's home setting. Despite the patient's AM-PAC score and their current functional mobility deficits, it is recommended that the patient have continued Physical Therapy at d/c to increase the patient's independence. At this time, if family able to provide assist pt needs then home with family assist and level 3 home PT. However if family unable to provide assist pt needs then 3-5x/wk therapy in SNF to address deficits as well as safety issues as pt has multiple stairs in home and is currently a high fall risk. If patient discharges prior to next session this note will serve as a discharge summary. Please see below for the latest assessment towards goals. Assessment   Body structures, Functions, Activity limitations: Decreased functional mobility   Assessment: pt is a 79 yo female who was adm to hosp with dizziness and stroke-like symptoms. Pt's MRI (-) for acute infarct. Pt currently is below her baseline for functional tasks as she is now using a RW and she appears to have some deficits in safety awareness;  Recommend 24/7 assist during waking hours and continued PT; if family unable to provide assist pt needs then pt may need 3-5x/wk therapy to address deficits as pt has multiple stairs in her home and doubtful she would be safe alone on stairs  Prognosis: Good;Fair  Decision Making: Medium Complexity  Clinical Presentation: evolving  PT Education: General Orientation Status: Within Functional Limits  Cognition   Cognition  Overall Cognitive Status: [facility]  Cognition Comment: pt has some deficits in safety awareness and forgetful  Objective   Bed mobility  Supine to Sit: Supervision  Sit to Supine: Unable to assess  Scooting: Supervision  Comment: HOB elevated  Transfers  Sit to Stand: Stand by assistance  Stand to sit: Stand by assistance  Ambulation  Ambulation?: Yes  Ambulation 1  Surface: level tile  Device: Rolling Walker  Assistance: Stand by assistance  Quality of Gait: slow pace but no LOB; pt needed directions as unable to find her room after walking out in hallway  Gait Deviations: Decreased step length;Shuffles  Distance: 10' and 150'     Balance  Comments: sup for sitting balance; SBA for standing balance with RW            Comment: pt stood and brushed her teeth with SBA; assisted with positioning in chair for comfort with all needs in reach              G-Code     OutComes Score                                                     AM-PAC Score  AM-PAC Inpatient Mobility Raw Score : 20 (06/15/21 0847)  -PAC Inpatient T-Scale Score : 47.67 (06/15/21 0847)  Mobility Inpatient CMS 0-100% Score: 35.83 (06/15/21 08)  Mobility Inpatient CMS G-Code Modifier : CJ (06/15/21 0847)          Goals  Short term goals  Time Frame for Short term goals: by discharge  Short term goal 1: transfers MI  Short term goal 2: amb 100' with or without AD SBA/sup  Short term goal 3: negotiate stairs when able  Patient Goals   Patient goals : to go home    Plan    Plan  Times per week: 3-5  Current Treatment Recommendations: Functional Mobility Training  Safety Devices  Type of devices: Call light within reach, Chair alarm in place, Left in chair, All fall risk precautions in place, Nurse notified     Therapy Time   Individual Concurrent Group Co-treatment   Time In 1335         Time Out 1415         Minutes 40                 SOTERO FOY PT    Electronically signed by SOTERO MENDEZ ULM, PT on 6/17/2021 at 3:54 PM

## 2021-06-17 NOTE — PLAN OF CARE
Problem: Falls - Risk of:  Goal: Will remain free from falls  Description: Will remain free from falls  6/17/2021 1110 by Ruslan Soriano RN  Outcome: Ongoing  Note: Fall risk assessment completed per unit protocol. Patient's bed is in the lowest position, call light is within reach and the patient's room is free of clutter. The patient has been instructed to call for assistance before getting out of bed or the chair. 6/17/2021 0341 by Toño Galindo RN  Outcome: Ongoing  Goal: Absence of physical injury  Description: Absence of physical injury  6/17/2021 1110 by Ruslan Soriano RN  Outcome: Ongoing  6/17/2021 0341 by Toño Galindo RN  Outcome: Ongoing     Problem: SAFETY  Goal: Free from accidental physical injury  6/17/2021 1110 by Ruslan Soriano RN  Outcome: Ongoing  Note: Patient educated on unit safety measures. Bed alarm/chair alarm and using the call light before getting up. Patient belongings placed close by and are accessible to patient. Patient was educated on how to use call light. Patient verbalized understanding of when to use the call light. Hourly rounding in use. Will continue to monitor. 6/17/2021 0341 by Toño Galindo RN  Outcome: Ongoing     Problem: DAILY CARE  Goal: Daily care needs are met  6/17/2021 1110 by Ruslan Soriano RN  Outcome: Ongoing  6/17/2021 0341 by Toño Galindo RN  Outcome: Ongoing     Problem: PAIN  Goal: Patient's pain/discomfort is manageable  6/17/2021 1110 by Ruslan Soriano RN  Outcome: Ongoing  6/17/2021 0341 by Toño Galindo RN  Outcome: Ongoing     Problem: SKIN INTEGRITY  Goal: Skin integrity is maintained or improved  6/17/2021 1110 by Ruslan Soriano RN  Outcome: Ongoing  Note: Patient's skin has been assessed per unit protocol and the patient is being repositioned or encouraged to turn every two hours to prevent skin breakdown and promote healing.      6/17/2021 0341 by Toño Galindo RN  Outcome: Ongoing     Problem: KNOWLEDGE DEFICIT  Goal: Patient/S.O. demonstrates understanding of disease process, treatment plan, medications, and discharge instructions.   6/17/2021 1110 by Cecelia Connelly RN  Outcome: Ongoing  6/17/2021 0341 by Thiago Garza RN  Outcome: Ongoing     Problem: DISCHARGE BARRIERS  Goal: Patient's continuum of care needs are met  6/17/2021 1110 by Cecelia Connelly RN  Outcome: Ongoing  6/17/2021 0341 by Thaigo Garza RN  Outcome: Ongoing

## 2021-06-17 NOTE — PROGRESS NOTES
Pt awakened from sleep with burning pain in legs/ feet and vaginal area. Placed cool compresses on feet and assisted pt to clean vaginal area with cool washcloth. Pt expressed relief but is questioning as to what is causing the burning pain in legs and feet.

## 2021-06-18 LAB
BLOOD CULTURE, ROUTINE: NORMAL
CULTURE, BLOOD 2: NORMAL

## 2021-06-18 PROCEDURE — 6360000002 HC RX W HCPCS: Performed by: INTERNAL MEDICINE

## 2021-06-18 PROCEDURE — 96372 THER/PROPH/DIAG INJ SC/IM: CPT

## 2021-06-18 PROCEDURE — 97116 GAIT TRAINING THERAPY: CPT | Performed by: PHYSICAL THERAPIST

## 2021-06-18 PROCEDURE — 2580000003 HC RX 258: Performed by: INTERNAL MEDICINE

## 2021-06-18 PROCEDURE — 99226 PR SBSQ OBSERVATION CARE/DAY 35 MINUTES: CPT | Performed by: INTERNAL MEDICINE

## 2021-06-18 PROCEDURE — G0378 HOSPITAL OBSERVATION PER HR: HCPCS

## 2021-06-18 PROCEDURE — 6370000000 HC RX 637 (ALT 250 FOR IP): Performed by: INTERNAL MEDICINE

## 2021-06-18 PROCEDURE — 99213 OFFICE O/P EST LOW 20 MIN: CPT | Performed by: NURSE PRACTITIONER

## 2021-06-18 RX ORDER — LORAZEPAM 0.5 MG/1
0.5 TABLET ORAL EVERY 8 HOURS PRN
Status: DISCONTINUED | OUTPATIENT
Start: 2021-06-18 | End: 2021-06-22 | Stop reason: HOSPADM

## 2021-06-18 RX ORDER — LORAZEPAM 0.5 MG/1
0.5 TABLET ORAL 2 TIMES DAILY
Qty: 60 TABLET | Refills: 0 | Status: SHIPPED | OUTPATIENT
Start: 2021-06-18 | End: 2021-07-18

## 2021-06-18 RX ORDER — LORAZEPAM 0.5 MG/1
0.5 TABLET ORAL 2 TIMES DAILY
Status: DISCONTINUED | OUTPATIENT
Start: 2021-06-18 | End: 2021-06-22 | Stop reason: HOSPADM

## 2021-06-18 RX ORDER — LORAZEPAM 0.5 MG/1
0.5 TABLET ORAL EVERY 8 HOURS PRN
Status: SHIPPED | OUTPATIENT
Start: 2021-06-18 | End: 2021-07-18

## 2021-06-18 RX ORDER — ESCITALOPRAM OXALATE 5 MG/1
5 TABLET ORAL DAILY
Qty: 30 TABLET | Refills: 3 | DISCHARGE
Start: 2021-06-19 | End: 2021-08-17 | Stop reason: CLARIF

## 2021-06-18 RX ADMIN — PANTOPRAZOLE SODIUM 40 MG: 40 TABLET, DELAYED RELEASE ORAL at 05:16

## 2021-06-18 RX ADMIN — SODIUM CHLORIDE, PRESERVATIVE FREE 10 ML: 5 INJECTION INTRAVENOUS at 08:10

## 2021-06-18 RX ADMIN — ASPIRIN 81 MG: 81 TABLET, CHEWABLE ORAL at 08:08

## 2021-06-18 RX ADMIN — METOPROLOL SUCCINATE 25 MG: 25 TABLET, EXTENDED RELEASE ORAL at 08:09

## 2021-06-18 RX ADMIN — FUROSEMIDE 20 MG: 20 TABLET ORAL at 08:09

## 2021-06-18 RX ADMIN — ESTRADIOL 2 G: 0.1 CREAM VAGINAL at 08:10

## 2021-06-18 RX ADMIN — LORAZEPAM 0.5 MG: 0.5 TABLET ORAL at 06:48

## 2021-06-18 RX ADMIN — LATANOPROST 1 DROP: 50 SOLUTION OPHTHALMIC at 20:58

## 2021-06-18 RX ADMIN — AMLODIPINE BESYLATE 2.5 MG: 5 TABLET ORAL at 08:08

## 2021-06-18 RX ADMIN — ESCITALOPRAM OXALATE 5 MG: 10 TABLET ORAL at 08:08

## 2021-06-18 RX ADMIN — SODIUM CHLORIDE, PRESERVATIVE FREE 10 ML: 5 INJECTION INTRAVENOUS at 21:04

## 2021-06-18 RX ADMIN — ENOXAPARIN SODIUM 30 MG: 30 INJECTION SUBCUTANEOUS at 08:09

## 2021-06-18 RX ADMIN — POTASSIUM CHLORIDE 10 MEQ: 750 TABLET, FILM COATED, EXTENDED RELEASE ORAL at 08:09

## 2021-06-18 NOTE — PROGRESS NOTES
Physical Therapy  Facility/Department: [address] PROGRESSIVE CARE  Daily Treatment Note  NAME: Nika Cintron  : 3/11/1929  MRN: [id number]    Date of Service: 2021    Discharge Recommendations:  3-5 sessions per week   PT Equipment Recommendations  Equipment Needed: No  Nika Cintron scored a 20/24 on the AM-PAC short mobility form. Current research shows that an AM-PAC score of 17 or less is typically not associated with a discharge to the patient's home setting. Despite the patient's AM-PAC score and their current functional mobility deficits, it is recommended that the patient have 3-5 sessions per week of Physical Therapy at d/c to increase the patient's independence. Pt lives alone and has minimal family support. She has multiple steps she needs to be able to negotiate Ind in her home and is an elevated fall risk. Please see assessment section for further patient specific details. If patient discharges prior to next session this note will serve as a discharge summary. Please see below for the latest assessment towards goals. Assessment   Body structures, Functions, Activity limitations: Decreased functional mobility   Assessment: pt is a 79 yo female who was adm to hosp with dizziness and stroke-like symptoms. Pt's MRI (-) for acute infarct. Pt is well below her baseline of being Ind without an AD and being able to negotiate a flight of stairs with 1 rail several times per day. Pt is able to amb with a RW with SBA however does not feel steady enough to attempt without a RW.   Pt is an elevated fall risk and would like to go to a rehab facility to get stronger and more Ind as she lives alone and has limited family assist; recommend 3-5x/wk to address deficits to allow pt to return home alone  Prognosis: Good  Decision Making: Medium Complexity  Clinical Presentation: evolving  Patient Education: reviewed call light and not getting up without assist  Barriers to Learning: very forgetful with decreased insight into safety issues  REQUIRES PT FOLLOW UP: Yes  Activity Tolerance  Activity Tolerance: Patient Tolerated treatment well     Patient Diagnosis(es): The primary encounter diagnosis was Dizziness. Diagnoses of Blurred vision and Shortness of breath were also pertinent to this visit. has a past medical history of Arthritis, Blood circulation, collateral, Cataract, Clostridium difficile infection, GERD (gastroesophageal reflux disease), Glaucoma, Hyperlipidemia, Hypertension, Macular degeneration, and UTI (urinary tract infection). has a past surgical history that includes Cataract removal (Bilateral, 2008); Total knee arthroplasty; Hysterectomy (1980); Varicose vein surgery (1967 leticia, 1984 left.); Colonoscopy; hernia repair (Left, 06/08/2018); and hernia repair (Right, 6/4/2020). Restrictions  Restrictions/Precautions  Restrictions/Precautions: Fall Risk  Position Activity Restriction  Other position/activity restrictions: telemetry  Subjective   General  Chart Reviewed: Yes  Additional Pertinent Hx: Pt is a 79 yo female adm to [facility] Blurred vision, bilateral--acute onset--assoc with dizziness--MRI of brain neg but definite acute sx--Active Problems:  Dizziness--assoc with blurred vision--?? Lacunar CVA   Pain of right thigh--?? Etiology--neg doppler   Pure hypercholesterolemia  Essential hypertension--Continue current therapy  Response To Previous Treatment: Patient with no complaints from previous session.   Family / Caregiver Present: No  Referring Practitioner: Dr Mehran Zavaleta: pt very pleasant this date; eager to go for a walk; pt wanted to rashmi her compression stockings which she did while sitting at EOB; pt able to slip on her shoes as well  General Comment  Comments: pt reported no pain but stated she had a bad night in which she said she had tingling up her legs          Orientation  Orientation  Overall Orientation Status: Within Functional Limits  Cognition   Cognition  Overall Cognitive Status: WFL  Objective   Bed mobility  Comment: pt up with nursing at beginning of session and stayed up in chair at end of session  Transfers  Sit to Stand: Stand by assistance  Stand to sit: Stand by assistance  Ambulation  Ambulation?: Yes  Ambulation 1  Surface: level tile  Device: Rolling Walker  Assistance: Stand by assistance  Quality of Gait: pt amb well with RW without any LOB; pt did not want to attempt without RW as she did not feel steady enough to do so (pt does not use a walker at home)  Distance: 200' and 125'     Balance  Comments: sup for sitting balance; SBA for standing balance with RW            Comment: pt stood and brushed her teeth with SBA; assisted with positioning in chair for comfort with all needs in reach              G-Code     OutComes Score                                                     AM-PAC Score  AM-PAC Inpatient Mobility Raw Score : 20 (06/18/21 1218)  AM-PAC Inpatient T-Scale Score : 47.67 (06/18/21 1218)  Mobility Inpatient CMS 0-100% Score: 35.83 (06/18/21 1218)  Mobility Inpatient CMS G-Code Modifier : Velvet Maria (06/18/21 1218)          Goals  Short term goals  Time Frame for Short term goals: by discharge  Short term goal 1: transfers MI  Short term goal 2: amb 100' with or without AD SBA/sup  Short term goal 3: negotiate stairs when able  Patient Goals   Patient goals : pt now wants to go to a rehab facility to get stronger and be able to amb without a walker    Plan    Plan  Times per week: 3-5  Current Treatment Recommendations: Functional Mobility Training  Safety Devices  Type of devices: Call light within reach, Chair alarm in place, Left in chair, All fall risk precautions in place, Nurse notified     Therapy Time   Individual Concurrent Group Co-treatment   Time In 1143         Time Out 1218         Minutes 35                 SOTERO FOY PT    Electronically signed by SOTERO FOY PT on 6/18/2021 at 12:20 PM

## 2021-06-18 NOTE — CARE COORDINATION
[facility] PRE-CERT REQUEST SUBMITTED VIA [facility] ACCESS PORTAL    REQUESTED SETTING:   [facility]    ANTICIPATED ADMIT DATE TO SNF:  6/18/1921    [facility] AT [place] #:  [id number]    Mabelene Jeans, Sr.  Administrative Assist, Case Management  [phone]  Electronically signed by Mabelene Jeans on 6/18/2021 at 2:19 PM

## 2021-06-18 NOTE — CARE COORDINATION
DISCHARGE SUMMARY     DATE OF DISCHARGE:  6/18     DISCHARGE DESTINATION: SNF  Pending insurance approval    FACILITY    Level of Care:  Skilled     Report Number: [phone]  Fax Number:  [phone]    Hens completed  6/18/2021     TRANSPORTATION:   Pending     Company Name:      [employer] up Time:     Phone Number:     COMMENTS: Discharge order noted, insurance prior authorization pending. Patient does not need COVID test as she has been fully vaccinated. Will need to notify family once prior authorization is received.   Electronically signed by Ronnie Woody on 6/18/2021 at 4:57 PM

## 2021-06-18 NOTE — PROGRESS NOTES
Called to pt room by PCA. Pt complaining she cannot breath and that she has pain in her legs, her belly is hurting. She says she is very anxious. She says she cannot move her arms but is pulling herself up and waving them in the air. Since pt said she is anxious, she agreed to take her ativan. She normally takes it about 10 PM and at 1 in afternoon. Since this is a PRN, it does not appear that pt is taking it regularly like she was at home. VSS. Will monitor closely.

## 2021-06-18 NOTE — CONSULTS
Both Eyes Nightly    metoprolol succinate  25 mg Oral Daily    PreserVision AREDS 2  2 capsule Oral Daily    pantoprazole  40 mg Oral QAM AC    potassium chloride  10 mEq Oral Daily    sodium chloride flush  5-40 mL Intravenous 2 times per day    enoxaparin  30 mg Subcutaneous Daily     LORazepam, sodium chloride flush, sodium chloride, ondansetron **OR** ondansetron, polyethylene glycol, acetaminophen **OR** acetaminophen    Examination  Review of Systems - Psychological ROS: positive for - anxiety    Recent Results (from the past 168 hour(s))   EKG 12 Lead    Collection Time: 06/14/21 10:06 AM   Result Value Ref Range    Ventricular Rate 66 BPM    Atrial Rate 66 BPM    P-R Interval 162 ms    QRS Duration 74 ms    Q-T Interval 378 ms    QTc Calculation (Bazett) 396 ms    P Axis 54 degrees    R Axis -7 degrees    T Axis -2 degrees    Diagnosis       Normal sinus rhythmLow voltage QRS in precordial leadsCannot rule out Anterior infarct (cited on or before 09-OCT-2020)Abnormal ECGWhen compared with ECG of 09-OCT-2020 18:02,Possible lead misplacement in precordial leads (V2).  Confirmed by Niya Rodriguez MD, Marianela Winkler ([id number]) on 6/15/2021 9:06:35 AM   CBC Auto Differential    Collection Time: 06/14/21 10:30 AM   Result Value Ref Range    WBC 3.9 (L) 4.0 - 11.0 K/uL    RBC 4.52 4.00 - 5.20 M/uL    Hemoglobin 14.1 12.0 - 16.0 g/dL    Hematocrit 41.6 36.0 - 48.0 %    MCV 92.0 80.0 - 100.0 fL    MCH 31.1 26.0 - 34.0 pg    MCHC 33.8 31.0 - 36.0 g/dL    RDW 13.3 12.4 - 15.4 %    Platelets 562 709 - 416 K/uL    MPV 8.2 5.0 - 10.5 fL    Neutrophils % 67.1 %    Lymphocytes % 21.3 %    Monocytes % 10.6 %    Eosinophils % 0.7 %    Basophils % 0.3 %    Neutrophils Absolute 2.6 1.7 - 7.7 K/uL    Lymphocytes Absolute 0.8 (L) 1.0 - 5.1 K/uL    Monocytes Absolute 0.4 0.0 - 1.3 K/uL    Eosinophils Absolute 0.0 0.0 - 0.6 K/uL    Basophils Absolute 0.0 0.0 - 0.2 K/uL   Comprehensive Metabolic Panel w/ Reflex to MG    Collection Time: 06/14/21 10:30 AM   Result Value Ref Range    Sodium 141 136 - 145 mmol/L    Potassium reflex Magnesium 3.7 3.5 - 5.1 mmol/L    Chloride 103 99 - 110 mmol/L    CO2 26 21 - 32 mmol/L    Anion Gap 12 3 - 16    Glucose 99 70 - 99 mg/dL    BUN 15 7 - 20 mg/dL    CREATININE 0.7 0.6 - 1.2 mg/dL    GFR Non-African American >60 >60    GFR African American >60 >60    Calcium 9.5 8.3 - 10.6 mg/dL    Total Protein 7.2 6.4 - 8.2 g/dL    Albumin 4.4 3.4 - 5.0 g/dL    Albumin/Globulin Ratio 1.6 1.1 - 2.2    Total Bilirubin 0.6 0.0 - 1.0 mg/dL    Alkaline Phosphatase 63 40 - 129 U/L    ALT 8 (L) 10 - 40 U/L    AST 17 15 - 37 U/L    Globulin 2.8 g/dL   Urine, reflex to culture    Collection Time: 06/14/21 10:30 AM    Specimen: Urine, clean catch   Result Value Ref Range    Color, UA YELLOW Straw/Yellow    Clarity, UA Clear Clear    Glucose, Ur Negative Negative mg/dL    Bilirubin Urine Negative Negative    Ketones, Urine Negative Negative mg/dL    Specific Gravity, UA 1.006 1.005 - 1.030    Blood, Urine Negative Negative    pH, UA 7.0 5.0 - 8.0    Protein, UA Negative Negative mg/dL    Urobilinogen, Urine 0.2 <2.0 E.U./dL    Nitrite, Urine Negative Negative    Leukocyte Esterase, Urine Negative Negative    Microscopic Examination Not Indicated     Urine Type NotGiven     Urine Reflex to Culture Not Indicated    Lactate, Sepsis    Collection Time: 06/14/21 10:30 AM   Result Value Ref Range    Lactic Acid, Sepsis 0.7 0.4 - 1.9 mmol/L   Procalcitonin    Collection Time: 06/14/21 10:30 AM   Result Value Ref Range    Procalcitonin 0.05 0.00 - 0.15 ng/mL   Troponin    Collection Time: 06/14/21 10:30 AM   Result Value Ref Range    Troponin <0.01 <0.01 ng/mL   EKG 12 Lead    Collection Time: 06/14/21 11:19 AM   Result Value Ref Range    Ventricular Rate 65 BPM    Atrial Rate 65 BPM    P-R Interval 172 ms    QRS Duration 64 ms    Q-T Interval 384 ms    QTc Calculation (Bazett) 399 ms    P Axis 35 degrees    R Axis -13 degrees    T Axis -9 degrees Diagnosis       Normal sinus rhythmLow voltage QRS in precordial leadsPossible Inferior infarct , age undeterminedCannot rule out Anterior infarct (cited on or before 09-OCT-2020)Nonspecific T wave abnormalityAbnormal ECGWhen compared with ECG of 14-JUN-2021 10:06,No significant change was foundConfirmed by Saud Harris MD, Grayson Hernandez ([id number]) on 6/15/2021 9:07:31 AM   COVID-19, Rapid    Collection Time: 06/14/21 12:47 PM   Result Value Ref Range    SARS-CoV-2, NAAT Not Detected Not Detected   Lactate, Sepsis    Collection Time: 06/14/21  3:40 PM   Result Value Ref Range    Lactic Acid, Sepsis 0.9 0.4 - 1.9 mmol/L   Culture, Blood 1    Collection Time: 06/14/21  3:40 PM    Specimen: Blood   Result Value Ref Range    Blood Culture, Routine       No Growth to date. Any change in status will be called. Culture, Blood 2    Collection Time: 06/14/21  3:51 PM    Specimen: Blood   Result Value Ref Range    Culture, Blood 2       No Growth to date. Any change in status will be called.    CBC    Collection Time: 06/15/21  4:42 AM   Result Value Ref Range    WBC 5.0 4.0 - 11.0 K/uL    RBC 4.15 4.00 - 5.20 M/uL    Hemoglobin 13.4 12.0 - 16.0 g/dL    Hematocrit 38.2 36.0 - 48.0 %    MCV 92.1 80.0 - 100.0 fL    MCH 32.2 26.0 - 34.0 pg    MCHC 35.0 31.0 - 36.0 g/dL    RDW 13.2 12.4 - 15.4 %    Platelets 359 421 - 492 K/uL    MPV 8.8 5.0 - 10.5 fL   Basic Metabolic Panel    Collection Time: 06/15/21  4:42 AM   Result Value Ref Range    Sodium 139 136 - 145 mmol/L    Potassium 3.8 3.5 - 5.1 mmol/L    Chloride 103 99 - 110 mmol/L    CO2 23 21 - 32 mmol/L    Anion Gap 13 3 - 16    Glucose 90 70 - 99 mg/dL    BUN 14 7 - 20 mg/dL    CREATININE 0.6 0.6 - 1.2 mg/dL    GFR Non-African American >60 >60    GFR African American >60 >60    Calcium 9.1 8.3 - 10.6 mg/dL   Sedimentation Rate    Collection Time: 06/15/21  4:42 AM   Result Value Ref Range    Sed Rate 9 0 - 30 mm/Hr   Vitamin B12 & Folate    Collection Time: 06/15/21  9:58 AM   Result Clostridium difficile infection, GERD (gastroesophageal reflux disease), Glaucoma, Hyperlipidemia, Hypertension, Macular degeneration, and UTI (urinary tract infection). CC:    Chief Complaint   Patient presents with    Illness     woke up at 8am not feeling well    Shortness of Breath     slightly short of breath       S:  80 y.o. female now to  with c/o of dizziness, blurred vision, SOB, and generalized not feeling well. Woke up feeling dizzy and lightheaded. Code Stroke was called and work up was completed and Neuro was consulted. MRI negative overall. Ophthalmology consulted for vision changes. Yesterday, patient stated she could not move her arms or legs, NIH 0. Consulted because patient displaying increased anxiety. Patient takes Lorazepam 0.5 mg TID PRN for anxiety at home. Agreeable to go to [address] [place] [address] after d/c. Assc Sx:  Anxiousness, muscle tension, on and off issues with movement but moving well today. Duration:  Years    Severity:  Mod    Modifiers: Seems to get better with Lorazepam.     Timing: Chronic    A:     Past Medical History:   Diagnosis Date    Arthritis     Blood circulation, collateral     Cataract     Clostridium difficile infection 1/28/14    GERD (gastroesophageal reflux disease)     Glaucoma     Hyperlipidemia     no meds    Hypertension 2017    essential=pt denies    Macular degeneration     UTI (urinary tract infection)        Allergies   Allergen Reactions    Ciprofloxacin Other (See Comments)     Legs tingling    Oxycodone-Acetaminophen      Sick to stomach    Percocet [Oxycodone-Acetaminophen] Nausea And Vomiting       Personal Psych Hx:    Conduct Hx:    Social History     Socioeconomic History    Marital status:       Spouse name: None    Number of children: 2    Years of education: None    Highest education level: None   Occupational History    None   Tobacco Use    Smoking status: Never Smoker    Smokeless tobacco: Never Used   Vaping Use    Vaping Use: Never used   Substance and Sexual Activity    Alcohol use: No    Drug use: No    Sexual activity: Not Currently   Other Topics Concern    None   Social History Narrative    None     Social Determinants of Health     Financial Resource Strain:     Difficulty of Paying Living Expenses:    Food Insecurity:     Worried About Running Out of Food in the Last Year:     Ran Out of Food in the Last Year:    Transportation Needs:     Lack of Transportation (Medical):  Lack of Transportation (Non-Medical):    Physical Activity:     Days of Exercise per Week:     Minutes of Exercise per Session:    Stress:     Feeling of Stress :    Social Connections:     Frequency of Communication with Friends and Family:     Frequency of Social Gatherings with Friends and Family:     Attends Protestant Services:     Active Member of Clubs or Organizations:     Attends Club or Organization Meetings:     Marital Status:    Intimate Partner Violence:     Fear of Current or Ex-Partner:     Emotionally Abused:     Physically Abused:     Sexually Abused:        Family History   Problem Relation Age of Onset    Cancer Sister         pancreatic       Thank you for allowing me to participate in the care of this patient. If you have any concerns or questions, please call the [address] [place] [address] Drive at [phone]. Thank you. [place] C. Reyes Manifold CNP  Psychiatric Mental Health Nurse Practitioner

## 2021-06-18 NOTE — PROGRESS NOTES
[facility] Progress Note  6/18/2021 7:27 AM  Subjective:   Admit Date: 6/14/2021      Chief Complaint: I cant move my legs    Interval History: Called PCA into room--I cant move my legs but she is moving them--also feels SOB and very anxious  I did req psychiatry consult yest--Dr Jessica Urias said Nohelia Peoples NP would consult--no consult yet-  Patient is now willing to go go [facility]--will await psyche consult --lindsay madisong   She did take lorazepam at home 10 pm and afternoon reg basis     Diet: ADULT DIET; Regular; No Added Salt (3-4 gm)  Medications:   Scheduled Meds:   estradiol  2 g Vaginal Daily    escitalopram  5 mg Oral Daily    amLODIPine  2.5 mg Oral Daily    aspirin  81 mg Oral Daily    furosemide  20 mg Oral Daily    latanoprost  1 drop Both Eyes Nightly    metoprolol succinate  25 mg Oral Daily    PreserVision AREDS 2  2 capsule Oral Daily    pantoprazole  40 mg Oral QAM AC    potassium chloride  10 mEq Oral Daily    sodium chloride flush  5-40 mL Intravenous 2 times per day    enoxaparin  30 mg Subcutaneous Daily     Continuous Infusions:   sodium chloride         Review of Systems -   General ROS: afebrile  Respiratory ROS: no cough, shortness of breath or wheezing  Cardiovascular ROS: no chest pain  Musculoskeletal ROS:positive for - :joint pain  Neurological ROS: positive for - confusion    Objective:   Vitals: BP (!) 161/99   Pulse 67   Temp 97.3 °F (36.3 °C) (Oral)   Resp 16   Ht 5' (1.524 m)   Wt 139 lb 12.4 oz (63.4 kg)   SpO2 95%   BMI 27.30 kg/m²   General appearance: alert and cooperative with exam  HEENT: Head: Normocephalic, no lesions, without obvious abnormality.   Neck: no adenopathy, no carotid bruit, no JVD, supple, symmetrical, trachea midline and thyroid not enlarged, symmetric, no tenderness/mass/nodules  Lungs: clear to auscultation bilaterally  Heart: regular rate and rhythm, S1, S2 normal, no murmur, click, rub or gallop  Abdomen: soft, non-tender; bowel sounds normal; no masses,  no organomegaly  Extremities: extremities normal, atraumatic, no cyanosis or edema  Neurologic: Mental status: sleeping but arouses--c/o cant move my legs but is moving them as she speaks     Admission on 06/14/2021   Component Date Value Ref Range Status    Ventricular Rate 06/14/2021 66  BPM Final    Atrial Rate 06/14/2021 66  BPM Final    P-R Interval 06/14/2021 162  ms Final    QRS Duration 06/14/2021 74  ms Final    Q-T Interval 06/14/2021 378  ms Final    QTc Calculation (Bazett) 06/14/2021 396  ms Final    P Axis 06/14/2021 54  degrees Final    R Axis 06/14/2021 -7  degrees Final    T Axis 06/14/2021 -2  degrees Final    Diagnosis 06/14/2021 Normal sinus rhythmLow voltage QRS in precordial leadsCannot rule out Anterior infarct (cited on or before 09-OCT-2020)Abnormal ECGWhen compared with ECG of 09-OCT-2020 18:02,Possible lead misplacement in precordial leads (V2).  Confirmed by Kerry HAYWOOD MD ([id number]) on 6/15/2021 9:06:35 AM   Final    WBC 06/14/2021 3.9* 4.0 - 11.0 K/uL Final    RBC 06/14/2021 4.52  4.00 - 5.20 M/uL Final    Hemoglobin 06/14/2021 14.1  12.0 - 16.0 g/dL Final    Hematocrit 06/14/2021 41.6  36.0 - 48.0 % Final    MCV 06/14/2021 92.0  80.0 - 100.0 fL Final    MCH 06/14/2021 31.1  26.0 - 34.0 pg Final    MCHC 06/14/2021 33.8  31.0 - 36.0 g/dL Final    RDW 06/14/2021 13.3  12.4 - 15.4 % Final    Platelets 28/52/7372 203  135 - 450 K/uL Final    MPV 06/14/2021 8.2  5.0 - 10.5 fL Final    Neutrophils % 06/14/2021 67.1  % Final    Lymphocytes % 06/14/2021 21.3  % Final    Monocytes % 06/14/2021 10.6  % Final    Eosinophils % 06/14/2021 0.7  % Final    Basophils % 06/14/2021 0.3  % Final    Neutrophils Absolute 06/14/2021 2.6  1.7 - 7.7 K/uL Final    Lymphocytes Absolute 06/14/2021 0.8* 1.0 - 5.1 K/uL Final    Monocytes Absolute 06/14/2021 0.4  0.0 - 1.3 K/uL Final    Eosinophils Absolute 06/14/2021 0.0  0.0 - 0.6 K/uL Final    Basophils Absolute 06/14/2021 molecular  assay. Negative results do not preclude SARS-CoV-2 infection and  should not be used as the sole basis for patient management decisions. This test has been authorized by the FDA under an Emergency Use  Authorization (EUA) for use by authorized laboratories.     Fact sheet for Healthcare Providers:  Damaso.fi  Fact sheet for Patients: BagFit.fi    METHODOLOGY: Isothermal Nucleic Acid Amplification      Troponin 06/14/2021 <0.01  <0.01 ng/mL Final    Methodology by Troponin T    Ventricular Rate 06/14/2021 65  BPM Final    Atrial Rate 06/14/2021 65  BPM Final    P-R Interval 06/14/2021 172  ms Final    QRS Duration 06/14/2021 64  ms Final    Q-T Interval 06/14/2021 384  ms Final    QTc Calculation (Bazett) 06/14/2021 399  ms Final    P Axis 06/14/2021 35  degrees Final    R Axis 06/14/2021 -13  degrees Final    T Axis 06/14/2021 -9  degrees Final    Diagnosis 06/14/2021 Normal sinus rhythmLow voltage QRS in precordial leadsPossible Inferior infarct , age undeterminedCannot rule out Anterior infarct (cited on or before 09-OCT-2020)Nonspecific T wave abnormalityAbnormal ECGWhen compared with ECG of 14-JUN-2021 10:06,No significant change was foundConfirmed by CHASTITY RENO, Martin Bowling ([id number]) on 6/15/2021 9:07:31 AM   Final    WBC 06/15/2021 5.0  4.0 - 11.0 K/uL Final    RBC 06/15/2021 4.15  4.00 - 5.20 M/uL Final    Hemoglobin 06/15/2021 13.4  12.0 - 16.0 g/dL Final    Hematocrit 06/15/2021 38.2  36.0 - 48.0 % Final    MCV 06/15/2021 92.1  80.0 - 100.0 fL Final    MCH 06/15/2021 32.2  26.0 - 34.0 pg Final    MCHC 06/15/2021 35.0  31.0 - 36.0 g/dL Final    RDW 06/15/2021 13.2  12.4 - 15.4 % Final    Platelets 75/17/4588 191  135 - 450 K/uL Final    MPV 06/15/2021 8.8  5.0 - 10.5 fL Final    Sodium 06/15/2021 139  136 - 145 mmol/L Final    Potassium 06/15/2021 3.8  3.5 - 5.1 mmol/L Final    Chloride 06/15/2021 103  99 - 110 mmol/L Negative Final    pH, UA 06/16/2021 5.0  5.0 - 8.0 Final    Protein, UA 06/16/2021 Negative  Negative mg/dL Final    Urobilinogen, Urine 06/16/2021 0.2  <2.0 E.U./dL Final    Nitrite, Urine 06/16/2021 Negative  Negative Final    Leukocyte Esterase, Urine 06/16/2021 Negative  Negative Final    Microscopic Examination 06/16/2021 Not Indicated   Final    Urine Type 06/16/2021 NotGiven   Final    WBC, UA 06/16/2021 0-2  0 - 5 /HPF Final    RBC, UA 06/16/2021 0-2  0 - 4 /HPF Final    Epithelial Cells, UA 06/16/2021 0-1  0 - 5 /HPF Final         Assessment & Plan:   Principal Problem:    Blurred vision, bilateral--stable   Active Problems:    Dizziness--improved     Pain of right thigh--m/s     Pure hypercholesterolemia    Essential hypertension  Resolved Problems:    * No resolved hospital problems.  *  Anxiety seems to be her major sx--soc serv looking into [facility] at [facility]--Ok for transfer once psyche sees her and has reccs for rx --lindsay Lisa by phone this AM   Psyche has seen her--recc cont lorazepam--now awitng acceptance to [facility]--DS dictaTED   Please note that over 35 minutes was spent in evaluating the patient, review of records and results, discussion with staff/family, etc.    Rashmi Tucker MD

## 2021-06-18 NOTE — DISCHARGE INSTR - COC
Continuity of Care Form    Patient Name: Andrey Whipple   :  3/11/1929  MRN:  [id number]    Admit date:  2021  Discharge date:  2021    Code Status Order: Full Code   Advance Directives:   Advance Care Flowsheet Documentation       Date/Time Healthcare Directive Type of Healthcare Directive Copy in [address] Agent's Name Healthcare Agent's Phone Number    21   --  --  No, copy requested from family  Healthcare power of   --  --    21 1528  Yes, patient has an advance directive for healthcare treatment  Durable power of  for health care;Living will  --  --  --  --            Admitting Physician:  Jayne Tapia MD  PCP: Alba Ferguson MD    Discharging Nurse: Stephen Narayanan [facility] Unit/Room#: C2F-8213/4414-96  Discharging Unit Phone Number: [phone]    Emergency Contact:   Extended Emergency Contact Information  Primary Emergency Contact:  Road of [address] [place] [address] Phone: [phone]  Relation: Child  [facility] Phone: [phone]  Relation: Other    Past Surgical History:  Past Surgical History:   Procedure Laterality Date    CATARACT REMOVAL Bilateral     COLONOSCOPY      2009    HERNIA REPAIR Left 2018    repair of ventral hernia with mesh    HERNIA REPAIR Right 2020    OPEN RIGHT INGUINAL HERNIA REPAIR WITH MESH performed by Moni Jackson MD at [address]      rt 2009   dr Batsheva Latham   [address] [place] [address], 1984 left.     x3       Immunization History:   Immunization History   Administered Date(s) Administered    COVID-19, Moderna, PF, 100mcg/0.5mL 2021, 2021    Influenza 2011, 10/25/2013    Influenza Vaccine, unspecified formulation 10/05/2015    Influenza Virus Vaccine 2008, 2010, 10/20/2014    Influenza Whole 10/19/2010    Influenza, High Dose (Fluzone 65 yrs and older) 11/01/2017, 09/05/2018    Influenza, Natalie Farideh, IM, PF (6 mo and older Fluzone, Flulaval, Fluarix, and 3 yrs and older Afluria) 11/09/2016    Influenza, Quadv, adjuvanted, 65 yrs +, IM, PF (Fluad) 10/20/2020    Influenza, Triv, inactivated, subunit, adjuvanted, IM (Fluad 65 yrs and older) 11/15/2019    Pneumococcal Conjugate 13-valent ([id number]) 03/14/2016    Pneumococcal Polysaccharide ([id number]) 12/07/2010    Tdap (Boostrix, Adacel) 10/21/2015    Zoster Live (Zostavax) 12/07/2010, 05/11/2011       Active Problems:  Patient Active Problem List   Diagnosis Code    Glaucoma H40.9    Osteopenia M85.80    Esophageal reflux K21.9    Pure hypercholesterolemia E78.00    Anxiety F41.9    Essential hypertension I10    Weakness R53.1    Primary osteoarthritis of left knee M17.12    Ventral hernia without obstruction or gangrene K43.9    Abnormal glucose R73.09    Dysuria R30.0    Dizziness R42    Abdominal lump R19.00    Precordial pain R07.2    Blurred vision, bilateral H53.8    Pain of right thigh M79.651       Isolation/Infection:   Isolation            No Isolation          Patient Infection Status       Infection Onset Added Last Indicated Last Indicated By Review Planned Expiration Resolved Resolved By    None active    Resolved    COVID-19 Rule Out 06/14/21 06/14/21 06/14/21 COVID-19, Rapid (Ordered)   06/14/21 Rule-Out Test Resulted    COVID-19 Rule Out 05/29/20 05/29/20 05/29/20 COVID-19 Ambulatory (Ordered)   06/01/20 Rule-Out Test Resulted            Nurse Assessment:  Last Vital Signs: /74   Pulse 57   Temp 97.8 °F (36.6 °C) (Oral)   Resp 18   Ht 5' (1.524 m)   Wt 139 lb 12.4 oz (63.4 kg)   SpO2 96%   BMI 27.30 kg/m²     Last documented pain score (0-10 scale): Pain Level: 0  Last Weight:   Wt Readings from Last 1 Encounters:   06/18/21 139 lb 12.4 oz (63.4 kg)     Mental Status:  oriented and alert    IV Access:  - None    Nursing Mobility/ADLs:  Walking   Assisted  Transfer  Assisted  Bathing  Assisted  Dressing  Assisted  Toileting  Assisted  Feeding  Independent  Med Admin  Assisted  Med Delivery   whole    Wound Care Documentation and Therapy:        Elimination:  Continence:   · Bowel: Yes  · Bladder: Yes  Urinary Catheter: None   Colostomy/Ileostomy/Ileal Conduit: No       Date of Last BM: 6/21/2021    Intake/Output Summary (Last 24 hours) at 6/18/2021 1356  Last data filed at 6/18/2021 1028  Gross per 24 hour   Intake 420 ml   Output 300 ml   Net 120 ml     I/O last 3 completed shifts: In: 740 [P.O.:730; I.V.:10]  Out: 300 [Urine:300]    Safety Concerns: At Risk for Falls    Impairments/Disabilities:      Vision    Nutrition Therapy:  Current Nutrition Therapy:   - Oral Diet:  No Salt Added    Routes of Feeding: Oral  Liquids: Thin Liquids  Daily Fluid Restriction: no  Last Modified Barium Swallow with Video (Video Swallowing Test): not done    Treatments at the Time of Hospital Discharge:   Respiratory Treatments: N/A  Oxygen Therapy:  is not on home oxygen therapy.   Ventilator:    - No ventilator support    Rehab Therapies: Physical Therapy and Occupational Therapy  Weight Bearing Status/Restrictions: No weight bearing restirctions  Other Medical Equipment (for information only, NOT a DME order):  walker and bath bench  Other Treatments: N/A    Patient's personal belongings (please select all that are sent with patient):  Polina Augustin RN SIGNATURE:  Electronically signed by Patt Gray RN on 6/22/21 at 1:22 PM EDT    CASE MANAGEMENT/SOCIAL WORK SECTION    Inpatient Status Date: ***    Readmission Risk Assessment Score:  Readmission Risk              Risk of Unplanned Readmission:  0           Discharging to Facility/ Agency   · Name: [facility]  ·   · Address:[address] [place] [address]  · ATEBM:[phone]  · WYT:[phone]    Dialysis Facility (if applicable) · Name:  · Address:  · Dialysis Schedule:  · Phone:  · Fax:    / signature: Electronically signed by SLOAN Portillo on 6/22/2021 at 10:11 AM    PHYSICIAN SECTION    Prognosis: Good    Condition at Discharge: Stable    Rehab Potential (if transferring to Rehab): Good    Recommended Labs or Other Treatments After Discharge: pt/ot     Physician Certification: I certify the above information and transfer of Danny Pelletier  is necessary for the continuing treatment of the diagnosis listed and that she requires [place] for less 30 days.      Update Admission H&P: No change in H&P    PHYSICIAN SIGNATURE:  Electronically signed by Bhavna Douglas MD on 6/18/21 at 1:58 PM EDT

## 2021-06-18 NOTE — CARE COORDINATION
Case Management:  Rita Kellogg is reviewing case and will let cm know their decision, will need insurance authorization. Electronically signed by Beto Avery on 6/18/2021 at 11:59 AM     Case Management:  Rita Kellogg accepted pre cert was started.   Electronically signed by Beto Avery on 6/18/2021 at 4:45 PM

## 2021-06-19 PROCEDURE — 99232 SBSQ HOSP IP/OBS MODERATE 35: CPT | Performed by: INTERNAL MEDICINE

## 2021-06-19 PROCEDURE — 94760 N-INVAS EAR/PLS OXIMETRY 1: CPT

## 2021-06-19 PROCEDURE — G0378 HOSPITAL OBSERVATION PER HR: HCPCS

## 2021-06-19 PROCEDURE — 6360000002 HC RX W HCPCS: Performed by: INTERNAL MEDICINE

## 2021-06-19 PROCEDURE — 2580000003 HC RX 258: Performed by: INTERNAL MEDICINE

## 2021-06-19 PROCEDURE — 6370000000 HC RX 637 (ALT 250 FOR IP): Performed by: INTERNAL MEDICINE

## 2021-06-19 PROCEDURE — 96372 THER/PROPH/DIAG INJ SC/IM: CPT

## 2021-06-19 RX ADMIN — LORAZEPAM 0.5 MG: 0.5 TABLET ORAL at 14:43

## 2021-06-19 RX ADMIN — SODIUM CHLORIDE, PRESERVATIVE FREE 5 ML: 5 INJECTION INTRAVENOUS at 14:40

## 2021-06-19 RX ADMIN — PANTOPRAZOLE SODIUM 40 MG: 40 TABLET, DELAYED RELEASE ORAL at 06:27

## 2021-06-19 RX ADMIN — POTASSIUM CHLORIDE 10 MEQ: 750 TABLET, FILM COATED, EXTENDED RELEASE ORAL at 09:04

## 2021-06-19 RX ADMIN — ASPIRIN 81 MG: 81 TABLET, CHEWABLE ORAL at 09:04

## 2021-06-19 RX ADMIN — LATANOPROST 1 DROP: 50 SOLUTION OPHTHALMIC at 22:29

## 2021-06-19 RX ADMIN — LORAZEPAM 0.5 MG: 0.5 TABLET ORAL at 09:03

## 2021-06-19 RX ADMIN — AMLODIPINE BESYLATE 2.5 MG: 5 TABLET ORAL at 09:03

## 2021-06-19 RX ADMIN — ESTRADIOL 2 G: 0.1 CREAM VAGINAL at 22:29

## 2021-06-19 RX ADMIN — ENOXAPARIN SODIUM 30 MG: 30 INJECTION SUBCUTANEOUS at 09:03

## 2021-06-19 RX ADMIN — METOPROLOL SUCCINATE 25 MG: 25 TABLET, EXTENDED RELEASE ORAL at 09:05

## 2021-06-19 RX ADMIN — FUROSEMIDE 20 MG: 20 TABLET ORAL at 09:03

## 2021-06-19 RX ADMIN — ESCITALOPRAM OXALATE 5 MG: 10 TABLET ORAL at 09:04

## 2021-06-19 RX ADMIN — LORAZEPAM 0.5 MG: 0.5 TABLET ORAL at 22:30

## 2021-06-19 ASSESSMENT — PAIN SCALES - GENERAL
PAINLEVEL_OUTOF10: 0
PAINLEVEL_OUTOF10: 0
PAINLEVEL_OUTOF10: 1

## 2021-06-19 ASSESSMENT — PAIN DESCRIPTION - PAIN TYPE: TYPE: CHRONIC PAIN

## 2021-06-19 NOTE — PROGRESS NOTES
[facility] Progress Note  6/19/2021 8:54 AM  Subjective:   Admit Date: 6/14/2021      Chief Complaint: My feet are cold    Interval History:     Discharge to [facility] pending based on insurance approval.  This morning the patient is sitting up in bed complaining of cold feet and weak legs. No other specific complaints. She is also very worried about her house and whether her basement is flooding in this heavy rain. No other new problems noted today. Diet: ADULT DIET; Regular; No Added Salt (3-4 gm)  Medications:   Scheduled Meds:   LORazepam  0.5 mg Oral BID    estradiol  2 g Vaginal Daily    escitalopram  5 mg Oral Daily    amLODIPine  2.5 mg Oral Daily    aspirin  81 mg Oral Daily    furosemide  20 mg Oral Daily    latanoprost  1 drop Both Eyes Nightly    metoprolol succinate  25 mg Oral Daily    PreserVision AREDS 2  2 capsule Oral Daily    pantoprazole  40 mg Oral QAM AC    potassium chloride  10 mEq Oral Daily    sodium chloride flush  5-40 mL Intravenous 2 times per day    enoxaparin  30 mg Subcutaneous Daily     Continuous Infusions:   sodium chloride         Review of Systems -   No chest pain or shortness breath. No cough or sputum. No nausea, vomiting, diarrhea. No abdominal pain. No dysuria. The remainder of the review of systems is negative. Objective:   Vitals: /70   Pulse 56   Temp 97.7 °F (36.5 °C) (Oral)   Resp 15   Ht 5' (1.524 m)   Wt 139 lb 15.9 oz (63.5 kg)   SpO2 99%   BMI 27.34 kg/m²   General appearance: alert and cooperative with exam  HEENT: Head: NCAT, MMM  Neck: No cervical adenopathy. No carotid bruit. Lungs: Respirations are easy. Lungs are clear throughout, no wheezes or rhonchi or crackles. Heart: RRR, no murmur or rub or gallop  Abdomen: BS+, soft, NTND  Extremities: No  CCET, pedal pulses 2+ bilaterally. Neurologic: Alert. Conversant. No obvious cranial nerve deficits.     No labs to review in the last 24 hours    Assessment & Plan:   Principal Problem:    Blurred vision, bilateral--does not appear to have any additional symptoms  Active Problems:    Dizziness--no complaints of dizziness today. Essential hypertension-blood pressure is controlled. Anxiety -continue as needed lorazepam.    [facility]  Awaiting insurance approval.    Time > 25 minutes reviewing chart and patient data, examining and interviewing patient, and discussing with nursing staff, family, etc.      Red Lozoya MD

## 2021-06-19 NOTE — CARE COORDINATION
I contacted [address] at [phone] and pre cert is still pending as of 426 pm.   Electronically signed by SLOAN Emerson on 6/19/2021 at 4:26 PM

## 2021-06-19 NOTE — PLAN OF CARE
Problem: Falls - Risk of:  Goal: Will remain free from falls  Description: Will remain free from falls  6/19/2021 0230 by Adi Queen RN  Outcome: Ongoing     Problem: Falls - Risk of:  Goal: Absence of physical injury  Description: Absence of physical injury  6/19/2021 0230 by Adi Queen RN  Outcome: Ongoing     Problem: SAFETY  Goal: Free from accidental physical injury  6/19/2021 0230 by Adi Queen RN  Outcome: Ongoing     Problem: DAILY CARE  Goal: Daily care needs are met  6/19/2021 0230 by Adi Queen RN  Outcome: Ongoing     Problem: PAIN  Goal: Patient's pain/discomfort is manageable  6/19/2021 0230 by Adi Queen RN  Outcome: Ongoing     Problem: SKIN INTEGRITY  Goal: Skin integrity is maintained or improved  6/19/2021 0230 by Adi Queen RN  Outcome: Ongoing     Problem: KNOWLEDGE DEFICIT  Goal: Patient/S.O. demonstrates understanding of disease process, treatment plan, medications, and discharge instructions.   6/19/2021 0230 by Adi Queen RN  Outcome: Ongoing     Problem: DISCHARGE BARRIERS  Goal: Patient's continuum of care needs are met  6/19/2021 0230 by Adi Queen RN  Outcome: Ongoing

## 2021-06-19 NOTE — CARE COORDINATION
Aware of dc order, following for precert approval hopefully today for pt to go to [facility]. Electronically signed by [id number] SLOAN Garza on 6/19/2021 at 11:32 AM  SW called [facility] asking for update on precert about 709 pm.  Still pending.    Electronically signed by [id number] SLOAN Garza on 6/19/2021 at 3:54 PM

## 2021-06-19 NOTE — DISCHARGE SUMMARY
[address] [place] [address] [facility]                               DIS[facility]GE SUMMARY    PATIENT NAME: Clif Salazar                 :        1929  MED REC NO:   [id number]                          ROOM:       5116  ACCOUNT NO:   [de-identified]                           ADMIT DATE: 2021  PROVIDER:     Prema Marquez MD                  DISCHARGE DATE:    DISCHARGE DATE:  2021    She is going to [facility]. DIAGNOSIS ON ADMISSION:  Bilateral blurred vision. DIAGNOSES ON DISCHARGE:  1.  Bilateral blurred vision. 2.  Right thigh pain. 3.  Essential hypertension. 4.  Anxiety disorder. 5.  Hypercholesterolemia. HISTORY OF PRESENT ILLNESS:  The patient is a 80-year-old white female  admitted through emergency. She presented with a history of \"not  feeling right. \"  When pressed for detail, she said her vision was  blurry. It was on both eyes. She was slightly short of breath and she  presented to ER. Because of the dizziness and vision changes, stroke  team was consulted. They did not felt treatment was warranted. CT and  then MRI of the brain was done, failed to reveal any acute lesion. She  was admitted. She was given cranial and neuro checks and they remained  stable. She was seen in neurologic consultation. Neuro felt that she  could have some posterior circulation symptoms and they recommended a  repeat MRI of the brain which was performed and it did not show any  acute findings. The MRI of the brain did show a calcified meningioma  which had been there and unchanged and also showed evidence of prior  lacunar strokes but no acute strokes. Through the hospital course, she  complained on a daily basis of variety of somatic complaints.   Near  discharge, she was complaining to the nurses that she could not move her  legs but yet when they went in there, she was able to move her legs and  arms without difficulty. She was increasingly anxious, given doses of  lorazepam which did seem to help. In the hospital, her lorazepam was  p.r.n. At home, she was taking it twice a day and p.r.n. Her vital  signs remained stable. She also had an MRI of her lumbar spine that  showed degenerative disk disease and it was thought that some of her  thigh pain could be musculoskeletal in nature and PT and OT was ordered  and unclear if she will participate or not. Her T24 and folic acid  levels were normal.  Hemoglobin is 13.4. Near discharge, BUN and  creatinine 14 and 0.6, sodium 139, potassium 3.8. She also had portable  chest x-ray that showed no acute cardiopulmonary findings. She was seen  by the psychiatric nurse practitioner, Lakesha Caraballo, on 06/18 as she  continued to awaken in the morning and put the nursing light on and be  just _____ herself with the fact that she said she could not move her  arms and legs but yet she was moving them. Felt that anxiety played a  significant component. The psychiatric nurse felt that she should  continue her lorazepam and that she would be potentially a candidate for  psychotherapy, PT and OT as an outpatient. The patient initially was  refusing but then they agreed to [facility]. We will try  to get her a short stay there. She had PT and OT which had seen her on  a daily basis. She was able to function. Her AM-PAC score was 20/24  for both. So following some improvement, she is going to be discharged  to [facility] tentatively today, on 06/18, if accepted. At the time of her discharge, her meds will include Lexapro 5 mg daily  which is a new medication, lorazepam 0.5 mg b.i.d. at 1 a.m. and 10 p.m.  and then q.8 hours p.r.n., Estrace vaginal cream 2 gm intravaginally  daily, pantoprazole 40 mg daily, Lasix 20 mg daily, metoprolol XL 25  daily, Klor-Con 10 mEq daily, amlodipine 2.5 mg daily, ibuprofen 400  q.i.d. p.r.n. arthritis pain, aspirin 81 mg daily, probiotic one daily,  and eye drops as before admission. She will be followed there by Dr. Jenni Oliveira.         Rosi Chapin MD    D: 06/18/2021 14:31:35       T: 06/18/2021 19:24:45     [email or web address]  Job#: [id number]     Doc#: [id number]    CC:  [facility]

## 2021-06-20 PROCEDURE — G0378 HOSPITAL OBSERVATION PER HR: HCPCS

## 2021-06-20 PROCEDURE — 96372 THER/PROPH/DIAG INJ SC/IM: CPT

## 2021-06-20 PROCEDURE — 94760 N-INVAS EAR/PLS OXIMETRY 1: CPT

## 2021-06-20 PROCEDURE — 99231 SBSQ HOSP IP/OBS SF/LOW 25: CPT | Performed by: INTERNAL MEDICINE

## 2021-06-20 PROCEDURE — 6360000002 HC RX W HCPCS: Performed by: INTERNAL MEDICINE

## 2021-06-20 PROCEDURE — 6370000000 HC RX 637 (ALT 250 FOR IP): Performed by: INTERNAL MEDICINE

## 2021-06-20 PROCEDURE — 2580000003 HC RX 258: Performed by: INTERNAL MEDICINE

## 2021-06-20 RX ADMIN — LORAZEPAM 0.5 MG: 0.5 TABLET ORAL at 13:40

## 2021-06-20 RX ADMIN — FUROSEMIDE 20 MG: 20 TABLET ORAL at 08:47

## 2021-06-20 RX ADMIN — ESCITALOPRAM OXALATE 5 MG: 10 TABLET ORAL at 08:45

## 2021-06-20 RX ADMIN — AMLODIPINE BESYLATE 2.5 MG: 5 TABLET ORAL at 08:46

## 2021-06-20 RX ADMIN — SODIUM CHLORIDE, PRESERVATIVE FREE 10 ML: 5 INJECTION INTRAVENOUS at 08:50

## 2021-06-20 RX ADMIN — ENOXAPARIN SODIUM 30 MG: 30 INJECTION SUBCUTANEOUS at 08:48

## 2021-06-20 RX ADMIN — ESTRADIOL 2 G: 0.1 CREAM VAGINAL at 21:35

## 2021-06-20 RX ADMIN — ASPIRIN 81 MG: 81 TABLET, CHEWABLE ORAL at 08:45

## 2021-06-20 RX ADMIN — METOPROLOL SUCCINATE 25 MG: 25 TABLET, EXTENDED RELEASE ORAL at 08:47

## 2021-06-20 RX ADMIN — PANTOPRAZOLE SODIUM 40 MG: 40 TABLET, DELAYED RELEASE ORAL at 06:16

## 2021-06-20 RX ADMIN — POTASSIUM CHLORIDE 10 MEQ: 750 TABLET, FILM COATED, EXTENDED RELEASE ORAL at 08:45

## 2021-06-20 RX ADMIN — SODIUM CHLORIDE, PRESERVATIVE FREE 10 ML: 5 INJECTION INTRAVENOUS at 21:31

## 2021-06-20 RX ADMIN — LATANOPROST 1 DROP: 50 SOLUTION OPHTHALMIC at 21:31

## 2021-06-20 RX ADMIN — LORAZEPAM 0.5 MG: 0.5 TABLET ORAL at 21:31

## 2021-06-20 ASSESSMENT — PAIN SCALES - GENERAL
PAINLEVEL_OUTOF10: 0

## 2021-06-20 NOTE — CARE COORDINATION
Case Management:  [facility] and [facility]. Electronically signed by Deidra Parson on 2021 at 12:12 PM     Case Management:  Received call from [facility] that their physician wants to talk to the physician for this patient before he makes a decision. They gave a deadline of 21 by 12 pm EST to complete the peer to peer. Notified Dr. Lucina Helm and he will pass the information on to  Dr. Daily Simon as she is his primary patient. Contact information to complete the peer to peer is [phone] op 3 must use patient full name and  as identifies but if needed reference number is [id number].   Electronically signed by Deidra Parson on 2021 at 1:54 PM

## 2021-06-20 NOTE — PROGRESS NOTES
Libby DARNELL [facility] Progress Note  6/20/2021 9:33 AM  Subjective:   Admit Date: 6/14/2021      Chief Complaint: When can I leave? Interval History:     Discharged to [facility] still pending on insurance precertification. This morning the patient is sitting up in bed eating breakfast.  She is lucid, alert, and states she feels okay. She was very conversant and feeling well. Diet: ADULT DIET; Regular; No Added Salt (3-4 gm)  Medications:   Scheduled Meds:   LORazepam  0.5 mg Oral BID    estradiol  2 g Vaginal Daily    escitalopram  5 mg Oral Daily    amLODIPine  2.5 mg Oral Daily    aspirin  81 mg Oral Daily    furosemide  20 mg Oral Daily    latanoprost  1 drop Both Eyes Nightly    metoprolol succinate  25 mg Oral Daily    PreserVision AREDS 2  2 capsule Oral Daily    pantoprazole  40 mg Oral QAM AC    potassium chloride  10 mEq Oral Daily    sodium chloride flush  5-40 mL Intravenous 2 times per day    enoxaparin  30 mg Subcutaneous Daily     Continuous Infusions:   sodium chloride         Review of Systems -   No chest pain or shortness breath. No cough or sputum. No nausea, vomiting, diarrhea. No abdominal pain. No dysuria. The remainder of the review of systems is negative. Objective:   Vitals: /74   Pulse 71   Temp 98 °F (36.7 °C) (Oral)   Resp 18   Ht 5' (1.524 m)   Wt 140 lb 6.9 oz (63.7 kg)   SpO2 98%   BMI 27.43 kg/m²    Exam unchanged today:  General appearance: alert and cooperative with exam  HEENT: Head: NCAT, MMM  Neck: No cervical adenopathy. No carotid bruit. Lungs: Respirations are easy. Lungs are clear throughout, no wheezes or rhonchi or crackles. Heart: RRR, no murmur or rub or gallop  Abdomen: BS+, soft, NTND  Extremities: No CCET, pedal pulses 2+ bilaterally. Neurologic: Alert. Conversant. No obvious cranial nerve deficits.     No labs to review in the last 24 hours    Assessment & Plan:   Principal Problem:    Blurred vision, bilateral--does not appear to have any additional symptoms. Stable today. Active Problems:    Dizziness--no complaints of dizziness today. Feels well. Denies any problems    Essential hypertension-blood pressure is controlled. No change in the regimen as needed. Anxiety -continue as needed lorazepam.  No anxiety apparent today. Disposition-discharge to [facility] pending.  Awaiting insurance approval.       Christine Grajeda MD

## 2021-06-20 NOTE — PLAN OF CARE
Problem: Falls - Risk of:  Goal: Will remain free from falls  Description: Will remain free from falls  6/20/2021 0939 by Edwina Valencia RN  Outcome: Ongoing  Note: Fall risk assessment completed per unit protocol. Patient's bed is in the lowest position, call light is within reach and the patient's room is free of clutter. The patient has been instructed to call for assistance before getting out of bed or the chair. 6/20/2021 0506 by Laureano Hwang RN  Outcome: Ongoing  Goal: Absence of physical injury  Description: Absence of physical injury  6/20/2021 0939 by Edwina Valencia RN  Outcome: Ongoing  6/20/2021 0506 by Laureano Hwang RN  Outcome: Ongoing     Problem: SAFETY  Goal: Free from accidental physical injury  6/20/2021 0939 by Edwina Valencia RN  Outcome: Ongoing  Note: Patient educated on unit safety measures. Bed alarm/chair alarm and using the call light before getting up. Patient belongings placed close by and are accessible to patient. Patient was educated on how to use call light. Patient verbalized understanding of when to use the call light. Hourly rounding in use. Will continue to monitor. 6/20/2021 0506 by Laureano Hwang RN  Outcome: Ongoing     Problem: DAILY CARE  Goal: Daily care needs are met  6/20/2021 0939 by Edwina Valencia RN  Outcome: Ongoing  6/20/2021 0506 by Laureano Hwang RN  Outcome: Ongoing     Problem: PAIN  Goal: Patient's pain/discomfort is manageable  6/20/2021 0939 by Edwina Valencia RN  Outcome: Ongoing  Note:   Pain/discomfort being managed with PRN analgesics per MD orders. Pt able to express presence and absence of pain and rate pain appropriately using numerical scale.      6/20/2021 0506 by Laureano Hwang RN  Outcome: Ongoing     Problem: SKIN INTEGRITY  Goal: Skin integrity is maintained or improved  6/20/2021 0939 by Ewdina Valencia RN  Outcome: Ongoing  Note: Patient's skin has been assessed per unit protocol and the patient is being repositioned or encouraged to turn every two hours to prevent skin breakdown and promote healing.     6/20/2021 0506 by Jahaira Barkley RN  Outcome: Ongoing     Problem: KNOWLEDGE DEFICIT  Goal: Patient/S.O. demonstrates understanding of disease process, treatment plan, medications, and discharge instructions. 6/20/2021 0939 by Claudine Razo RN  Outcome: Ongoing  Note: Education provided every shift.   6/20/2021 0506 by Jahaira Barkley RN  Outcome: Ongoing     Problem: DISCHARGE BARRIERS  Goal: Patient's continuum of care needs are met  6/20/2021 0939 by Claudine Razo RN  Outcome: Ongoing  6/20/2021 0506 by Jahaira Barkley RN  Outcome: Ongoing

## 2021-06-21 PROCEDURE — 6370000000 HC RX 637 (ALT 250 FOR IP): Performed by: INTERNAL MEDICINE

## 2021-06-21 PROCEDURE — 94761 N-INVAS EAR/PLS OXIMETRY MLT: CPT

## 2021-06-21 PROCEDURE — 6360000002 HC RX W HCPCS: Performed by: INTERNAL MEDICINE

## 2021-06-21 PROCEDURE — 2580000003 HC RX 258: Performed by: INTERNAL MEDICINE

## 2021-06-21 PROCEDURE — 96372 THER/PROPH/DIAG INJ SC/IM: CPT

## 2021-06-21 PROCEDURE — G0378 HOSPITAL OBSERVATION PER HR: HCPCS

## 2021-06-21 PROCEDURE — 99226 PR SBSQ OBSERVATION CARE/DAY 35 MINUTES: CPT | Performed by: INTERNAL MEDICINE

## 2021-06-21 RX ADMIN — ENOXAPARIN SODIUM 30 MG: 30 INJECTION SUBCUTANEOUS at 08:35

## 2021-06-21 RX ADMIN — LORAZEPAM 0.5 MG: 0.5 TABLET ORAL at 21:13

## 2021-06-21 RX ADMIN — PANTOPRAZOLE SODIUM 40 MG: 40 TABLET, DELAYED RELEASE ORAL at 06:43

## 2021-06-21 RX ADMIN — ASPIRIN 81 MG: 81 TABLET, CHEWABLE ORAL at 08:35

## 2021-06-21 RX ADMIN — METOPROLOL SUCCINATE 25 MG: 25 TABLET, EXTENDED RELEASE ORAL at 08:35

## 2021-06-21 RX ADMIN — ESCITALOPRAM OXALATE 5 MG: 10 TABLET ORAL at 08:35

## 2021-06-21 RX ADMIN — SODIUM CHLORIDE, PRESERVATIVE FREE 10 ML: 5 INJECTION INTRAVENOUS at 08:38

## 2021-06-21 RX ADMIN — SODIUM CHLORIDE, PRESERVATIVE FREE 10 ML: 5 INJECTION INTRAVENOUS at 21:13

## 2021-06-21 RX ADMIN — AMLODIPINE BESYLATE 2.5 MG: 5 TABLET ORAL at 08:35

## 2021-06-21 RX ADMIN — LATANOPROST 1 DROP: 50 SOLUTION OPHTHALMIC at 21:13

## 2021-06-21 RX ADMIN — FUROSEMIDE 20 MG: 20 TABLET ORAL at 08:35

## 2021-06-21 RX ADMIN — POTASSIUM CHLORIDE 10 MEQ: 750 TABLET, FILM COATED, EXTENDED RELEASE ORAL at 08:35

## 2021-06-21 RX ADMIN — LORAZEPAM 0.5 MG: 0.5 TABLET ORAL at 13:30

## 2021-06-21 ASSESSMENT — PAIN SCALES - GENERAL
PAINLEVEL_OUTOF10: 0

## 2021-06-21 NOTE — PROGRESS NOTES
Potassium reflex Magnesium 06/14/2021 3.7  3.5 - 5.1 mmol/L Final    Chloride 06/14/2021 103  99 - 110 mmol/L Final    CO2 06/14/2021 26  21 - 32 mmol/L Final    Anion Gap 06/14/2021 12  3 - 16 Final    Glucose 06/14/2021 99  70 - 99 mg/dL Final    BUN 06/14/2021 15  7 - 20 mg/dL Final    CREATININE 06/14/2021 0.7  0.6 - 1.2 mg/dL Final    GFR Non- 06/14/2021 >60  >60 Final    Comment: >60 mL/min/1.73m2 EGFR, calc. for ages 25 and older using the  MDRD formula (not corrected for weight), is valid for stable  renal function.  GFR  06/14/2021 >60  >60 Final    Comment: Chronic Kidney Disease: less than 60 ml/min/1.73 sq.m. Kidney Failure: less than 15 ml/min/1.73 sq.m. Results valid for patients 18 years and older.       Calcium 06/14/2021 9.5  8.3 - 10.6 mg/dL Final    Total Protein 06/14/2021 7.2  6.4 - 8.2 g/dL Final    Albumin 06/14/2021 4.4  3.4 - 5.0 g/dL Final    Albumin/Globulin Ratio 06/14/2021 1.6  1.1 - 2.2 Final    Total Bilirubin 06/14/2021 0.6  0.0 - 1.0 mg/dL Final    Alkaline Phosphatase 06/14/2021 63  40 - 129 U/L Final    ALT 06/14/2021 8* 10 - 40 U/L Final    AST 06/14/2021 17  15 - 37 U/L Final    Globulin 06/14/2021 2.8  g/dL Final    Color, UA 06/14/2021 YELLOW  Straw/Yellow Final    Clarity, UA 06/14/2021 Clear  Clear Final    Glucose, Ur 06/14/2021 Negative  Negative mg/dL Final    Bilirubin Urine 06/14/2021 Negative  Negative Final    Ketones, Urine 06/14/2021 Negative  Negative mg/dL Final    Specific [place], UA 06/14/2021 1.006  1.005 - 1.030 Final    Blood, Urine 06/14/2021 Negative  Negative Final    pH, UA 06/14/2021 7.0  5.0 - 8.0 Final    Protein, UA 06/14/2021 Negative  Negative mg/dL Final    Urobilinogen, Urine 06/14/2021 0.2  <2.0 E.U./dL Final    Nitrite, Urine 06/14/2021 Negative  Negative Final    Leukocyte Esterase, Urine 06/14/2021 Negative  Negative Final    Microscopic Examination 06/14/2021 Not Indicated   Final    Urine Type 06/14/2021 NotGiven   Final    Urine Reflex to Culture 06/14/2021 Not Indicated   Final    Lactic Acid, Sepsis 06/14/2021 0.7  0.4 - 1.9 mmol/L Final    Lactic Acid, Sepsis 06/14/2021 0.9  0.4 - 1.9 mmol/L Final    Blood Culture, Routine 06/14/2021 No Growth after 4 days of incubation. Final    Culture, Blood 2 06/14/2021 No Growth after 4 days of incubation. Final    Procalcitonin 06/14/2021 0.05  0.00 - 0.15 ng/mL Final    Comment: Suspected Sepsis:  Low likelihood of sepsis  <.50 ng/mL    Increased likelihood of sepsis 0.50-2.00 ng/mL  Antibiotics encouraged    High risk of sepsis/shock   >2.00 ng/mL  Antibiotics strongly encouraged    Suspected Lower Respiratory Tract Infections:  Low likelihood of bacterial infection  <0.24 ng/mL    Increased likelihood of bacterial infection >0.24 ng/mL  Antibiotics encouraged    With successful antibiotic therapy, PCT levels should decrease  rapidly. (Half-life of 24 to 36 hours.)    Procalcitonin values from samples collected within the first  6 hours of systemic infection may still be low. Retesting may be indicated. Values from day 1 and day 4 can be entered into the Change in  Procalcitonin Calculator to determine the patient's  Mortality Risk Prognosis  (www.[facility]-pct-calculator. [x+1])    In healthy neonates, plasma Procalcitonin (PCT) concentrations  increase gradually after birth, reaching peak values at about  24 hours of age then decrease to normal values below 0.5                            ng/mL  by 48-72 hours of age.  SARS-CoV-2, NAAT 06/14/2021 Not Detected  Not Detected Final    Comment: Rapid NAAT:   Negative results should be treated as presumptive and,  if inconsistent with clinical signs and symptoms or necessary for  patient management, should be tested with an alternative molecular  assay.  Negative results do not preclude SARS-CoV-2 infection and  should not be used as the sole basis for patient management decisions. This test has been authorized by the FDA under an Emergency Use  Authorization (EUA) for use by authorized laboratories.     Fact sheet for Healthcare Providers:  Benson.es  Fact sheet for Patients: BuildHer.es    METHODOLOGY: Isothermal Nucleic Acid Amplification      Troponin 06/14/2021 <0.01  <0.01 ng/mL Final    Methodology by Troponin T    Ventricular Rate 06/14/2021 65  BPM Final    Atrial Rate 06/14/2021 65  BPM Final    P-R Interval 06/14/2021 172  ms Final    QRS Duration 06/14/2021 64  ms Final    Q-T Interval 06/14/2021 384  ms Final    QTc Calculation (Bazett) 06/14/2021 399  ms Final    P Axis 06/14/2021 35  degrees Final    R Axis 06/14/2021 -13  degrees Final    T Axis 06/14/2021 -9  degrees Final    Diagnosis 06/14/2021 Normal sinus rhythmLow voltage QRS in precordial leadsPossible Inferior infarct , age undeterminedCannot rule out Anterior infarct (cited on or before 09-OCT-2020)Nonspecific T wave abnormalityAbnormal ECGWhen compared with ECG of 14-JUN-2021 10:06,No significant change was foundConfirmed by CHASTITY RENO, Consuelo Brown ([id number]) on 6/15/2021 9:07:31 AM   Final    WBC 06/15/2021 5.0  4.0 - 11.0 K/uL Final    RBC 06/15/2021 4.15  4.00 - 5.20 M/uL Final    Hemoglobin 06/15/2021 13.4  12.0 - 16.0 g/dL Final    Hematocrit 06/15/2021 38.2  36.0 - 48.0 % Final    MCV 06/15/2021 92.1  80.0 - 100.0 fL Final    MCH 06/15/2021 32.2  26.0 - 34.0 pg Final    MCHC 06/15/2021 35.0  31.0 - 36.0 g/dL Final    RDW 06/15/2021 13.2  12.4 - 15.4 % Final    Platelets 19/97/8451 191  135 - 450 K/uL Final    MPV 06/15/2021 8.8  5.0 - 10.5 fL Final    Sodium 06/15/2021 139  136 - 145 mmol/L Final    Potassium 06/15/2021 3.8  3.5 - 5.1 mmol/L Final    Chloride 06/15/2021 103  99 - 110 mmol/L Final    CO2 06/15/2021 23  21 - 32 mmol/L Final    Anion Gap 06/15/2021 13  3 - 16 Final    Glucose 06/15/2021 90  70 - 99 mg/dL Final    BUN 06/15/2021 14  7 - 20 mg/dL Final    CREATININE 06/15/2021 0.6  0.6 - 1.2 mg/dL Final    GFR Non- 06/15/2021 >60  >60 Final    Comment: >60 mL/min/1.73m2 EGFR, calc. for ages 25 and older using the  MDRD formula (not corrected for weight), is valid for stable  renal function.  GFR  06/15/2021 >60  >60 Final    Comment: Chronic Kidney Disease: less than 60 ml/min/1.73 sq.m. Kidney Failure: less than 15 ml/min/1.73 sq.m. Results valid for patients 18 years and older.  Calcium 06/15/2021 9.1  8.3 - 10.6 mg/dL Final    Vitamin B-12 06/15/2021 505  211 - 911 pg/mL Final    Folate 06/15/2021 13.05  4.78 - 24.20 ng/mL Final    Comment: Effective 11-15-16 10:00am EST  Please note reference ranges have  changed for Folate.  TSH Reflex FT4 06/15/2021 1.51  0.27 - 4.20 uIU/mL Final    Sed Rate 06/15/2021 9  0 - 30 mm/Hr Final    CRP 06/15/2021 <3.0  0.0 - 5.1 mg/L Final    Comment: WR-CRP Reference range:  30D-199Y    <5.1  <30D        Not established    CRP is used in the detection and evaluation of infection,  tissue injury, inflammatory disorders, and associated  disease. Increases in CRP values are non-specific and  should not be interpreted without a complete clinical  evaluation.  reference ranges have not been  established and values should be interpreted within clinical  context and with serial measurements, if clinically  appropriate.       Color, UA 2021 YELLOW  Straw/Yellow Final    Clarity, UA 2021 Clear  Clear Final    Glucose, Ur 2021 Negative  Negative mg/dL Final    Bilirubin Urine 2021 Negative  Negative Final    Ketones, Urine 2021 Negative  Negative mg/dL Final    Specific Gravity, UA 2021 1.015  1.005 - 1.030 Final    Blood, Urine 2021 Negative  Negative Final    pH, UA 2021 5.0  5.0 - 8.0 Final    Protein, UA 2021 Negative  Negative mg/dL Final    Urobilinogen, Urine 06/16/2021 0.2  <2.0 E.U./dL Final    Nitrite, Urine 06/16/2021 Negative  Negative Final    Leukocyte Esterase, Urine 06/16/2021 Negative  Negative Final    Microscopic Examination 06/16/2021 Not Indicated   Final    Urine Type 06/16/2021 NotGiven   Final    WBC, UA 06/16/2021 0-2  0 - 5 /HPF Final    RBC, UA 06/16/2021 0-2  0 - 4 /HPF Final    Epithelial Cells, UA 06/16/2021 0-1  0 - 5 /HPF Final         Assessment & Plan:   Principal Problem:    Blurred vision, bilateral--resolved--will schedule o/p opth eval   Active Problems:    Dizziness    Pain of right thigh--sec to lumbad DDD    Pure hypercholesterolemia    Essential hypertension--Continue current therapy    Anxiety--improved   Resolved Problems:    * No resolved hospital problems.  *  Will look to [place] to home with [facility] --  DS dictated   Please note that over 35 minutes was spent in evaluating the patient, review of records and results, discussion with staff/family, etc.    Vonda Porter MD

## 2021-06-21 NOTE — PLAN OF CARE
Problem: Falls - Risk of:  Goal: Will remain free from falls  Description: Will remain free from falls  6/21/2021 0938 by Joe Mendoza RN  Outcome: Ongoing  Note: Fall risk assessment completed per unit protocol. Patient's bed is in the lowest position, call light is within reach and the patient's room is free of clutter. The patient has been instructed to call for assistance before getting out of bed or the chair. 6/21/2021 0359 by Lizandro Crespo RN  Outcome: Ongoing  Goal: Absence of physical injury  Description: Absence of physical injury  6/21/2021 0938 by Joe Mendoza RN  Outcome: Ongoing  6/21/2021 0359 by Lizandro Crespo RN  Outcome: Ongoing     Problem: SAFETY  Goal: Free from accidental physical injury  6/21/2021 0938 by Joe Mendoza RN  Outcome: Ongoing  Note: Patient educated on unit safety measures. Bed alarm/chair alarm and using the call light before getting up. Patient belongings placed close by and are accessible to patient. Patient was educated on how to use call light. Patient verbalized understanding of when to use the call light. Hourly rounding in use. Will continue to monitor. 6/21/2021 0359 by Lizandro Crespo RN  Outcome: Ongoing     Problem: DAILY CARE  Goal: Daily care needs are met  6/21/2021 0938 by Joe Mendoza RN  Outcome: Ongoing  6/21/2021 0359 by Lizandro Crespo RN  Outcome: Ongoing     Problem: PAIN  Goal: Patient's pain/discomfort is manageable  6/21/2021 0938 by Joe Mendoza RN  Outcome: Ongoing  Note:   Pain/discomfort being managed with PRN analgesics per MD orders. Pt able to express presence and absence of pain and rate pain appropriately using numerical scale.      6/21/2021 0359 by Lizandro Crespo RN  Outcome: Ongoing     Problem: SKIN INTEGRITY  Goal: Skin integrity is maintained or improved  6/21/2021 0938 by Joe Mendoza RN  Outcome: Ongoing  Note: Patient's skin has been assessed per unit protocol and the patient is being repositioned or encouraged to turn every two hours to prevent skin breakdown and promote healing.     6/21/2021 0359 by Carolina Sales RN  Outcome: Ongoing     Problem: KNOWLEDGE DEFICIT  Goal: Patient/S.O. demonstrates understanding of disease process, treatment plan, medications, and discharge instructions.   6/21/2021 0938 by Evon Garcia RN  Outcome: Ongoing  6/21/2021 0359 by Carolina Sales RN  Outcome: Ongoing     Problem: DISCHARGE BARRIERS  Goal: Patient's continuum of care needs are met  6/21/2021 0938 by Evon Garcia RN  Outcome: Ongoing  6/21/2021 0359 by Carolina Sales RN  Outcome: Ongoing

## 2021-06-21 NOTE — DISCHARGE SUMMARY
[address] [place] [address] [facility]                               DIS[facility]    PATIENT NAME: Sharonda Moreau                 :        1929  MED REC NO:   [id number]                          ROOM:       5116  ACCOUNT NO:   [de-identified]                           ADMIT DATE: 2021  PROVIDER:     Reyes Lo MD                  DISCHARGE DATE:        ADDENDUM    DISCHARGE DATE:  2021    ADMISSION DIAGNOSIS:  Blurred vision. DISCHARGE DIAGNOSES:  1. Blurred vision. 2.  Anxiety state. 3.  Hypertension. 4.  Pain on the right thigh. 5.  Dizziness. 6.  Lumbar degenerative disk disease. HISTORY OF PRESENT ILLNESS:  The patient was admitted for several more  days past her tentative discharge of 2021. Her insurance company  would not approve her transfer over the weekend,  through 2020,  and when Dr. Karan Galicia visited her on 2021, she was able to  function with an improved state. She was markedly less anxious, she was  able to get up and move around her room with minimal assistance. The  insurance company is still refusing nursing placement without a  [id number] visit, and the patient and family are now willing to  consider discharge to home. Accordingly, she will be discharged to home  today on 2021. She will be followed with home healthcare, Dr. Karan Galicia will supervise that. DISCHARGE MEDICATIONS:  1. Lexapro 5 mg in the morning. 2.  Lorazepam 0.5 mg daily at 1 p.m. and 10 p.m., and 0.5 q.8 hours  p.r.n.  3.  Estrace vaginal cream 2 gm daily. 4.  Pantoprazole 40 mg daily. 5.  Lasix 20 mg in the morning. 6.  Metoprolol XL 25 daily. 7.  KCl 10 mEq daily. 8.  Amlodipine 2.5 mg daily. 9.  Probiotic over-the-counter. 10.  Calcium over-the-counter. FOLLOWUP:  Told to see Dr. Karan Galicia for followup care in about a week.         Calvin Ho MD    D: 06/21/2021 8:03:19       T: 06/21/2021 12:00:11     [email or web address]  Job#: [id number]     Doc#: [id number]    CC:

## 2021-06-22 VITALS
BODY MASS INDEX: 27.61 KG/M2 | OXYGEN SATURATION: 95 % | HEART RATE: 62 BPM | HEIGHT: 60 IN | DIASTOLIC BLOOD PRESSURE: 62 MMHG | WEIGHT: 140.65 LBS | RESPIRATION RATE: 16 BRPM | SYSTOLIC BLOOD PRESSURE: 134 MMHG | TEMPERATURE: 97.4 F

## 2021-06-22 PROCEDURE — 99217 PR OBSERVATION CARE DISCHARGE MANAGEMENT: CPT | Performed by: INTERNAL MEDICINE

## 2021-06-22 PROCEDURE — 97530 THERAPEUTIC ACTIVITIES: CPT

## 2021-06-22 PROCEDURE — 6360000002 HC RX W HCPCS: Performed by: INTERNAL MEDICINE

## 2021-06-22 PROCEDURE — 97535 SELF CARE MNGMENT TRAINING: CPT

## 2021-06-22 PROCEDURE — 6370000000 HC RX 637 (ALT 250 FOR IP): Performed by: INTERNAL MEDICINE

## 2021-06-22 PROCEDURE — 97530 THERAPEUTIC ACTIVITIES: CPT | Performed by: PHYSICAL THERAPIST

## 2021-06-22 PROCEDURE — 2580000003 HC RX 258: Performed by: INTERNAL MEDICINE

## 2021-06-22 PROCEDURE — 96372 THER/PROPH/DIAG INJ SC/IM: CPT

## 2021-06-22 PROCEDURE — 94761 N-INVAS EAR/PLS OXIMETRY MLT: CPT

## 2021-06-22 PROCEDURE — 97116 GAIT TRAINING THERAPY: CPT | Performed by: PHYSICAL THERAPIST

## 2021-06-22 PROCEDURE — G0378 HOSPITAL OBSERVATION PER HR: HCPCS

## 2021-06-22 RX ORDER — LORAZEPAM 0.5 MG/1
0.5 TABLET ORAL EVERY 8 HOURS PRN
Qty: 60 TABLET | Refills: 0 | Status: ON HOLD
Start: 2021-06-22 | End: 2021-07-01 | Stop reason: HOSPADM

## 2021-06-22 RX ORDER — LORAZEPAM 0.5 MG/1
0.5 TABLET ORAL 2 TIMES DAILY
Qty: 30 TABLET | Refills: 0 | Status: ON HOLD
Start: 2021-06-22 | End: 2021-07-01 | Stop reason: HOSPADM

## 2021-06-22 RX ADMIN — ESCITALOPRAM OXALATE 5 MG: 10 TABLET ORAL at 09:15

## 2021-06-22 RX ADMIN — PANTOPRAZOLE SODIUM 40 MG: 40 TABLET, DELAYED RELEASE ORAL at 06:19

## 2021-06-22 RX ADMIN — SODIUM CHLORIDE, PRESERVATIVE FREE 10 ML: 5 INJECTION INTRAVENOUS at 09:15

## 2021-06-22 RX ADMIN — AMLODIPINE BESYLATE 2.5 MG: 5 TABLET ORAL at 09:16

## 2021-06-22 RX ADMIN — LORAZEPAM 0.5 MG: 0.5 TABLET ORAL at 14:54

## 2021-06-22 RX ADMIN — POTASSIUM CHLORIDE 10 MEQ: 750 TABLET, FILM COATED, EXTENDED RELEASE ORAL at 09:15

## 2021-06-22 RX ADMIN — METOPROLOL SUCCINATE 25 MG: 25 TABLET, EXTENDED RELEASE ORAL at 09:16

## 2021-06-22 RX ADMIN — FUROSEMIDE 20 MG: 20 TABLET ORAL at 09:16

## 2021-06-22 RX ADMIN — ENOXAPARIN SODIUM 30 MG: 30 INJECTION SUBCUTANEOUS at 09:15

## 2021-06-22 RX ADMIN — ASPIRIN 81 MG: 81 TABLET, CHEWABLE ORAL at 09:15

## 2021-06-22 RX ADMIN — ESTRADIOL 2 G: 0.1 CREAM VAGINAL at 10:08

## 2021-06-22 ASSESSMENT — PAIN SCALES - GENERAL: PAINLEVEL_OUTOF10: 0

## 2021-06-22 NOTE — PROGRESS NOTES
Occupational Therapy  Facility/Department: [facility] 5 PROGRESSIVE CARE  Daily Treatment Note  This note to serve as discharge summary if pt d/c'd prior to next session    NAME: José Miguel Medel  : 3/11/1929  MRN: [id number]    Date of Service: 2021    Discharge Recommendations:  Patient would benefit from continued therapy after discharge       Assessment   Performance deficits / Impairments: Decreased functional mobility ; Decreased endurance;Decreased ADL status; Decreased safe awareness;Decreased high-level IADLs;Decreased cognition;Decreased balance  Assessment: Discussed with OTR: Pt tolerated session well, completing a shower again today, with SBA. Pt dressed SBA, except very min A to don jobst hose (and reports of minimal dizziness when leaning forward). Pt amb/transferring with RW in room, SBA. Pt to be discharged today to [facility]. Treatment Diagnosis: impaired ADL and fxl mobility  Prognosis: Good  OT Education: Transfer Training;ADL Adaptive Strategies; Plan of Care;OT Role  REQUIRES OT FOLLOW UP: Yes  Activity Tolerance  Activity Tolerance: Patient Tolerated treatment well  Activity Tolerance: Reporting \"just slight\" dizziness at end of tx. \"It doesn't last\". Safety Devices  Safety Devices in place: Yes  Type of devices: Call light within reach;Nurse notified; Left in chair;Chair alarm in place         Patient Diagnosis(es): The primary encounter diagnosis was Dizziness. Diagnoses of Blurred vision, Shortness of breath, and Anxiety were also pertinent to this visit. has a past medical history of Arthritis, Blood circulation, collateral, Cataract, Clostridium difficile infection, GERD (gastroesophageal reflux disease), Glaucoma, Hyperlipidemia, Hypertension, Macular degeneration, and UTI (urinary tract infection). has a past surgical history that includes Cataract removal (Bilateral, ); Total knee arthroplasty; Hysterectomy ();  Varicose vein surgery ( leticia,  left.); Colonoscopy; hernia repair (Left, 06/08/2018); and hernia repair (Right, 6/4/2020). Restrictions  Restrictions/Precautions  Restrictions/Precautions: Fall Risk  Position Activity Restriction  Other position/activity restrictions: telemetry  Subjective   General  Chart Reviewed: Yes, Orders, Progress Notes, Labs  Patient assessed for rehabilitation services?: Yes  Additional Pertinent Hx: 79 yo female admitted 6/14 with Blurred vision- bilateral--acute onset--assoc with dizziness--MRI of brain neg, and RLE pain. PMH: R TKA, hypertension, hyperlipidemia, maculardegeneration prior history of gastroesophageal reflux and UTI. Response to previous treatment: Patient with no complaints from previous session  Family / Caregiver Present: No  Referring Practitioner: Jayne Tapia MD  Diagnosis: Bilateral blurred vision  Subjective  Subjective: Pt met BS, in bed and agreeable to OT. Pt denied pain. Pt requesting to take a shower. General Comment  Comments: RN ok to see      Orientation  Orientation  Overall Orientation Status: Within Functional Limits  Objective    ADL  Grooming: Stand by assistance (stance at sink to comb hair.)  UE Bathing: Setup  LE Bathing: Stand by assistance (seated to cross LE's to wash feet. Stance for buttocks, sanford areas)  UE Dressing: Setup  LE Dressing: Setup;Minimal assistance (dons footies, briefs, pants, socks with SBA. Assist to don jobst cora hose.)  Toileting: Stand by assistance  Additional Comments: wet towel on chair and floor for non skid surface, covered IV to waterproof        Balance  Sitting Balance: Modified independent  (at EOB)  Standing Balance  Time: 2-3 min or less  Activity: ADL's, amb in room, all SBA. Very min cues when using walker in room, for safe techn.   Functional Mobility  Functional - Mobility Device: Rolling Walker  Activity: To/from bathroom  Assist Level: Stand by assistance  Toilet Transfers  Toilet - Technique: Ambulating  Equipment Used: Grab bars  Toilet Transfer: Stand by assistance  Toilet Transfers Comments: Used RW  Shower Transfers  Shower - Transfer From: Deacon Frias - Transfer Type: To and From  Shower - Transfer To:  (arm chair in shower)  Shower Transfers: Stand by assistance  Shower Transfers Comments: Used RW  Wheelchair [employer] Transfers  Wheelchair/Bed - Technique: Ambulating  Equipment Used: Bed (recliner)  Level of Asssistance: Stand by assistance  Wheelchair Transfers Comments: RW. very min cues for safe use of walker  Bed mobility  Supine to Sit: Supervision  Sit to Supine: Unable to assess         Cognition  Overall Cognitive Status: [facility]  Cognition Comment: forgetful         Plan   Plan  Times per week: 3-5  Times per day: Daily  Current Treatment Recommendations: Strengthening, Functional Mobility Training, Patient/Caregiver Education & Training, ROM, Endurance Training, Pain Management, Balance Training, Safety Education & Training, Self-Care / ADL    AM-PAC Score        AM-[facility] Inpatient Daily Activity Raw Score: 19 (06/22/21 1124)  AM-PAC Inpatient ADL T-Scale Score : 40.22 (06/22/21 1124)  ADL Inpatient CMS 0-100% Score: 42.8 (06/22/21 1124)  ADL Inpatient CMS G-Code Modifier : CK (06/22/21 1124)    Goals  Short term goals  Time Frame for Short term goals: prior to d/c: 6/22/21: goals not met,ongoing  Short term goal 1: toileting SUP  Short term goal 2: ADL tx SUP  Short term goal 3: UB bathing/dresing SUP  Short term goal 4: LB dressing SUP  Short term goal 5:  Tolerate 5 min fxl standing task SUP  Patient Goals   Patient goals : to go home       Therapy Time   Individual Concurrent Group Co-treatment   Time In 1050         Time Out 1133         Minutes [address] [place],515

## 2021-06-22 NOTE — PLAN OF CARE
Problem: Falls - Risk of:  Goal: Will remain free from falls  Description: Will remain free from falls  6/22/2021 1011 by Kira Vasquez RN  Outcome: Ongoing     Problem: Falls - Risk of:  Goal: Absence of physical injury  Description: Absence of physical injury  6/22/2021 1011 by Kira Vasquez RN  Outcome: Ongoing     Problem: SAFETY  Goal: Free from accidental physical injury  6/22/2021 1011 by Kira Vasquez RN  Outcome: Ongoing     Problem: DAILY CARE  Goal: Daily care needs are met  6/22/2021 1011 by Kira Vasquez RN  Outcome: Ongoing     Problem: PAIN  Goal: Patient's pain/discomfort is manageable  6/22/2021 1011 by Kira Vasquez RN  Outcome: Ongoing     Problem: SKIN INTEGRITY  Goal: Skin integrity is maintained or improved  6/22/2021 1011 by Kira Vasquez RN  Outcome: Ongoing     Problem: KNOWLEDGE DEFICIT  Goal: Patient/S.O. demonstrates understanding of disease process, treatment plan, medications, and discharge instructions.   6/22/2021 1011 by Kira Vasquez RN  Outcome: Ongoing     Problem: DISCHARGE BARRIERS  Goal: Patient's continuum of care needs are met  6/22/2021 1011 by Kira Vasquez RN  Outcome: Ongoing   Electronically signed by Luis Zhou RN on 6/22/2021 at 10:11 AM

## 2021-06-22 NOTE — PROGRESS NOTES
Libby Cordova Progress Note  6/22/2021 9:04 AM  Subjective:   Admit Date: 6/14/2021      Chief Complaint: I want to go to [facility]--I am not comfortable going home     Interval History: Ins has denied ECF coverage--she AMPAC score is 20/24--she and kevon feel she cannot go home alone--looking into private pay at [facility] for transfer today     Diet: ADULT DIET; Regular; No Added Salt (3-4 gm)  Medications:   Scheduled Meds:   LORazepam  0.5 mg Oral BID    estradiol  2 g Vaginal Daily    escitalopram  5 mg Oral Daily    amLODIPine  2.5 mg Oral Daily    aspirin  81 mg Oral Daily    furosemide  20 mg Oral Daily    latanoprost  1 drop Both Eyes Nightly    metoprolol succinate  25 mg Oral Daily    PreserVision AREDS 2  2 capsule Oral Daily    pantoprazole  40 mg Oral QAM AC    potassium chloride  10 mEq Oral Daily    sodium chloride flush  5-40 mL Intravenous 2 times per day    enoxaparin  30 mg Subcutaneous Daily     Continuous Infusions:   sodium chloride         Review of Systems -   General ROS: afebrile  Respiratory ROS: no cough, shortness of breath or wheezing  Cardiovascular ROS: no chest pain  Musculoskeletal ROS:positive for - occas rt knee pain   Neurological ROS: positive for - anxiety    Objective:   Vitals: BP (!) 155/87   Pulse 60   Temp 97.9 °F (36.6 °C) (Oral)   Resp 16   Ht 5' (1.524 m)   Wt 140 lb 10.5 oz (63.8 kg)   SpO2 98%   BMI 27.47 kg/m²   General appearance: alert and cooperative with exam  HEENT: Head: Normocephalic, no lesions, without obvious abnormality.   Neck: no adenopathy, no carotid bruit, no JVD, supple, symmetrical, trachea midline and thyroid not enlarged, symmetric, no tenderness/mass/nodules  Lungs: clear to auscultation bilaterally  Heart: regular rate and rhythm, S1, S2 normal, no murmur, click, rub or gallop  Abdomen: soft, non-tender; bowel sounds normal; no masses,  no organomegaly  Extremities: extremities normal, atraumatic, no cyanosis or edema  Neurologic: Final    Chloride 06/14/2021 103  99 - 110 mmol/L Final    CO2 06/14/2021 26  21 - 32 mmol/L Final    Anion Gap 06/14/2021 12  3 - 16 Final    Glucose 06/14/2021 99  70 - 99 mg/dL Final    BUN 06/14/2021 15  7 - 20 mg/dL Final    CREATININE 06/14/2021 0.7  0.6 - 1.2 mg/dL Final    GFR Non- 06/14/2021 >60  >60 Final    Comment: >60 mL/min/1.73m2 EGFR, calc. for ages 25 and older using the  MDRD formula (not corrected for weight), is valid for stable  renal function.  GFR  06/14/2021 >60  >60 Final    Comment: Chronic Kidney Disease: less than 60 ml/min/1.73 sq.m. Kidney Failure: less than 15 ml/min/1.73 sq.m. Results valid for patients 18 years and older.       Calcium 06/14/2021 9.5  8.3 - 10.6 mg/dL Final    Total Protein 06/14/2021 7.2  6.4 - 8.2 g/dL Final    Albumin 06/14/2021 4.4  3.4 - 5.0 g/dL Final    Albumin/Globulin Ratio 06/14/2021 1.6  1.1 - 2.2 Final    Total Bilirubin 06/14/2021 0.6  0.0 - 1.0 mg/dL Final    Alkaline Phosphatase 06/14/2021 63  40 - 129 U/L Final    ALT 06/14/2021 8* 10 - 40 U/L Final    AST 06/14/2021 17  15 - 37 U/L Final    Globulin 06/14/2021 2.8  g/dL Final    Color, UA 06/14/2021 YELLOW  Straw/Yellow Final    Clarity, UA 06/14/2021 Clear  Clear Final    Glucose, Ur 06/14/2021 Negative  Negative mg/dL Final    Bilirubin Urine 06/14/2021 Negative  Negative Final    Ketones, Urine 06/14/2021 Negative  Negative mg/dL Final    Specific [place], UA 06/14/2021 1.006  1.005 - 1.030 Final    Blood, Urine 06/14/2021 Negative  Negative Final    pH, UA 06/14/2021 7.0  5.0 - 8.0 Final    Protein, UA 06/14/2021 Negative  Negative mg/dL Final    Urobilinogen, Urine 06/14/2021 0.2  <2.0 E.U./dL Final    Nitrite, Urine 06/14/2021 Negative  Negative Final    Leukocyte Esterase, Urine 06/14/2021 Negative  Negative Final    Microscopic Examination 06/14/2021 Not Indicated   Final    Urine Type 06/14/2021 NotGiven   Final    Urine Reflex to Culture 06/14/2021 Not Indicated   Final    Lactic Acid, Sepsis 06/14/2021 0.7  0.4 - 1.9 mmol/L Final    Lactic Acid, Sepsis 06/14/2021 0.9  0.4 - 1.9 mmol/L Final    Blood Culture, Routine 06/14/2021 No Growth after 4 days of incubation. Final    Culture, Blood 2 06/14/2021 No Growth after 4 days of incubation. Final    Procalcitonin 06/14/2021 0.05  0.00 - 0.15 ng/mL Final    Comment: Suspected Sepsis:  Low likelihood of sepsis  <.50 ng/mL    Increased likelihood of sepsis 0.50-2.00 ng/mL  Antibiotics encouraged    High risk of sepsis/shock   >2.00 ng/mL  Antibiotics strongly encouraged    Suspected Lower Respiratory Tract Infections:  Low likelihood of bacterial infection  <0.24 ng/mL    Increased likelihood of bacterial infection >0.24 ng/mL  Antibiotics encouraged    With successful antibiotic therapy, PCT levels should decrease  rapidly. (Half-life of 24 to 36 hours.)    Procalcitonin values from samples collected within the first  6 hours of systemic infection may still be low. Retesting may be indicated. Values from day 1 and day 4 can be entered into the Change in  Procalcitonin Calculator to determine the patient's  Mortality Risk Prognosis  ([email or web address])    In healthy neonates, plasma Procalcitonin (PCT) concentrations  increase gradually after birth, reaching peak values at about  24 hours of age then decrease to normal values below 0.5                            ng/mL  by 48-72 hours of age.  SARS-CoV-2, NAAT 06/14/2021 Not Detected  Not Detected Final    Comment: Rapid NAAT:   Negative results should be treated as presumptive and,  if inconsistent with clinical signs and symptoms or necessary for  patient management, should be tested with an alternative molecular  assay. Negative results do not preclude SARS-CoV-2 infection and  should not be used as the sole basis for patient management decisions.   This test has been authorized by the FDA under an 06/15/2021 0.6  0.6 - 1.2 mg/dL Final    GFR Non- 06/15/2021 >60  >60 Final    Comment: >60 mL/min/1.73m2 EGFR, calc. for ages 25 and older using the  MDRD formula (not corrected for weight), is valid for stable  renal function.  GFR  06/15/2021 >60  >60 Final    Comment: Chronic Kidney Disease: less than 60 ml/min/1.73 sq.m. Kidney Failure: less than 15 ml/min/1.73 sq.m. Results valid for patients 18 years and older.  Calcium 06/15/2021 9.1  8.3 - 10.6 mg/dL Final    Vitamin B-12 06/15/2021 505  211 - 911 pg/mL Final    Folate 06/15/2021 13.05  4.78 - 24.20 ng/mL Final    Comment: Effective 11-15-16 10:00am EST  Please note reference ranges have  changed for Folate.  TSH Reflex FT4 06/15/2021 1.51  0.27 - 4.20 uIU/mL Final    Sed Rate 06/15/2021 9  0 - 30 mm/Hr Final    CRP 06/15/2021 <3.0  0.0 - 5.1 mg/L Final    Comment: WR-CRP Reference range:  30D-199Y    <5.1  <30D        Not established    CRP is used in the detection and evaluation of infection,  tissue injury, inflammatory disorders, and associated  disease. Increases in CRP values are non-specific and  should not be interpreted without a complete clinical  evaluation.  reference ranges have not been  established and values should be interpreted within clinical  context and with serial measurements, if clinically  appropriate.       Color, UA 2021 YELLOW  Straw/Yellow Final    Clarity, UA 2021 Clear  Clear Final    Glucose, Ur 2021 Negative  Negative mg/dL Final    Bilirubin Urine 2021 Negative  Negative Final    Ketones, Urine 2021 Negative  Negative mg/dL Final    Specific Gravity, UA 2021 1.015  1.005 - 1.030 Final    Blood, Urine 2021 Negative  Negative Final    pH, UA 2021 5.0  5.0 - 8.0 Final    Protein, UA 2021 Negative  Negative mg/dL Final    Urobilinogen, Urine 2021 0.2  <2.0 E.U./dL Final    Nitrite, Urine 06/16/2021 Negative  Negative Final    Leukocyte Esterase, Urine 06/16/2021 Negative  Negative Final    Microscopic Examination 06/16/2021 Not Indicated   Final    Urine Type 06/16/2021 NotGiven   Final    WBC, UA 06/16/2021 0-2  0 - 5 /HPF Final    RBC, UA 06/16/2021 0-2  0 - 4 /HPF Final    Epithelial Cells, UA 06/16/2021 0-1  0 - 5 /HPF Final         Assessment & Plan:   Principal Problem:    Blurred vision, bilateral--improved   Active Problems:    Dizziness    Pain of right thigh--lumb DDD     Pure hypercholesterolemia    Essential hypertension  Resolved Problems:    * No resolved hospital problems.  *  Will DC to [facility] today as privste pay if bed available--called kevon and LM--was NA    DS dictated   Please note that over 35 minutes was spent in evaluating the patient, review of records and results, discussion with staff/family, etc.    Norma Allen MD

## 2021-06-22 NOTE — PROGRESS NOTES
Physical Therapy  Facility/Department: [facility] 5W PROGRESSIVE CARE  Daily Treatment Note  NAME: Deepak Irizarry  : 3/11/1929  MRN: [id number]    Date of Service: 2021    Discharge Recommendations:  Patient would benefit from continued therapy after discharge   PT Equipment Recommendations  Equipment Needed: No  Deepak Irizarry scored a 20/24 on the AM-PAC short mobility form. At this time, pt reports she does not feel comfortable with returning home therefore wants to go to SNF private pay for 1-2 weeks. Recommend continued therapy at discharge. Assessment   Body structures, Functions, Activity limitations: Decreased functional mobility   Assessment: Pt making progress in therapy; pt lives alone and feel she would be safe with increased assist at home as she has stairs to 2nd floor. Pt's family unable to provide increased assist and she does not want strangers in her home (she would accept therapy in home). Discussed looking at options for when she returns home for stair lift however did recommend making sure when she does return home that she wait to go to her second floor until therapy can work with her to feel more confident on stairs in her own home  Prognosis: Good  Patient Education: discussed options at home to increase her safety  Barriers to Learning: forgetful; some decreased insight into safety issues at home  Activity Tolerance  Activity Tolerance: Patient Tolerated treatment well  Activity Tolerance: pt reports she is still anxious about going home and wants to private pay at nursing facility for approx 1-2 weeks just to feel more comfortable returning home     Patient Diagnosis(es): The primary encounter diagnosis was Dizziness. Diagnoses of Blurred vision, Shortness of breath, and Anxiety were also pertinent to this visit.      has a past medical history of Arthritis, Blood circulation, collateral, Cataract, Clostridium difficile infection, GERD (gastroesophageal reflux disease), agreeable to getting up with therapy; pt very forgetful, does not remember working with therapy at all and reports she's only been up and walking twice since she's been here.  some c/o discomfort in L knee with negotiating stairs          Orientation  Orientation  Overall Orientation Status: Within Functional Limits  Cognition   Cognition  Overall Cognitive Status: [facility]  Cognition Comment: forgetful  Objective   Bed mobility  Supine to Sit: Supervision  Transfers  Sit to Stand: Supervision;Stand by assistance  Stand to sit: Stand by assistance  Ambulation  Ambulation?: Yes  More Ambulation?: Yes  Ambulation 1  Surface: level tile  Device: No Device  Assistance: Stand by assistance  Quality of Gait: slow pace with keeping her UEs in slightly high guard position but no LOB  Distance: 25'x2 and 10'  Ambulation 2  Surface - 2: level tile  Device 2: [address] 2: Stand by assistance  Quality of Gait 2: slow pace with small steps but no LOB  Distance: 61' and 75'  Stairs/Curb  Stairs?: Yes  Stairs  # Steps : 12  Stairs Height: 8\"  Rails: Bilateral  Assistance: Stand by assistance;Contact guard assistance  Comment: slow descend and ascent of stairs but no LOB; our stairs are wider than her steps so she had more difficulty reaching both rails but reports she feels more comfortable using both rails     Balance  Sitting - Static: Good  Sitting - Dynamic: Good  Standing - Static: Fair;+  Standing - Dynamic: Fair;+            Comment: pt used commode (assisted with changing adult brief) and stood and brushed her teeth and combed her hair with SBA; Pt donned shoes and compression stockings Ind prior to walking in hallway; assisted with positioning in chair for comfort with all needs in reach              G-Code     OutComes Score                                                     AM-PAC Score  AM-PAC Inpatient Mobility Raw Score : 20 (06/22/21 0859)  AM-PAC Inpatient T-Scale Score : 47.67 (06/22/21 0859)  Mobility Inpatient CMS 0-100% Score: 35.83 (06/22/21 0859)  Mobility Inpatient CMS G-Code Modifier : Alee Maldonado (06/22/21 9801)          Goals  Short term goals  Time Frame for Short term goals: by discharge  Short term goal 1: transfers [facility]  Short term goal 2: amb 100' with or without AD SBA/sup  Short term goal 3: negotiate stairs when able - met with SBA/CGA  Patient Goals   Patient goals : pt now wants to go to a rehab facility to get stronger and be able to amb without a walker    Plan    Plan  Times per week: 3-5  Current Treatment Recommendations: Functional Mobility Training  Safety Devices  Type of devices: Call light within reach, Chair alarm in place, Left in chair, All fall risk precautions in place, Nurse notified     Therapy Time   Individual Concurrent Group Co-treatment   Time In 0740         Time Out 2529         Minutes 66                 SOTERO FOY, PT    Electronically signed by SOTERO FOY PT on 6/22/2021 at 9:00 AM

## 2021-06-22 NOTE — CARE COORDINATION
Received notice of dc today. Spoke with pt and then daughter Ron Sawant at [phone], who is aware of insurance denial and pt will need to pay privately at [facility]. Daughter to call Meghan in admissions at [facility] for cost.    Will await pt and daughters decision re disposition decision.    Electronically signed by SLOAN Lomeli on 6/22/2021 at 9:54 AM

## 2021-06-22 NOTE — CARE COORDINATION
Pt and [facility] have [facility]. Daughter is to transport pt at 200 pm to [facility] has been completed. Call report to Pipo N Johny Alfaro at [phone] second floor.    Electronically signed by SLOAN Lion on 6/22/2021 at 11:59 AM

## 2021-06-22 NOTE — PROGRESS NOTES
Patient discharged to [facility], second floor, from Room 8048. Patient alert and oriented, no complaints of pain. IV was removed without complication. All discharge paperwork reviewed and questions answered. Transportation to [facility] provided by patient's daughter. This RN spoke with Dr. Ronald Jones to confirm that he will call in prescription for lorazepam into patient's pharmacy. This information was provided to the patient and her family. Handoff report provided to Rao Lindsay RN at [facility].      Electronically signed by Mayra Gonzalez RN on 6/22/2021 at 3:39 PM

## 2021-06-29 ENCOUNTER — APPOINTMENT (OUTPATIENT)
Dept: GENERAL RADIOLOGY | Age: 86
End: 2021-06-29
Payer: MEDICARE

## 2021-06-29 ENCOUNTER — HOSPITAL ENCOUNTER (OUTPATIENT)
Age: 86
Setting detail: OBSERVATION
Discharge: HOME | End: 2021-07-01
Attending: EMERGENCY MEDICINE | Admitting: INTERNAL MEDICINE
Payer: MEDICARE

## 2021-06-29 DIAGNOSIS — R07.9 CHEST PAIN, UNSPECIFIED TYPE: Primary | ICD-10-CM

## 2021-06-29 LAB
ANION GAP SERPL CALCULATED.3IONS-SCNC: 12 MMOL/L (ref 3–16)
BASOPHILS ABSOLUTE: 0 K/UL (ref 0–0.2)
BASOPHILS RELATIVE PERCENT: 0.3 %
BILIRUBIN URINE: NEGATIVE
BLOOD, URINE: NEGATIVE
BUN BLDV-MCNC: 10 MG/DL (ref 7–20)
CALCIUM SERPL-MCNC: 8.8 MG/DL (ref 8.3–10.6)
CHLORIDE BLD-SCNC: 104 MMOL/L (ref 99–110)
CLARITY: ABNORMAL
CO2: 22 MMOL/L (ref 21–32)
COLOR: YELLOW
CREAT SERPL-MCNC: 0.6 MG/DL (ref 0.6–1.2)
EOSINOPHILS ABSOLUTE: 0 K/UL (ref 0–0.6)
EOSINOPHILS RELATIVE PERCENT: 0.5 %
EPITHELIAL CELLS, UA: 1 /HPF (ref 0–5)
GFR AFRICAN AMERICAN: >60
GFR NON-AFRICAN AMERICAN: >60
GLUCOSE BLD-MCNC: 97 MG/DL (ref 70–99)
GLUCOSE URINE: NEGATIVE MG/DL
HCT VFR BLD CALC: 36.4 % (ref 36–48)
HEMOGLOBIN: 12.8 G/DL (ref 12–16)
HYALINE CASTS: 0 /LPF (ref 0–8)
KETONES, URINE: 15 MG/DL
LEUKOCYTE ESTERASE, URINE: NEGATIVE
LYMPHOCYTES ABSOLUTE: 1.1 K/UL (ref 1–5.1)
LYMPHOCYTES RELATIVE PERCENT: 19.8 %
MAGNESIUM: 2 MG/DL (ref 1.8–2.4)
MCH RBC QN AUTO: 32.1 PG (ref 26–34)
MCHC RBC AUTO-ENTMCNC: 35.1 G/DL (ref 31–36)
MCV RBC AUTO: 91.5 FL (ref 80–100)
MICROSCOPIC EXAMINATION: YES
MONOCYTES ABSOLUTE: 0.6 K/UL (ref 0–1.3)
MONOCYTES RELATIVE PERCENT: 11.6 %
NEUTROPHILS ABSOLUTE: 3.6 K/UL (ref 1.7–7.7)
NEUTROPHILS RELATIVE PERCENT: 67.8 %
NITRITE, URINE: NEGATIVE
PDW BLD-RTO: 13.1 % (ref 12.4–15.4)
PH UA: 6 (ref 5–8)
PLATELET # BLD: 196 K/UL (ref 135–450)
PMV BLD AUTO: 8.5 FL (ref 5–10.5)
POTASSIUM REFLEX MAGNESIUM: 3.5 MMOL/L (ref 3.5–5.1)
PROTEIN UA: NEGATIVE MG/DL
RBC # BLD: 3.97 M/UL (ref 4–5.2)
RBC UA: 1 /HPF (ref 0–4)
SODIUM BLD-SCNC: 138 MMOL/L (ref 136–145)
SPECIFIC GRAVITY UA: 1.01 (ref 1–1.03)
TROPONIN: <0.01 NG/ML
URINE REFLEX TO CULTURE: ABNORMAL
URINE TYPE: ABNORMAL
UROBILINOGEN, URINE: 0.2 E.U./DL
WBC # BLD: 5.4 K/UL (ref 4–11)
WBC UA: 0 /HPF (ref 0–5)

## 2021-06-29 PROCEDURE — 84484 ASSAY OF TROPONIN QUANT: CPT

## 2021-06-29 PROCEDURE — 6370000000 HC RX 637 (ALT 250 FOR IP): Performed by: PHYSICIAN ASSISTANT

## 2021-06-29 PROCEDURE — G0378 HOSPITAL OBSERVATION PER HR: HCPCS

## 2021-06-29 PROCEDURE — 83735 ASSAY OF MAGNESIUM: CPT

## 2021-06-29 PROCEDURE — 93005 ELECTROCARDIOGRAM TRACING: CPT | Performed by: EMERGENCY MEDICINE

## 2021-06-29 PROCEDURE — 80048 BASIC METABOLIC PNL TOTAL CA: CPT

## 2021-06-29 PROCEDURE — 2580000003 HC RX 258: Performed by: PHYSICIAN ASSISTANT

## 2021-06-29 PROCEDURE — 85025 COMPLETE CBC W/AUTO DIFF WBC: CPT

## 2021-06-29 PROCEDURE — 71045 X-RAY EXAM CHEST 1 VIEW: CPT

## 2021-06-29 PROCEDURE — 99285 EMERGENCY DEPT VISIT HI MDM: CPT

## 2021-06-29 PROCEDURE — 81001 URINALYSIS AUTO W/SCOPE: CPT

## 2021-06-29 PROCEDURE — 6370000000 HC RX 637 (ALT 250 FOR IP): Performed by: INTERNAL MEDICINE

## 2021-06-29 RX ORDER — ACETAMINOPHEN 325 MG/1
650 TABLET ORAL EVERY 6 HOURS PRN
Status: DISCONTINUED | OUTPATIENT
Start: 2021-06-29 | End: 2021-07-01 | Stop reason: HOSPADM

## 2021-06-29 RX ORDER — POLYETHYLENE GLYCOL 3350 17 G/17G
17 POWDER, FOR SOLUTION ORAL DAILY PRN
Status: DISCONTINUED | OUTPATIENT
Start: 2021-06-29 | End: 2021-06-29

## 2021-06-29 RX ORDER — ATORVASTATIN CALCIUM 40 MG/1
40 TABLET, FILM COATED ORAL NIGHTLY
Status: DISCONTINUED | OUTPATIENT
Start: 2021-06-29 | End: 2021-07-01 | Stop reason: HOSPADM

## 2021-06-29 RX ORDER — ASPIRIN 81 MG/1
81 TABLET, CHEWABLE ORAL DAILY
Status: DISCONTINUED | OUTPATIENT
Start: 2021-06-30 | End: 2021-07-01 | Stop reason: HOSPADM

## 2021-06-29 RX ORDER — ACETAMINOPHEN 325 MG/1
650 TABLET ORAL EVERY 6 HOURS PRN
Status: DISCONTINUED | OUTPATIENT
Start: 2021-06-29 | End: 2021-06-29

## 2021-06-29 RX ORDER — FUROSEMIDE 20 MG/1
20 TABLET ORAL DAILY
Status: DISCONTINUED | OUTPATIENT
Start: 2021-06-30 | End: 2021-07-01 | Stop reason: HOSPADM

## 2021-06-29 RX ORDER — POTASSIUM CHLORIDE 750 MG/1
10 TABLET, FILM COATED, EXTENDED RELEASE ORAL
Status: DISCONTINUED | OUTPATIENT
Start: 2021-06-30 | End: 2021-06-30

## 2021-06-29 RX ORDER — LORAZEPAM 0.5 MG/1
0.5 TABLET ORAL 2 TIMES DAILY PRN
Status: DISCONTINUED | OUTPATIENT
Start: 2021-06-29 | End: 2021-06-30

## 2021-06-29 RX ORDER — 0.9 % SODIUM CHLORIDE 0.9 %
500 INTRAVENOUS SOLUTION INTRAVENOUS ONCE
Status: COMPLETED | OUTPATIENT
Start: 2021-06-29 | End: 2021-06-29

## 2021-06-29 RX ORDER — ACETAMINOPHEN 500 MG
1000 TABLET ORAL ONCE
Status: COMPLETED | OUTPATIENT
Start: 2021-06-29 | End: 2021-06-29

## 2021-06-29 RX ORDER — POLYETHYLENE GLYCOL 3350 17 G/17G
17 POWDER, FOR SOLUTION ORAL DAILY PRN
Status: DISCONTINUED | OUTPATIENT
Start: 2021-06-29 | End: 2021-07-01 | Stop reason: HOSPADM

## 2021-06-29 RX ORDER — ASPIRIN 81 MG/1
81 TABLET, CHEWABLE ORAL DAILY
Status: DISCONTINUED | OUTPATIENT
Start: 2021-06-30 | End: 2021-06-29

## 2021-06-29 RX ORDER — LATANOPROST 50 UG/ML
1 SOLUTION/ DROPS OPHTHALMIC NIGHTLY
Status: DISCONTINUED | OUTPATIENT
Start: 2021-06-29 | End: 2021-07-01 | Stop reason: HOSPADM

## 2021-06-29 RX ORDER — ONDANSETRON 4 MG/1
4 TABLET, ORALLY DISINTEGRATING ORAL EVERY 8 HOURS PRN
Status: DISCONTINUED | OUTPATIENT
Start: 2021-06-29 | End: 2021-07-01 | Stop reason: HOSPADM

## 2021-06-29 RX ORDER — ISOSORBIDE MONONITRATE 30 MG/1
30 TABLET, EXTENDED RELEASE ORAL DAILY
Status: DISCONTINUED | OUTPATIENT
Start: 2021-06-30 | End: 2021-07-01 | Stop reason: HOSPADM

## 2021-06-29 RX ORDER — I-VITE, TAB 1000-60-2MG (60/BT) 300MCG-200
1 TAB ORAL DAILY
Status: DISCONTINUED | OUTPATIENT
Start: 2021-06-30 | End: 2021-07-01 | Stop reason: HOSPADM

## 2021-06-29 RX ORDER — AMLODIPINE BESYLATE 5 MG/1
2.5 TABLET ORAL DAILY
Status: DISCONTINUED | OUTPATIENT
Start: 2021-06-30 | End: 2021-07-01 | Stop reason: HOSPADM

## 2021-06-29 RX ORDER — SODIUM CHLORIDE 9 MG/ML
25 INJECTION, SOLUTION INTRAVENOUS PRN
Status: DISCONTINUED | OUTPATIENT
Start: 2021-06-29 | End: 2021-07-01 | Stop reason: HOSPADM

## 2021-06-29 RX ORDER — ESCITALOPRAM OXALATE 10 MG/1
5 TABLET ORAL DAILY
Status: DISCONTINUED | OUTPATIENT
Start: 2021-06-30 | End: 2021-07-01 | Stop reason: HOSPADM

## 2021-06-29 RX ORDER — NITROGLYCERIN 0.4 MG/1
0.4 TABLET SUBLINGUAL EVERY 5 MIN PRN
Status: DISCONTINUED | OUTPATIENT
Start: 2021-06-29 | End: 2021-07-01 | Stop reason: HOSPADM

## 2021-06-29 RX ORDER — ACETAMINOPHEN 650 MG/1
650 SUPPOSITORY RECTAL EVERY 6 HOURS PRN
Status: DISCONTINUED | OUTPATIENT
Start: 2021-06-29 | End: 2021-07-01 | Stop reason: HOSPADM

## 2021-06-29 RX ORDER — SODIUM CHLORIDE 0.9 % (FLUSH) 0.9 %
5-40 SYRINGE (ML) INJECTION EVERY 12 HOURS SCHEDULED
Status: DISCONTINUED | OUTPATIENT
Start: 2021-06-30 | End: 2021-07-01 | Stop reason: HOSPADM

## 2021-06-29 RX ORDER — METOPROLOL SUCCINATE 25 MG/1
25 TABLET, EXTENDED RELEASE ORAL DAILY
Status: DISCONTINUED | OUTPATIENT
Start: 2021-06-30 | End: 2021-07-01 | Stop reason: HOSPADM

## 2021-06-29 RX ORDER — SODIUM CHLORIDE 0.9 % (FLUSH) 0.9 %
5-40 SYRINGE (ML) INJECTION PRN
Status: DISCONTINUED | OUTPATIENT
Start: 2021-06-29 | End: 2021-07-01 | Stop reason: HOSPADM

## 2021-06-29 RX ORDER — ONDANSETRON 2 MG/ML
4 INJECTION INTRAMUSCULAR; INTRAVENOUS EVERY 6 HOURS PRN
Status: DISCONTINUED | OUTPATIENT
Start: 2021-06-29 | End: 2021-07-01 | Stop reason: HOSPADM

## 2021-06-29 RX ORDER — PANTOPRAZOLE SODIUM 40 MG/1
40 TABLET, DELAYED RELEASE ORAL
Status: DISCONTINUED | OUTPATIENT
Start: 2021-06-30 | End: 2021-07-01 | Stop reason: HOSPADM

## 2021-06-29 RX ADMIN — SODIUM CHLORIDE 500 ML: 9 INJECTION, SOLUTION INTRAVENOUS at 18:03

## 2021-06-29 RX ADMIN — ACETAMINOPHEN 1000 MG: 500 TABLET ORAL at 18:19

## 2021-06-29 RX ADMIN — LATANOPROST 1 DROP: 50 SOLUTION OPHTHALMIC at 21:48

## 2021-06-29 RX ADMIN — ATORVASTATIN CALCIUM 40 MG: 40 TABLET, FILM COATED ORAL at 21:49

## 2021-06-29 ASSESSMENT — PAIN DESCRIPTION - FREQUENCY
FREQUENCY: INTERMITTENT
FREQUENCY: CONTINUOUS

## 2021-06-29 ASSESSMENT — PAIN SCALES - GENERAL
PAINLEVEL_OUTOF10: 1
PAINLEVEL_OUTOF10: 1
PAINLEVEL_OUTOF10: 2
PAINLEVEL_OUTOF10: 0

## 2021-06-29 ASSESSMENT — PAIN DESCRIPTION - ONSET
ONSET: ON-GOING
ONSET: ON-GOING

## 2021-06-29 ASSESSMENT — PAIN DESCRIPTION - PROGRESSION: CLINICAL_PROGRESSION: NOT CHANGED

## 2021-06-29 ASSESSMENT — PAIN DESCRIPTION - DESCRIPTORS: DESCRIPTORS: ACHING

## 2021-06-29 ASSESSMENT — PAIN DESCRIPTION - LOCATION
LOCATION: CHEST
LOCATION: CHEST

## 2021-06-29 ASSESSMENT — PAIN DESCRIPTION - PAIN TYPE
TYPE: ACUTE PAIN
TYPE: ACUTE PAIN

## 2021-06-29 NOTE — PROGRESS NOTES
Pharmacy Medication Reconciliation Note     List of medications patient is currently taking is complete. Source of information:   1. Per conversation with patient and family at bedside   2. EMR     Notes regarding home medications:   1. Per patient, she had all home meds PTA in the ED  2. Patient reports that she was given a new med in [facility]--escitalopram 5 mg. Family is concerned as they do not think her meds should have changed, but her PCP added this med on 10 days ago. Pt has been taking QHS and did NOT have today 06/29/21  3. Patient reports only taking her lorazepam 1-2 times QD PRN    Patient denies taking any other OTC or herbal medications other than those listed.     Kavya Moya, Pharmacy Intern  6/29/2021  7:18 PM

## 2021-06-29 NOTE — ED NOTES
Pt. Family came out of room to this nurse stating patient was \"in distress\" this nurse got off phone for report for another patient and went into room. Patient alert, oriented stating \" I am having discomfort in my chest and feel like it is hard to breathe. Patient O2SAT 100% on RA. Dr. Nadir Urbina notified. Repeat EKG completed. No changes at this time.       Prashant Stevens RN  06/29/21 5426

## 2021-06-29 NOTE — ED NOTES
Bed: [facility]  Expected date: 6/29/21  Expected time: 5:03 PM  Means of arrival: [facility]  Comments:  89F chest pain       Jessica Norton RN  06/29/21 7906

## 2021-06-29 NOTE — ED PROVIDER NOTES
I independently evaluated and obtained a history and physical on [facility]. All diagnostic, treatment, and disposition assistants were made to myself in conjunction the advanced practice provider. For further details of this patient's emergency department encounter, please see the advanced practice provider's documentation. History: Patient is a 59-year-old female with complaints of chest pain. Patient took some aspirin prior to arrival one nitro. Pain down to about 1 out of 10. Patient lives alone. There is a smile forgetfulness no family history of dementia.  had dementia. Patient denies a headache. Family at bedside. Patient was seen in conjunction with my SULLY. Concern with her chest pain. Physician Exam: Patient is a 59-year-old with chest pain. Relieved with nitro. Vital signs were normal.  Patient had continue intermittent chest pain. While in the hospital.  No changes on EKG on monitor. Her lungs were clear heart regular rate and rhythm no evidence of stroke at this time. Patient is able to answer questions. I doubt that patient has strokes. I suspect given her age and mental status has by a mild forgetfulness and may be early dementia.           Labs Reviewed   CBC WITH AUTO DIFFERENTIAL - Abnormal; Notable for the following components:       Result Value    RBC 3.97 (*)     All other components within normal limits    Narrative:     Performed at:  [facility]  [address] [place], [place] [address]   Phone [phone]   URINE RT REFLEX TO CULTURE - Abnormal; Notable for the following components:    Clarity, UA CLOUDY (*)     Ketones, Urine 15 (*)     All other components within normal limits    Narrative:     Performed at:  [facility]  [address] [place], [place] [address]   Phone [phone]   BASIC METABOLIC PANEL W/ REFLEX TO MG FOR LOW K    Narrative:     Performed at:  [facility]  [address] [place], [place] [address]   Phone [phone]   TROPONIN    Narrative:     Performed at:  [facility]  [address] [place], [place] [address]   Phone [phone]   MAGNESIUM    Narrative:     Performed at:  [facility]  [address] [facility] [place], [place] [address]   Phone [phone]   MICROSCOPIC URINALYSIS    Narrative:     Performed at:  [facility]  [address] [facility] [place], [place] [address]   Phone [phone]       EKG interpretation      Sinus rhythm at a rate of 62 occasional PAC otherwise normal EKG no ST elevation. This was the second EKG that was done    First EKG also has similar reading without PAC. 80year-old in no acute distress. Recommend admission at this time. I SULLY did discuss the case with the family physician Dr. Hargrove Lay on call. Patient will be admitted stable condition placed on oxygen.      Quang Chavez MD  06/29/21 2011

## 2021-06-29 NOTE — ED PROVIDER NOTES
[facility]        Pt Name: Peter Acuña  MRN: [id number]  [place] 3/11/1929  Date of evaluation: 6/29/2021  Provider: Vijaya Lowery PA-C  PCP: Layo Hou MD  Note Started: 6:54 PM EDT        I have seen and evaluated this patient with my supervising physician Tanvir Prater MD.    [facility]       Chief Complaint   Patient presents with    Chest Pain     onset on e hr.  324 asa given. one nitro given. pain now 1/10. HISTORY OF PRESENT ILLNESS   (Location, Timing/Onset, Context/Setting, Quality, Duration, Modifying Factors, Severity, Associated Signs and Symptoms)  Note limiting factors. Chief Complaint: Chest pain    Peter Acuña is a 80 y.o. female who presents stating that about an hour before coming in she started having some pain across the center of her chest.  Eventually an animal is called and they came to her house and brought her in. On the way they gave her some aspirin and nitro and she states now the pain that was there when she was at home is not there. She still feels a twinge of something in the center chest from time to time but says the pain that was there before is gone. She is denying any difficulty breathing at this time. No acute fall or contusion. She was not being very active when this occurred. She denies any abdominal pain or acute urine or stool changes or extremity changes other than stating the right thigh seems to be hurting little bit. She wonders if the storm is coming in as that sometimes will cause this symptom. Patient's family then joins the patient at bedside. They feel that she seems not quite as sharp as usual.  She seems to be speaking well but occasionally she will stumble over a detail. She lives at home alone. Patient mentions that she just had a heart test pretty recently that came back looking good.   Nursing Notes were all reviewed and agreed with or any disagreements were addressed in the HPI. REVIEW OF SYSTEMS    (2-9 systems for level 4, 10 or more for level 5)     Review of Systems  Positive history as above with no fevers or chills cough congestion headache vision change neck pain or stiffness. No difficulty breathing at this time and patient stating that her chest pain from earlier is much better with something lingering that feels a little funny just in the middle of the chest.  No radiating pain such as the shoulder or jaw neck back or arm. No abdominal pain or nausea or vomiting. No acute urine or stool changes or extremity changes. Positives and Pertinent negatives as per HPI. PAST MEDICAL HISTORY     Past Medical History:   Diagnosis Date    Arthritis     Blood circulation, collateral     Cataract     Clostridium difficile infection 1/28/14    GERD (gastroesophageal reflux disease)     Glaucoma     Hyperlipidemia     no meds    Hypertension 2017    essential=pt denies    Macular degeneration     UTI (urinary tract infection)          SURGICAL HISTORY     Past Surgical History:   Procedure Laterality Date    CATARACT REMOVAL Bilateral 2008    COLONOSCOPY      12/17/2009    HERNIA REPAIR Left 06/08/2018    repair of ventral hernia with mesh    HERNIA REPAIR Right 6/4/2020    OPEN RIGHT INGUINAL HERNIA REPAIR WITH MESH performed by Junie Rosas MD at [address]      rt 6/9/2009   dr Burns [place]   [facility], 1984 left.     x3         CURRENTMEDICATIONS       Previous Medications    ACETAMINOPHEN (TYLENOL) 325 MG TABLET    Take 2 tablets by mouth every 6 hours as needed for Pain    AMLODIPINE (NORVASC) 2.5 MG TABLET    Take 1 tablet by mouth daily    ASPIRIN 81 MG TABLET    Take 81 mg by mouth daily    CALCIUM CARBONATE (OSCAL) 500 MG TABS TABLET    Take 1,000 mg by mouth daily    CRANBERRY 450 MG CAPS    Take 450 mg by mouth daily ESCITALOPRAM (LEXAPRO) 5 MG TABLET    Take 1 tablet by mouth daily    ESTRADIOL (ESTRACE) 0.1 MG/GM VAGINAL CREAM    Place 2 g vaginally daily    FUROSEMIDE (LASIX) 20 MG TABLET    TAKE ONE TABLET BY MOUTH EVERY MORNING    LATANOPROST (XALATAN) 0.005 % SOLN    Place 1 drop into both eyes nightly     LORAZEPAM (ATIVAN) 0.5 MG TABLET    Take 1 tablet by mouth 2 times daily for 30 days. LORAZEPAM (ATIVAN) 0.5 MG TABLET    Take 1 tablet by mouth every 8 hours as needed for Anxiety for up to 30 days. LORAZEPAM (ATIVAN) 0.5 MG TABLET    Take 1 tablet by mouth every 8 hours as needed for Anxiety for up to 30 days. LORAZEPAM (ATIVAN) 0.5 MG TABLET    Take 1 tablet by mouth 2 times daily for 60 days.     METOPROLOL SUCCINATE (TOPROL XL) 25 MG EXTENDED RELEASE TABLET    TAKE ONE TABLET BY MOUTH DAILY    MULTIPLE VITAMINS-MINERALS (PRESERVISION AREDS 2) CAPS    Take 2 capsules by mouth daily    PANTOPRAZOLE (PROTONIX) 40 MG TABLET    TAKE ONE TABLET BY MOUTH EVERY MORNING    POLYETHYLENE GLYCOL (GLYCOLAX) PACKET    Take 17 g by mouth daily as needed for Constipation    POTASSIUM CHLORIDE (KLOR-CON M) 10 MEQ EXTENDED RELEASE TABLET    TAKE ONE TABLET BY MOUTH DAILY    PROBIOTIC PRODUCT (ALIGN PO)    Take 1 tablet by mouth daily          ALLERGIES     Ciprofloxacin, Oxycodone-acetaminophen, and Percocet [oxycodone-acetaminophen]    FAMILYHISTORY       Family History   Problem Relation Age of Onset    Cancer Sister         pancreatic          SOCIAL HISTORY       Social History     Tobacco Use    Smoking status: Never Smoker    Smokeless tobacco: Never Used   Vaping Use    Vaping Use: Never used   Substance Use Topics    Alcohol use: No    Drug use: No       SCREENINGS             PHYSICAL EXAM    (up to 7 for level 4, 8 or more for level 5)     ED Triage Vitals   BP Temp Temp Source Pulse Resp SpO2 Height Weight   06/29/21 1721 06/29/21 1756 06/29/21 1756 06/29/21 1730 06/29/21 1730 06/29/21 1721 06/29/21 1721 06/29/21 1721   (!) 144/69 97.9 °F (36.6 °C) Oral 77 29 96 % 5' (1.524 m) 145 lb 11.6 oz (66.1 kg)       Physical Exam  Vitals and nursing note reviewed. Constitutional:       General: She is not in acute distress. Appearance: Normal appearance. She is normal weight. She is not ill-appearing, toxic-appearing or diaphoretic. HENT:      Head: Normocephalic and atraumatic. Right Ear: External ear normal.      Left Ear: External ear normal.      Nose: Nose normal.      Mouth/Throat:      Mouth: Mucous membranes are moist.   Eyes:      General:         Right eye: No discharge. Left eye: No discharge. Conjunctiva/sclera: Conjunctivae normal.   Cardiovascular:      Rate and Rhythm: Normal rate and regular rhythm. Pulses: Normal pulses. Radial pulses are 2+ on the right side and 2+ on the left side. Dorsalis pedis pulses are 2+ on the right side and 2+ on the left side. Heart sounds: Normal heart sounds. No murmur heard. No gallop. Pulmonary:      Effort: Pulmonary effort is normal. No respiratory distress. Breath sounds: Normal breath sounds. No wheezing, rhonchi or rales. Abdominal:      General: There is no distension. Palpations: Abdomen is soft. There is no mass. Tenderness: There is no abdominal tenderness. There is no right CVA tenderness, left CVA tenderness, guarding or rebound. Musculoskeletal:         General: No swelling, tenderness, deformity or signs of injury. Normal range of motion. Cervical back: Normal range of motion and neck supple. No rigidity. Right lower leg: No edema. Left lower leg: No edema. Lymphadenopathy:      Cervical: No cervical adenopathy. Skin:     General: Skin is warm and dry. Capillary Refill: Capillary refill takes less than 2 seconds. Findings: No lesion. Neurological:      General: No focal deficit present.       Mental Status: She is alert and oriented to person, place, and time. Mental status is at baseline. Psychiatric:         Mood and Affect: Mood normal.         Behavior: Behavior normal.         Thought Content: Thought content normal.         Judgment: Judgment normal.         DIAGNOSTIC RESULTS   LABS:    Labs Reviewed   CBC WITH AUTO DIFFERENTIAL - Abnormal; Notable for the following components:       Result Value    RBC 3.97 (*)     All other components within normal limits    Narrative:     Performed at:  [address] [employer] [address]   Phone [phone]   URINE RT REFLEX TO CULTURE - Abnormal; Notable for the following components:    Clarity, UA CLOUDY (*)     Ketones, Urine 15 (*)     All other components within normal limits    Narrative:     Performed at:  [address] [address]   Phone [phone]   BASIC METABOLIC PANEL W/ REFLEX TO MG FOR LOW K    Narrative:     Performed at:  [address] [address]   Phone [phone]   TROPONIN    Narrative:     Performed at:  [facility]  [address] [place] [address]   Phone [phone]   MAGNESIUM    Narrative:     Performed at:  [facility]  [address] [place] [address]   Phone [phone]   MICROSCOPIC URINALYSIS    Narrative:     Performed at:  [facility]  [address] [place] [address]   Phone [phone]       When ordered only abnormal lab results are displayed. All other labs were within normal range or not returned as of this dictation. EKG: When ordered, EKG's are interpreted by the Emergency Department Physician in the absence of a cardiologist.  Please see their note for interpretation of EKG.     RADIOLOGY:   Non-plain film images such as CT, Ultrasound and MRI are read by the radiologist. Wilson Benítez radiographic images are visualized and preliminarily interpreted by the ED Provider with the below findings:        Interpretation per the Radiologist below, if available at the time of this note:    XR CHEST PORTABLE   Final Result   No acute cardiopulmonary disease. XR CHEST PORTABLE    Result Date: 6/29/2021  EXAMINATION: ONE XRAY VIEW OF THE CHEST 6/29/2021 5:40 pm COMPARISON: 06/14/2021 HISTORY: ORDERING SYSTEM PROVIDED HISTORY: Chest Pain TECHNOLOGIST PROVIDED HISTORY: Reason for exam:->Chest Pain Reason for Exam: chest pain Acuity: Acute Type of Exam: Initial FINDINGS: The cardiac silhouette, mediastinal and hilar contours are normal.  The lungs are clear. There are no pleural effusions. No acute osseous abnormalities are identified. No acute cardiopulmonary disease. PROCEDURES   Unless otherwise noted below, none     Procedures    CRITICAL CARE TIME   N/A    CONSULTS:  IP CONSULT TO FAMILY MEDICINE      EMERGENCY DEPARTMENT COURSE and DIFFERENTIAL DIAGNOSIS/MDM:   Vitals:    Vitals:    06/29/21 1800 06/29/21 1830 06/29/21 1845 06/29/21 1900   BP:       Pulse: 70 64 62 59   Resp: 14 13 15 15   Temp:       TempSrc:       SpO2: 98% 98% 98% 97%   Weight:       Height:           Patient was given the following medications:  Medications   0.9 % sodium chloride bolus (500 mLs Intravenous New Bag 6/29/21 1803)   acetaminophen (TYLENOL) tablet 1,000 mg (1,000 mg Oral Given 6/29/21 1819)         This patient presents as above and is given some medication for her thigh discomfort. Chest x-ray and EKG obtained. EKG interpreted by ED physician. Please see that note for details. After while patient did report that her chest pain was returning. Repeat EKG also obtained at that time. No indication of acute ST elevation or depression greater than 1 mm or worrisome rhythm at this time. Labs show no acute elevation of the initial troponin.   BMP magnesium and and magnesium

## 2021-06-30 LAB
CHOLESTEROL, TOTAL: 151 MG/DL (ref 0–199)
EKG ATRIAL RATE: 61 BPM
EKG ATRIAL RATE: 62 BPM
EKG ATRIAL RATE: 66 BPM
EKG DIAGNOSIS: NORMAL
EKG P AXIS: 15 DEGREES
EKG P AXIS: 27 DEGREES
EKG P AXIS: 4 DEGREES
EKG P-R INTERVAL: 126 MS
EKG P-R INTERVAL: 152 MS
EKG P-R INTERVAL: 160 MS
EKG Q-T INTERVAL: 384 MS
EKG Q-T INTERVAL: 394 MS
EKG Q-T INTERVAL: 394 MS
EKG QRS DURATION: 66 MS
EKG QRS DURATION: 70 MS
EKG QRS DURATION: 78 MS
EKG QTC CALCULATION (BAZETT): 386 MS
EKG QTC CALCULATION (BAZETT): 399 MS
EKG QTC CALCULATION (BAZETT): 413 MS
EKG R AXIS: -12 DEGREES
EKG R AXIS: -5 DEGREES
EKG R AXIS: -5 DEGREES
EKG T AXIS: -3 DEGREES
EKG T AXIS: 15 DEGREES
EKG T AXIS: 5 DEGREES
EKG VENTRICULAR RATE: 61 BPM
EKG VENTRICULAR RATE: 62 BPM
EKG VENTRICULAR RATE: 66 BPM
HCT VFR BLD CALC: 34.9 % (ref 36–48)
HDLC SERPL-MCNC: 58 MG/DL (ref 40–60)
HEMOGLOBIN: 12 G/DL (ref 12–16)
LDL CHOLESTEROL CALCULATED: 81 MG/DL
LV EF: 63 %
LVEF MODALITY: NORMAL
MCH RBC QN AUTO: 31.5 PG (ref 26–34)
MCHC RBC AUTO-ENTMCNC: 34.2 G/DL (ref 31–36)
MCV RBC AUTO: 92.2 FL (ref 80–100)
PDW BLD-RTO: 13.2 % (ref 12.4–15.4)
PLATELET # BLD: 176 K/UL (ref 135–450)
PMV BLD AUTO: 9 FL (ref 5–10.5)
RBC # BLD: 3.79 M/UL (ref 4–5.2)
TRIGL SERPL-MCNC: 61 MG/DL (ref 0–150)
TROPONIN: <0.01 NG/ML
VLDLC SERPL CALC-MCNC: 12 MG/DL
WBC # BLD: 5 K/UL (ref 4–11)

## 2021-06-30 PROCEDURE — 2580000003 HC RX 258: Performed by: INTERNAL MEDICINE

## 2021-06-30 PROCEDURE — 6360000002 HC RX W HCPCS: Performed by: INTERNAL MEDICINE

## 2021-06-30 PROCEDURE — 85027 COMPLETE CBC AUTOMATED: CPT

## 2021-06-30 PROCEDURE — 6370000000 HC RX 637 (ALT 250 FOR IP): Performed by: INTERNAL MEDICINE

## 2021-06-30 PROCEDURE — 93010 ELECTROCARDIOGRAM REPORT: CPT | Performed by: INTERNAL MEDICINE

## 2021-06-30 PROCEDURE — 94760 N-INVAS EAR/PLS OXIMETRY 1: CPT

## 2021-06-30 PROCEDURE — 96372 THER/PROPH/DIAG INJ SC/IM: CPT

## 2021-06-30 PROCEDURE — G0378 HOSPITAL OBSERVATION PER HR: HCPCS

## 2021-06-30 PROCEDURE — 80061 LIPID PANEL: CPT

## 2021-06-30 PROCEDURE — 36415 COLL VENOUS BLD VENIPUNCTURE: CPT

## 2021-06-30 PROCEDURE — 99220 PR INITIAL OBSERVATION CARE/DAY 70 MINUTES: CPT | Performed by: INTERNAL MEDICINE

## 2021-06-30 PROCEDURE — 93005 ELECTROCARDIOGRAM TRACING: CPT | Performed by: INTERNAL MEDICINE

## 2021-06-30 PROCEDURE — 84484 ASSAY OF TROPONIN QUANT: CPT

## 2021-06-30 PROCEDURE — 93306 TTE W/DOPPLER COMPLETE: CPT

## 2021-06-30 PROCEDURE — 97116 GAIT TRAINING THERAPY: CPT | Performed by: PHYSICAL THERAPIST

## 2021-06-30 PROCEDURE — 97161 PT EVAL LOW COMPLEX 20 MIN: CPT | Performed by: PHYSICAL THERAPIST

## 2021-06-30 RX ORDER — LORAZEPAM 0.5 MG/1
0.5 TABLET ORAL 2 TIMES DAILY
Status: DISCONTINUED | OUTPATIENT
Start: 2021-06-30 | End: 2021-07-01 | Stop reason: HOSPADM

## 2021-06-30 RX ORDER — POTASSIUM CHLORIDE 750 MG/1
20 TABLET, FILM COATED, EXTENDED RELEASE ORAL
Status: DISCONTINUED | OUTPATIENT
Start: 2021-06-30 | End: 2021-07-01 | Stop reason: HOSPADM

## 2021-06-30 RX ORDER — LORAZEPAM 0.5 MG/1
0.5 TABLET ORAL EVERY 8 HOURS PRN
Status: DISCONTINUED | OUTPATIENT
Start: 2021-06-30 | End: 2021-07-01 | Stop reason: HOSPADM

## 2021-06-30 RX ADMIN — Medication 10 ML: at 08:16

## 2021-06-30 RX ADMIN — ASPIRIN 81 MG: 81 TABLET, CHEWABLE ORAL at 08:12

## 2021-06-30 RX ADMIN — I-VITE, TAB 1000-60-2MG (60/BT) 1 TABLET: TAB at 08:12

## 2021-06-30 RX ADMIN — PANTOPRAZOLE SODIUM 40 MG: 40 TABLET, DELAYED RELEASE ORAL at 06:11

## 2021-06-30 RX ADMIN — LATANOPROST 1 DROP: 50 SOLUTION OPHTHALMIC at 20:24

## 2021-06-30 RX ADMIN — Medication 10 ML: at 20:24

## 2021-06-30 RX ADMIN — METOPROLOL SUCCINATE 25 MG: 25 TABLET, EXTENDED RELEASE ORAL at 08:13

## 2021-06-30 RX ADMIN — ISOSORBIDE MONONITRATE 30 MG: 30 TABLET, EXTENDED RELEASE ORAL at 08:12

## 2021-06-30 RX ADMIN — ATORVASTATIN CALCIUM 40 MG: 40 TABLET, FILM COATED ORAL at 20:24

## 2021-06-30 RX ADMIN — AMLODIPINE BESYLATE 2.5 MG: 5 TABLET ORAL at 08:12

## 2021-06-30 RX ADMIN — FUROSEMIDE 20 MG: 20 TABLET ORAL at 08:12

## 2021-06-30 RX ADMIN — POTASSIUM CHLORIDE 20 MEQ: 750 TABLET, FILM COATED, EXTENDED RELEASE ORAL at 08:13

## 2021-06-30 RX ADMIN — ENOXAPARIN SODIUM 40 MG: 40 INJECTION SUBCUTANEOUS at 08:12

## 2021-06-30 RX ADMIN — ESCITALOPRAM OXALATE 5 MG: 10 TABLET ORAL at 08:12

## 2021-06-30 RX ADMIN — LORAZEPAM 0.5 MG: 0.5 TABLET ORAL at 08:13

## 2021-06-30 RX ADMIN — LORAZEPAM 0.5 MG: 0.5 TABLET ORAL at 20:24

## 2021-06-30 ASSESSMENT — PAIN SCALES - GENERAL
PAINLEVEL_OUTOF10: 0

## 2021-06-30 NOTE — H&P
[address] [facility]                              HISTORY AND PHYSICAL    PATIENT NAME: Maria G Mendez                 :        1929  MED REC NO:   [id number]                          ROOM:       5109  ACCOUNT NO:   [de-identified]                           ADMIT DATE: 2021  PROVIDER:     Zina Herrera MD    CHIEF COMPLAINT:  Chest pain. HISTORY OF PRESENT ILLNESS:  The patient is a 79-year-old white female  admitted through emergency. She presented from home with a vague rather  nebulous history of chest pain. She describes it as an aching in her  anterior chest.  She is a very wandering historian and said that she was  feeling a twinge of pain in the center of her chest.  It sometimes  radiated to her arm. It did not make her short of breath or sweaty. It  came on at rest.  She had some physical therapy earlier in the day. She  also was in the hospital recently at [facility] for again nonspecific  complaints of some blurred vision, weakness. Full neurologic and  cardiac evaluation was done and nothing was found. She said she could  not be cared for at home so she went to [facility] for three or four  days and then came home and now is again calling, complaining of chest  pain. Her pain does not sound cardiac. There are no associated  symptoms. It is not related to exertion. Initial troponin was  negative. I did discuss with the daughter by phone this morning that I  think she does have some anxiety and ongoing memory loss and I think she  has a lot of somatic complaints which are going to be difficult to  manage unless she is in an extended care facility. PAST MEDICAL HISTORY:  Includes gastroesophageal reflux, hyperlipidemia,  hypertension, and arthritis.     PAST SURGICAL HISTORY:  Includes cataract removal, hernia repair,  hysterectomy, right total knee arthroplasty, varicose vein surgery. ADMISSION MEDICATIONS:  Included amlodipine, aspirin, escitalopram,  Estrace vaginal cream, Lasix, lorazepam b.i.d. and p.r.n., metoprolol,  PreserVision, pantoprazole, potassium, and MiraLax. ALLERGIES:  Include CIPRO and PERCOCET. FAMILY HISTORY:  Noncontributory. SOCIAL HISTORY:  She is not a smoker or drinker. PHYSICAL EXAMINATION:  VITAL SIGNS:  Blood pressure 144/69, pulse 77, respirations were 20, O2  sat was 96% on room air. GENERAL:  When visited by Dr. Melody Collins, she was lying in bed, in no  acute distress, would awaken and converse. HEENT:  Head is normocephalic, atraumatic. Pupils are equal, round,  reactive to light and accommodation. Extraocular muscles intact. NECK:  Supple without JVD. CHEST:  Clear to auscultation and percussion. CARDIOVASCULAR:  Regular rate and rhythm. ABDOMEN:  Soft, nontender without mass or organomegaly. EXTREMITIES:  There was the subjective complaint of pain in her right  thigh and knee. It moved freely without evidence of deep venous  thrombosis. NEUROLOGIC:  She was somewhat histrionic and would wander from subject  to subject in her history. There were no focal neurologic deficits. She did appear somewhat anxious through the hospital course and somewhat  confused as to specific dates of her recent hospitalizations. LABORATORY DATA:  BUN and creatinine 10 and 0.6, potassium 3.5, sodium  138. Troponin less than 0.01. White count 5000, hemoglobin 12.8,  platelet count of 320,187. Urinalysis is unremarkable. EKG shows a normal sinus rhythm with no acute changes. Echocardiogram has been ordered in the ER and is still pending at this  time. IMPRESSION:  1. Chest pain, appears atypical, at rest.  No associated symptoms. We  will follow serial troponins. 2.  Anxiety. Hospitalized last week with similar very vague symptoms. We will reinstitute her lorazepam and start PT and OT. 3.  Hypercholesterolemia.   4. Hypertension. PLAN:  Observation. Serial troponins. Social Service to review with  family regarding home versus ECF.   I will do serial troponins and an  echocardiogram.        Olivia Krishna MD    D: 06/30/2021 8:15:21       T: 06/30/2021 12:42:02     [id number]  Job#: [id number]     Doc#: [id number]    CC:  Zina Herrera MD

## 2021-06-30 NOTE — PROGRESS NOTES
Narcotic Waste Documentation    Administered 0.5 mg of Ativan and wasted 0.5 mg per [facility].     Electronically signed by Han Moeller RN on 6/30/2021 at 8:24 AM    Electronically signed by Rosangela Germain RN on 6/30/2021 at 8:25 AM

## 2021-06-30 NOTE — PROGRESS NOTES
4 Eyes Admission Assessment     I agree as the admission nurse that 2 RN's have performed a thorough Head to Toe Skin Assessment on the patient. ALL assessment sites listed below have been assessed on admission. Areas assessed by both nurses:  [x]   Head, Face, and Ears   [x]   Shoulders, Back, and Chest  [x]   Arms, Elbows, and Hands   [x]   Coccyx, Sacrum, and Ischum  [x]   Legs, Feet, and Heels        Does the Patient have Skin Breakdown?   No         Mohsen Prevention initiated:  No   Wound Care Orders initiated:  No      [facility] nurse consulted for Pressure Injury (Stage 3,4, Unstageable, DTI, NWPT, and Complex wounds):  No      Nurse 1 eSignature: Electronically signed by Yoselin Ortega RN on 6/30/21 at 3:25 AM EDT    **SHARE this note so that the co-signing nurse is able to place an eSignature**    Nurse 2 eSignature: Electronically signed by Melissa Braun RN on 6/30/21 at 3:35 AM EDT

## 2021-06-30 NOTE — PROGRESS NOTES
Physical Therapy    Facility/Department: [address] PROGRESSIVE CARE  Initial Assessment    NAME: Lay Horvath  : 3/11/1929  MRN: [id number]    Date of Service: 2021    Discharge Recommendations:  Continue to assess pending progress   PT Equipment Recommendations  Equipment Needed: No  Lay Horvath scored a 19/24 on the AM-PAC short mobility form. Current research shows that an AM-PAC score of 18 or greater is typically associated with a discharge to the patient's home setting. Pt lives alone and does not appear to be safe at home due to some cognitive deficits/decreased insight into safety. Pt would benefit from continued therapy at discharge. Please see assessment section for further patient specific details. If patient discharges prior to next session this note will serve as a discharge summary. Please see below for the latest assessment towards goals. Assessment   Body structures, Functions, Activity limitations: Decreased functional mobility   Assessment: pt is a 81 yo female who was adm to hosp with chest pain. Pt at baseline lives alone and is Ind with functional tasks; pt is forgetful and reports she was at home and thought she was still at the SNF and then when she needed to call EMS for her chest pain she instead called home health and asked them to call 911 as she didn't have the number handy (this is per pt self report). Pt appears close to her baseline for functional mobility tasks however may need to look into assisted living as pt's family on last adm reported they could not provide more assist.  Will continue to follow while in hosp  Prognosis: Fair  Decision Making: Medium Complexity  PT Education: PT Role;General Safety  Barriers to Learning: forgetful  REQUIRES PT FOLLOW UP: Yes  Activity Tolerance  Activity Tolerance: Patient Tolerated treatment well       Patient Diagnosis(es): The encounter diagnosis was Chest pain, unspecified type.      has a past medical history of Arthritis, Blood circulation, collateral, Cataract, Clostridium difficile infection, GERD (gastroesophageal reflux disease), Glaucoma, Hyperlipidemia, Hypertension, Macular degeneration, and UTI (urinary tract infection). has a past surgical history that includes Cataract removal (Bilateral, 2008); Total knee arthroplasty; Hysterectomy (1980); Varicose vein surgery (1967 let[place], 1984 left.); Colonoscopy; hernia repair (Left, 06/08/2018); and hernia repair (Right, 6/4/2020). Restrictions     Vision/Hearing  Vision: Impaired  Vision Exceptions: Wears glasses at all times  Hearing: Exceptions to [facility]  Hearing Exceptions: Hard of hearing/hearing concerns     Subjective  General  Chart Reviewed:  Yes  Additional Pertinent Hx: pt is a 81 yo female who was adm  to [facility] with chest pain; pt had previously been adm to [facility] whent to a SNF for 3 days then home for approx 1 week prior to return to [facility]  Response To Previous Treatment: Not applicable  Family / Caregiver Present: Yes (daughter and son in law in at end of sesion)  Referring Practitioner: Dr Monalisa Mcdonald  Referral Date : 06/30/21  Follows Commands: Within Functional Limits  General Comment  Comments: pt forgetful and has decreased insight into safety; reports at home she had chest pain so she called home health who told her to call 911 but she said she asked them to call as she didn't have the number; pt denies any pain  Subjective  Subjective: pt very pleasant and agreeable to working with therapy  Pain Screening  Patient Currently in Pain: No          Orientation  Orientation  Overall Orientation Status: Within Functional Limits  Social/Functional History  Social/Functional History  Lives With: Alone  Type of Home: House  Home Layout: Two level, Bed/Bath upstairs, Laundry in basement, 1/2 bath on main level  Home Access: Stairs to enter with rails  Entrance Stairs - Number of Steps: 1+1  Entrance Stairs - Rails: Right  Bathroom Shower/Tub: Walk-in shower  Bathroom Toilet: Standard  Bathroom Equipment: Grab bars in shower, Shower chair, Grab bars around toilet  Home Equipment: Rolling walker, Cane  ADL Assistance: Independent  Ambulation Assistance: Independent (with RW)  Transfer Assistance: Independent  Active : No  Cognition   Cognition  Overall Cognitive Status: WFL    Objective          AROM RLE (degrees)  RLE AROM: WFL  AROM LLE (degrees)  LLE AROM : WFL  Strength RLE  Strength RLE: WFL  Strength LLE  Strength LLE: WFL        Bed mobility  Supine to Sit: Stand by assistance  Transfers  Sit to Stand: Stand by assistance;Contact guard assistance  Stand to sit: Stand by assistance;Contact guard assistance  Ambulation  Ambulation?: Yes  More Ambulation?: Yes  Ambulation 1  Surface: level tile  Device: Rolling Walker  Assistance: Stand by assistance  Quality of Gait: slow pace, no LOB noted  Distance: 22' x2 with rest break between to use commode and brush her teeth  Ambulation 2  Surface - 2: level tile  Device 2: No device  Assistance 2: Contact guard assistance;Stand by assistance  Distance: 35'  Comments: pt walks with high guard position due to feeling a little unsteady     Balance  Sitting - Static: Good  Sitting - Dynamic: Good  Standing - Static: Fair;+;Good;-  Standing - Dynamic: Fair;+        Plan   Plan  Times per week: 2-3x/wk  Current Treatment Recommendations: Functional Mobility Training  Safety Devices  Type of devices:  All fall risk precautions in place, Chair alarm in place, Left in chair, Call light within reach, Nurse notified    G-Code       OutComes Score                                                  AM-PAC Score  AM-PAC Inpatient Mobility Raw Score : 19 (06/30/21 1308)  AM-PAC Inpatient T-Scale Score : 45.44 (06/30/21 1308)  Mobility Inpatient CMS 0-100% Score: 41.77 (06/30/21 1308)  Mobility Inpatient CMS G-Code Modifier : CK (06/30/21 1308)          Goals  Short term goals  Time Frame for Short term goals: by discharge  Short term goal 1: transfers MI  Short term goal 2: amb 100' with RW sup  Patient Goals   Patient goals : to return home       Therapy Time   Individual Concurrent Group Co-treatment   Time In 1004         Time Out 1035         Minutes 31                 SOTERO FOY PT    Electronically signed by SOTERO FOY PT on 6/30/2021 at 1:10 PM

## 2021-06-30 NOTE — H&P
H&P dictated--IMP: Principal Problem:    Chest pain--ant chest--at rest--no assoc sx--trop neg--  Active Problems:    Anxiety--incr sx--hosp last week with valentino almaguer sx--blurred vision,melissa discomfort and weakness---went to [facility] x 3 days then home     Pure hypercholesterolemia--Continue current therapy      Essential hypertension --Continue current therapy      Abnormal glucose--Continue current therapy    Resolved Problems:    * No resolved hospital problems.  *    Admit--serial troponin--soc swerv to review with family regarding home vs ecf vs ??--I spoke to kevon--she is frustrated --not sure what dc option she will choose     Irma Clifford MD

## 2021-06-30 NOTE — PLAN OF CARE
Problem: Pain:  Goal: Pain level will decrease  Description: Pain level will decrease  6/30/2021 0322 by Denilson Perla RN  Outcome: Ongoing  6/30/2021 0321 by Denilson Perla RN  Outcome: Ongoing  Goal: Control of acute pain  Description: Control of acute pain  6/30/2021 0322 by Denilson Perla RN  Outcome: Ongoing  6/30/2021 0321 by Denilson Perla RN  Outcome: Ongoing  Goal: Control of chronic pain  Description: Control of chronic pain  6/30/2021 0322 by Denilson Perla RN  Outcome: Ongoing  6/30/2021 0321 by Denilson Perla RN  Outcome: Ongoing     Problem: SAFETY  Goal: Free from accidental physical injury  Outcome: Ongoing  Goal: Free from intentional harm  Outcome: Ongoing

## 2021-07-01 VITALS
HEART RATE: 60 BPM | BODY MASS INDEX: 27.44 KG/M2 | RESPIRATION RATE: 19 BRPM | DIASTOLIC BLOOD PRESSURE: 72 MMHG | OXYGEN SATURATION: 96 % | HEIGHT: 60 IN | WEIGHT: 139.77 LBS | SYSTOLIC BLOOD PRESSURE: 148 MMHG | TEMPERATURE: 98.7 F

## 2021-07-01 LAB
ANION GAP SERPL CALCULATED.3IONS-SCNC: 8 MMOL/L (ref 3–16)
BUN BLDV-MCNC: 12 MG/DL (ref 7–20)
CALCIUM SERPL-MCNC: 8.8 MG/DL (ref 8.3–10.6)
CHLORIDE BLD-SCNC: 105 MMOL/L (ref 99–110)
CO2: 22 MMOL/L (ref 21–32)
CREAT SERPL-MCNC: 0.6 MG/DL (ref 0.6–1.2)
GFR AFRICAN AMERICAN: >60
GFR NON-AFRICAN AMERICAN: >60
GLUCOSE BLD-MCNC: 89 MG/DL (ref 70–99)
POTASSIUM SERPL-SCNC: 4.8 MMOL/L (ref 3.5–5.1)
SODIUM BLD-SCNC: 135 MMOL/L (ref 136–145)

## 2021-07-01 PROCEDURE — G0378 HOSPITAL OBSERVATION PER HR: HCPCS

## 2021-07-01 PROCEDURE — 80048 BASIC METABOLIC PNL TOTAL CA: CPT

## 2021-07-01 PROCEDURE — 6370000000 HC RX 637 (ALT 250 FOR IP): Performed by: INTERNAL MEDICINE

## 2021-07-01 PROCEDURE — 94761 N-INVAS EAR/PLS OXIMETRY MLT: CPT

## 2021-07-01 PROCEDURE — 36415 COLL VENOUS BLD VENIPUNCTURE: CPT

## 2021-07-01 PROCEDURE — 97165 OT EVAL LOW COMPLEX 30 MIN: CPT

## 2021-07-01 PROCEDURE — 97535 SELF CARE MNGMENT TRAINING: CPT

## 2021-07-01 PROCEDURE — 97116 GAIT TRAINING THERAPY: CPT | Performed by: PHYSICAL THERAPIST

## 2021-07-01 PROCEDURE — 2580000003 HC RX 258: Performed by: INTERNAL MEDICINE

## 2021-07-01 PROCEDURE — 99217 PR OBSERVATION CARE DISCHARGE MANAGEMENT: CPT | Performed by: INTERNAL MEDICINE

## 2021-07-01 RX ADMIN — ESCITALOPRAM OXALATE 5 MG: 10 TABLET ORAL at 08:27

## 2021-07-01 RX ADMIN — METOPROLOL SUCCINATE 25 MG: 25 TABLET, EXTENDED RELEASE ORAL at 08:28

## 2021-07-01 RX ADMIN — I-VITE, TAB 1000-60-2MG (60/BT) 1 TABLET: TAB at 08:28

## 2021-07-01 RX ADMIN — PANTOPRAZOLE SODIUM 40 MG: 40 TABLET, DELAYED RELEASE ORAL at 06:21

## 2021-07-01 RX ADMIN — Medication 10 ML: at 08:29

## 2021-07-01 RX ADMIN — POTASSIUM CHLORIDE 20 MEQ: 750 TABLET, FILM COATED, EXTENDED RELEASE ORAL at 08:28

## 2021-07-01 RX ADMIN — AMLODIPINE BESYLATE 2.5 MG: 5 TABLET ORAL at 08:28

## 2021-07-01 RX ADMIN — LORAZEPAM 0.5 MG: 0.5 TABLET ORAL at 08:28

## 2021-07-01 RX ADMIN — ISOSORBIDE MONONITRATE 30 MG: 30 TABLET, EXTENDED RELEASE ORAL at 08:28

## 2021-07-01 RX ADMIN — ASPIRIN 81 MG: 81 TABLET, CHEWABLE ORAL at 08:27

## 2021-07-01 RX ADMIN — FUROSEMIDE 20 MG: 20 TABLET ORAL at 08:28

## 2021-07-01 NOTE — CARE COORDINATION
[facility]    Referral received from CM to follow for home care services. I will follow for needs, and speak with patient to verify demos. Home today at . Pck 125 approved and aware.         Patrick Bassett  Work mobile: [phone]  [facility] office: [phone]

## 2021-07-01 NOTE — PROGRESS NOTES
Libby DARNELL [facility] Progress Note  7/1/2021 8:29 AM  Subjective:   Admit Date: 6/29/2021      Chief Complaint: I feel OK     Interval History: NO further chest pain   Echo--wnl   OT has 21/24--no additl tx needed     Diet: ADULT DIET; Regular; No Added Salt (3-4 gm); No Caffeine  Medications:   Scheduled Meds:   LORazepam  0.5 mg Oral BID    potassium chloride  20 mEq Oral Daily with breakfast    amLODIPine  2.5 mg Oral Daily    aspirin  81 mg Oral Daily    escitalopram  5 mg Oral Daily    furosemide  20 mg Oral Daily    latanoprost  1 drop Both Eyes Nightly    metoprolol succinate  25 mg Oral Daily    I-Yessica  1 tablet Oral Daily    pantoprazole  40 mg Oral QAM AC    sodium chloride flush  5-40 mL Intravenous 2 times per day    atorvastatin  40 mg Oral Nightly    enoxaparin  40 mg Subcutaneous Daily    isosorbide mononitrate  30 mg Oral Daily     Continuous Infusions:   sodium chloride         Review of Systems -   General ROS: afebrile  Respiratory ROS: no cough, shortness of breath or wheezing  Cardiovascular ROS: no chest pain  Musculoskeletal ROS:positive for - rt thigh pain   Neurological ROS: no TIA or stroke symptoms    Objective:   Vitals: BP (!) 143/80   Pulse 55   Temp 98 °F (36.7 °C) (Oral)   Resp 18   Ht 5' (1.524 m)   Wt 139 lb 12.4 oz (63.4 kg)   SpO2 96%   BMI 27.30 kg/m²   General appearance: alert and cooperative with exam  HEENT: Head: Normocephalic, no lesions, without obvious abnormality.   Neck: no adenopathy, no carotid bruit, no JVD, supple, symmetrical, trachea midline and thyroid not enlarged, symmetric, no tenderness/mass/nodules  Lungs: clear to auscultation bilaterally  Heart: regular rate and rhythm, S1, S2 normal, no murmur, click, rub or gallop  Abdomen: soft, non-tender; bowel sounds normal; no masses,  no organomegaly  Extremities: extremities normal, atraumatic, no cyanosis or edema  Neurologic: Mental status: anxious--mult concerns     Admission on 06/29/2021 Component Date Value Ref Range Status    Ventricular Rate 06/29/2021 66  BPM Final    Atrial Rate 06/29/2021 66  BPM Final    P-R Interval 06/29/2021 152  ms Final    QRS Duration 06/29/2021 78  ms Final    Q-T Interval 06/29/2021 394  ms Final    QTc Calculation (Bazett) 06/29/2021 413  ms Final    P Axis 06/29/2021 15  degrees Final    R Axis 06/29/2021 -5  degrees Final    T Axis 06/29/2021 5  degrees Final    Diagnosis 06/29/2021 Normal sinus rhythmNonspecific T wave abnormalityBorderline ECGWhen compared with ECG of 14-JUN-2021 11:19,Questionable change in initial forces of Anterior leads  and inferior leadsConfirmed by Jeannette PERES ([id number]) on 6/30/2021 7:25:22 PM   Final    WBC 06/29/2021 5.4  4.0 - 11.0 K/uL Final    RBC 06/29/2021 3.97* 4.00 - 5.20 M/uL Final    Hemoglobin 06/29/2021 12.8  12.0 - 16.0 g/dL Final    Hematocrit 06/29/2021 36.4  36.0 - 48.0 % Final    MCV 06/29/2021 91.5  80.0 - 100.0 fL Final    MCH 06/29/2021 32.1  26.0 - 34.0 pg Final    MCHC 06/29/2021 35.1  31.0 - 36.0 g/dL Final    RDW 06/29/2021 13.1  12.4 - 15.4 % Final    Platelets 43/14/6962 196  135 - 450 K/uL Final    MPV 06/29/2021 8.5  5.0 - 10.5 fL Final    Neutrophils % 06/29/2021 67.8  % Final    Lymphocytes % 06/29/2021 19.8  % Final    Monocytes % 06/29/2021 11.6  % Final    Eosinophils % 06/29/2021 0.5  % Final    Basophils % 06/29/2021 0.3  % Final    Neutrophils Absolute 06/29/2021 3.6  1.7 - 7.7 K/uL Final    Lymphocytes Absolute 06/29/2021 1.1  1.0 - 5.1 K/uL Final    Monocytes Absolute 06/29/2021 0.6  0.0 - 1.3 K/uL Final    Eosinophils Absolute 06/29/2021 0.0  0.0 - 0.6 K/uL Final    Basophils Absolute 06/29/2021 0.0  0.0 - 0.2 K/uL Final    Sodium 06/29/2021 138  136 - 145 mmol/L Final    Potassium reflex Magnesium 06/29/2021 3.5  3.5 - 5.1 mmol/L Final    Chloride 06/29/2021 104  99 - 110 mmol/L Final    CO2 06/29/2021 22  21 - 32 mmol/L Final    Anion Gap 06/29/2021 12  3 - 16 Final    Glucose 06/29/2021 97  70 - 99 mg/dL Final    BUN 06/29/2021 10  7 - 20 mg/dL Final    CREATININE 06/29/2021 0.6  0.6 - 1.2 mg/dL Final    GFR Non- 06/29/2021 >60  >60 Final    Comment: >60 mL/min/1.73m2 EGFR, calc. for ages 25 and older using the  MDRD formula (not corrected for weight), is valid for stable  renal function.  GFR  06/29/2021 >60  >60 Final    Comment: Chronic Kidney Disease: less than 60 ml/min/1.73 sq.m. Kidney Failure: less than 15 ml/min/1.73 sq.m. Results valid for patients 18 years and older.  Calcium 06/29/2021 8.8  8.3 - 10.6 mg/dL Final    Troponin 06/29/2021 <0.01  <0.01 ng/mL Final    Methodology by Troponin T    Color, UA 06/29/2021 YELLOW  Straw/Yellow Final    Clarity, UA 06/29/2021 CLOUDY* Clear Final    Glucose, Ur 06/29/2021 Negative  Negative mg/dL Final    Bilirubin Urine 06/29/2021 Negative  Negative Final    Ketones, Urine 06/29/2021 15* Negative mg/dL Final    Specific [place], UA 06/29/2021 1.006  1.005 - 1.030 Final    Blood, Urine 06/29/2021 Negative  Negative Final    pH, UA 06/29/2021 6.0  5.0 - 8.0 Final    Protein, UA 06/29/2021 Negative  Negative mg/dL Final    Urobilinogen, Urine 06/29/2021 0.2  <2.0 E.U./dL Final    Nitrite, Urine 06/29/2021 Negative  Negative Final    Leukocyte Esterase, Urine 06/29/2021 Negative  Negative Final    Microscopic Examination 06/29/2021 YES   Final    Urine Type 06/29/2021 NotGiven   Final    Urine Reflex to Culture 06/29/2021 Not Indicated   Final    Magnesium 06/29/2021 2.00  1.80 - 2.40 mg/dL Final    Hyaline Casts, UA 06/29/2021 0  0 - 8 /LPF Final    WBC, UA 06/29/2021 0  0 - 5 /HPF Final    RBC, UA 06/29/2021 1  0 - 4 /HPF Final    Epithelial Cells, UA 06/29/2021 1  0 - 5 /HPF Final    Comment: Urinalysis microscopic performed using the  automated methodology (AUWI analyzer).       Troponin 06/30/2021 <0.01  <0.01 ng/mL Final    Methodology by Troponin T    Troponin 06/30/2021 <0.01  <0.01 ng/mL Final    Methodology by Troponin T    WBC 06/30/2021 5.0  4.0 - 11.0 K/uL Final    RBC 06/30/2021 3.79* 4.00 - 5.20 M/uL Final    Hemoglobin 06/30/2021 12.0  12.0 - 16.0 g/dL Final    Hematocrit 06/30/2021 34.9* 36.0 - 48.0 % Final    MCV 06/30/2021 92.2  80.0 - 100.0 fL Final    MCH 06/30/2021 31.5  26.0 - 34.0 pg Final    MCHC 06/30/2021 34.2  31.0 - 36.0 g/dL Final    RDW 06/30/2021 13.2  12.4 - 15.4 % Final    Platelets 85/50/1746 176  135 - 450 K/uL Final    MPV 06/30/2021 9.0  5.0 - 10.5 fL Final    Cholesterol, Total 06/30/2021 151  0 - 199 mg/dL Final    Triglycerides 06/30/2021 61  0 - 150 mg/dL Final    HDL 06/30/2021 58  40 - 60 mg/dL Final    LDL Calculated 06/30/2021 81  <100 mg/dL Final    VLDL Cholesterol Calculated 06/30/2021 12  Not Established mg/dL Final    Ventricular Rate 06/30/2021 61  BPM Final    Atrial Rate 06/30/2021 61  BPM Final    P-R Interval 06/30/2021 160  ms Final    QRS Duration 06/30/2021 70  ms Final    Q-T Interval 06/30/2021 384  ms Final    QTc Calculation (Bazett) 06/30/2021 386  ms Final    P Axis 06/30/2021 4  degrees Final    R Axis 06/30/2021 -12  degrees Final    T Axis 06/30/2021 -3  degrees Final    Diagnosis 06/30/2021 Normal sinus rhythmCannot rule out Septal infarct (cited on or before 09-OCT-2020)Abnormal ECGWhen compared with ECG of 14-JUN-2021 11:19,Questionable change in initial forces of Anterior leadsConfirmed by Felix PERES ([id number]) on 6/30/2021 7:27:38 PM   Final    Troponin 06/30/2021 <0.01  <0.01 ng/mL Final    Methodology by Troponin T    Ventricular Rate 06/29/2021 62  BPM Final    Atrial Rate 06/29/2021 62  BPM Final    P-R Interval 06/29/2021 126  ms Final    QRS Duration 06/29/2021 66  ms Final    Q-T Interval 06/29/2021 394  ms Final    QTc Calculation (Bazett) 06/29/2021 399  ms Final    P Axis 06/29/2021 27  degrees Final    R Axis 06/29/2021 -5  degrees Final  T Axis 06/29/2021 15  degrees Final    Diagnosis 06/29/2021 Sinus rhythm with baseline artifactNonspecific T wave abnormalityBorderline ECGWhen compared with ECG of 29-JUN-2021 17:22,No significant change was foundConfirmed by Raleigh PERES ([id number]) on 6/30/2021 7:27:20 PM   Final    Sodium 07/01/2021 135* 136 - 145 mmol/L Final    Potassium 07/01/2021 4.8  3.5 - 5.1 mmol/L Final    Chloride 07/01/2021 105  99 - 110 mmol/L Final    CO2 07/01/2021 22  21 - 32 mmol/L Final    Anion Gap 07/01/2021 8  3 - 16 Final    Glucose 07/01/2021 89  70 - 99 mg/dL Final    BUN 07/01/2021 12  7 - 20 mg/dL Final    CREATININE 07/01/2021 0.6  0.6 - 1.2 mg/dL Final    GFR Non- 07/01/2021 >60  >60 Final    Comment: >60 mL/min/1.73m2 EGFR, calc. for ages 25 and older using the  MDRD formula (not corrected for weight), is valid for stable  renal function.  GFR  07/01/2021 >60  >60 Final    Comment: Chronic Kidney Disease: less than 60 ml/min/1.73 sq.m. Kidney Failure: less than 15 ml/min/1.73 sq.m. Results valid for patients 18 years and older.  Calcium 07/01/2021 8.8  8.3 - 10.6 mg/dL Final         Assessment & Plan:   Principal Problem:    Chest pain--non cardiac--DC to home today --will ask [facility] to see   Active Problems:    Anxiety--cont lorazepam     Pure hypercholesterolemia    Essential hypertension--Continue current therapy      Abnormal glucose  Resolved Problems:    * No resolved hospital problems. *  DC to home today with [facility] --I have recc IDL/ASL in past to her--kevon ??  Long term care coverage--I recc review with social service    DS dictated -  Please note that over 35 minutes was spent in evaluating the patient, review of records and results, discussion with staff/family, etc.    Radha Kline MD

## 2021-07-01 NOTE — CARE COORDINATION
Count includes the [facility]    DC order noted, all docs needed have been faxed to [facility] for home care services.         Patrick Bassett  Work mobile: [phone]  [facility] office: [phone]

## 2021-07-01 NOTE — DISCHARGE INSTR - COC
Fluarix, and 3 yrs and older Afluria) 11/09/2016    Influenza, Quadv, adjuvanted, 65 yrs +, IM, PF (Fluad) 10/20/2020    Influenza, Triv, inactivated, subunit, adjuvanted, IM (Fluad 65 yrs and older) 11/15/2019    Pneumococcal Conjugate 13-valent ([id number]) 03/14/2016    Pneumococcal Polysaccharide ([id number]) 12/07/2010    Tdap (Boostrix, Adacel) 10/21/2015    Zoster Live (Zostavax) 12/07/2010, 05/11/2011       Active Problems:  Patient Active Problem List   Diagnosis Code    Glaucoma H40.9    Osteopenia M85.80    Esophageal reflux K21.9    Pure hypercholesterolemia E78.00    Anxiety F41.9    Essential hypertension I10    Weakness R53.1    Primary osteoarthritis of left knee M17.12    Ventral hernia without obstruction or gangrene K43.9    Abnormal glucose R73.09    Dysuria R30.0    Dizziness R42    Abdominal lump R19.00    Precordial pain R07.2    Blurred vision, bilateral H53.8    Pain of right thigh M79.651    Chest pain R07.9       Isolation/Infection:   Isolation            No Isolation          Patient Infection Status       Infection Onset Added Last Indicated Last Indicated By Review Planned Expiration Resolved Resolved By    None active    Resolved    COVID-19 Rule Out 06/14/21 06/14/21 06/14/21 COVID-19, Rapid (Ordered)   06/14/21 Rule-Out Test Resulted    COVID-19 Rule Out 05/29/20 05/29/20 05/29/20 COVID-19 Ambulatory (Ordered)   06/01/20 Rule-Out Test Resulted            Nurse Assessment:  Last Vital Signs: BP (!) 143/80   Pulse 55   Temp 98 °F (36.7 °C) (Oral)   Resp 18   Ht 5' (1.524 m)   Wt 139 lb 12.4 oz (63.4 kg)   SpO2 96%   BMI 27.30 kg/m²     Last documented pain score (0-10 scale): Pain Level: 0  Last Weight:   Wt Readings from Last 1 Encounters:   07/01/21 139 lb 12.4 oz (63.4 kg)     Mental Status:  oriented and alert    IV Access:  - None    Nursing Mobility/ADLs:  Walking   Assisted  Transfer  Independent  Bathing  Independent  Dressing [facility] Delivery   whole    Wound Care Documentation and Therapy:        Elimination:  Continence:   · Bowel: Yes  · Bladder: Yes  Urinary Catheter: None   Colostomy/Ileostomy/Ileal Conduit: No       Date of Last BM: 06/30    Intake/Output Summary (Last 24 hours) at 7/1/2021 0845  Last data filed at 6/30/2021 1340  Gross per 24 hour   Intake 240 ml   Output 320 ml   Net -80 ml     I/O last 3 completed shifts: In: 480 [P.O.:480]  Out: 320 [Urine:320]    Safety Concerns:     None    Impairments/Disabilities:      None    Nutrition Therapy:  Current Nutrition Therapy:   - Oral Diet:  General and Low Sodium (3-4gm)    Routes of Feeding: Oral  Liquids: No Restrictions  Daily Fluid Restriction: no  Last Modified Barium Swallow with Video (Video Swallowing Test): not done    Treatments at the Time of Hospital Discharge:   Respiratory Treatments:   Oxygen Therapy:  is not on home oxygen therapy.   Ventilator:    - No ventilator support    Rehab Therapies: Physical Therapy and Occupational Therapy, Skilled Nurse, HHA  Weight Bearing Status/Restrictions: No weight bearing restirctions  Other Medical Equipment (for information only, NOT a DME order):  walker  Other Treatments:     Patient's personal belongings (please select all that are sent with patient):  None    RN SIGNATURE:  Electronically signed by Edison Yoo RN on 7/1/21 at 10:52 AM EDT    CASE MANAGEMENT/SOCIAL WORK SECTION    Inpatient Status Date: ***    Readmission Risk Assessment Score:  Readmission Risk              Risk of Unplanned Readmission:  0         Discharging to Facility/ Agency   Name:  [facility] care    Address: [address] [place] [address]  Phone: [phone]  Fax: [phone]      / signature: Electronically signed by SLOAN Carolina on 7/1/2021 at 9:37 AM    PHYSICIAN SECTION    Prognosis: Fair    Condition at Discharge: Stable    Rehab Potential (if transferring to Rehab): Fair    Recommended Labs or Other Treatments After Discharge: Home care , PT, OT VN, Aides. Physician Certification: I certify the above information and transfer of Koki Gonzalez  is necessary for the continuing treatment of the diagnosis listed and that she requires [address] for less 30 days.      Update Admission H&P: No change in H&P    PHYSICIAN SIGNATURE:  Electronically signed by Madie Mg MD on 7/1/21 at 8:45 AM EDT

## 2021-07-01 NOTE — PLAN OF CARE
Problem: Skin Integrity:  Goal: Will show no infection signs and symptoms  Description: Will show no infection signs and symptoms  Outcome: Ongoing  Goal: Absence of new skin breakdown  Description: Absence of new skin breakdown  Outcome: Ongoing     Problem: Pain:  Goal: Pain level will decrease  Description: Pain level will decrease  Outcome: Ongoing  Goal: Control of acute pain  Description: Control of acute pain  Outcome: Ongoing  Goal: Control of chronic pain  Description: Control of chronic pain  Outcome: Ongoing     Problem: SAFETY  Goal: Free from accidental physical injury  Outcome: Ongoing  Goal: Free from intentional harm  Outcome: Ongoing

## 2021-07-01 NOTE — PROGRESS NOTES
Occupational Therapy   Occupational Therapy Initial Assessment and Tentative D/C    This note to serve as d/c summary should pt d/c prior to next session. Date: 2021   Patient Name: Jeni Galdamez  MRN: [id number]     : 3/11/1929    Date of Service: 2021    Discharge Recommendations: Jeni Galdamez scored a 21/24 on the AM-PAC ADL Inpatient form. At this time, no further OT is recommended upon discharge due to anticipate pt safe to return home with PRN assist.  Recommend patient returns to prior setting with prior services. Continue to assess pending progress, Home with assist PRN  OT Equipment Recommendations  Equipment Needed: No    Assessment   Performance deficits / Impairments: Decreased functional mobility ; Decreased endurance;Decreased ADL status; Decreased balance;Decreased high-level IADLs  Assessment: Jeni Galdamez is a 80 y.o. female who presents stating that about an hour before coming in she started having some pain across the center of her chest.  Eventually an animal is called and they came to her house and brought her in. On the way they gave her some aspirin and nitro and she states now the pain that was there when she was at home is not there. She still feels a twinge of something in the center chest from time to time but says the pain that was there before is gone. She is denying any difficulty breathing at this time. No acute fall or contusion. She was not being very active when this occurred. She denies any abdominal pain or acute urine or stool changes or extremity changes other than stating the right thigh seems to be hurting little bit. She wonders if the storm is coming in as that sometimes will cause this symptom. Patient's family then joins the patient at bedside. They feel that she seems not quite as sharp as usual.  She seems to be speaking well but occasionally she will stumble over a detail. She lives at home alone. Patient mentions that she just had a heart test pretty recently that came back looking good. PTA pt from home alone where pt was Ind with mobility and ADLs. Pt currently functioning below baseline completing mobility and transfers with SBA and use of RW. Anticipate pt needing up to SBA for ADLs. Pt with no LOB and no reports of chest pain. Pt will benefit from skilled OT services at this time. Anticipate pt safe to return home with PRN assist and progression in therapy. Prognosis: Good  Decision Making: Low Complexity  Exam: see above  Assistance / Modification: RW  OT Education: OT Role;Plan of Care;Transfer Training;ADL Adaptive Strategies  REQUIRES OT FOLLOW UP: Yes  Activity Tolerance  Activity Tolerance: Patient Tolerated treatment well  Safety Devices  Safety Devices in place: Yes  Type of devices: Call light within reach; Chair alarm in place;Nurse notified; Left in chair           Patient Diagnosis(es): The encounter diagnosis was Chest pain, unspecified type. has a past medical history of Arthritis, Blood circulation, collateral, Cataract, Clostridium difficile infection, GERD (gastroesophageal reflux disease), Glaucoma, Hyperlipidemia, Hypertension, Macular degeneration, and UTI (urinary tract infection). has a past surgical history that includes Cataract removal (Bilateral, 2008); Total knee arthroplasty; Hysterectomy (1980); Varicose vein surgery (1967 leticia, 1984 left.); Colonoscopy; hernia repair (Left, 06/08/2018); and hernia repair (Right, 6/4/2020). Restrictions       Subjective   General  Chart Reviewed: Yes  Patient assessed for rehabilitation services?: Yes  Additional Pertinent Hx: per ED note, Roopa Castellanos is a 80 y.o. female who presents stating that about an hour before coming in she started having some pain across the center of her chest.  Eventually an animal is called and they came to her house and brought her in.   On the way they gave her some aspirin and nitro and she states now the pain that was there when she was at home is not there. She still feels a twinge of something in the center chest from time to time but says the pain that was there before is gone. She is denying any difficulty breathing at this time. No acute fall or contusion. She was not being very active when this occurred. She denies any abdominal pain or acute urine or stool changes or extremity changes other than stating the right thigh seems to be hurting little bit. She wonders if the storm is coming in as that sometimes will cause this symptom. Patient's family then joins the patient at bedside. They feel that she seems not quite as sharp as usual.  She seems to be speaking well but occasionally she will stumble over a detail. She lives at home alone. Patient mentions that she just had a heart test pretty recently that came back looking good. Family / Caregiver Present: No  Referring Practitioner: Tevin Beltran MD  Subjective  Subjective: Pt agreeable to OT evaluation. Pt reports some pain in R LE with no number stated. General Comment  Comments: okay for therapy per RN.   Vital Signs  Temp: 98 °F (36.7 °C)  Temp Source: Oral  Pulse: 55  Heart Rate Source: Monitor  Resp: 18  BP: (!) 143/80  BP Location: Right Arm  MAP (mmHg): 101  Oxygen Therapy  SpO2: 96 %  O2 Device: None (Room air)  Social/Functional History  Social/Functional History  Lives With: Alone  Type of Home: House  Home Layout: Two level, Bed/Bath upstairs, Laundry in basement, 1/2 bath on main level  Home Access: Stairs to enter with rails  Entrance Stairs - Number of Steps: 1+1  Entrance Stairs - Rails: Right  Bathroom Shower/Tub: Walk-in shower  Bathroom Toilet: Standard  Bathroom Equipment: Grab bars in shower, Shower chair, Grab bars around toilet  Home Equipment: Rolling walker, Cane  ADL Assistance: Independent  Ambulation Assistance: Independent (with RW)  Transfer Assistance: Independent  Active : No       Objective   Vision: Impaired  Vision Exceptions: Wears glasses at all times  Hearing: Exceptions to [facility]  Hearing Exceptions: Hard of hearing/hearing concerns    Orientation  Overall Orientation Status: Within Functional Limits     Balance  Sitting Balance: Supervision  Standing Balance: Stand by assistance (RW)  Functional Mobility  Functional - Mobility Device: Rolling Walker  Activity: To/from bathroom; Other (~15ft, 75ft)  Assist Level: Stand by assistance  Functional Mobility Comments: no LOB; no reports of chest pain  Toilet Transfers  Toilet - Technique: Ambulating (RW)  Equipment Used: Grab bars  Toilet Transfer: Stand by assistance  Wheelchair Bed Transfers  Wheelchair/Bed - Technique: Ambulating (RW)  Equipment Used: Bed;Other (bed to chair)  Level of Asssistance: Stand by assistance  ADL  Grooming: Stand by assistance (in stance at sink to wash hands)  LE Dressing: Supervision (seated EOB to don bilateral socks)  Toileting: Stand by assistance (SBA for balance; pt able to complete pericare and brief management)  Additional Comments: Anticipate pt needing up to SBA for ADLs including dressing, bathing, and toileting based on ROM, strength, and balance  Tone RUE  RUE Tone: Normotonic  Tone LUE  LUE Tone: Normotonic  Coordination  Movements Are Fluid And Coordinated: Yes     Bed mobility  Supine to Sit: Stand by assistance  Transfers  Sit to stand: Stand by assistance  Stand to sit: Stand by assistance  Transfer Comments: to/from [employer]  Overall Cognitive Status: Exceptions  Arousal/Alertness: Appropriate responses to stimuli  Following Commands:  Follows all commands without difficulty  Memory: Decreased recall of recent events                 LUE AROM (degrees)  LUE AROM : WFL  RUE AROM (degrees)  RUE AROM : WFL  LUE Strength  Gross LUE Strength: WFL  RUE Strength  Gross RUE Strength: WFL                   Plan   Plan  Times per week: 3-5x  Current Treatment Recommendations: Strengthening, Functional Mobility Training, Endurance Training, Balance Training, Self-Care / ADL, Safety Education & Training, Patient/Caregiver Education & Training      AM-PAC Score        AM-PAC Inpatient Daily Activity Raw Score: 21 (07/01/21 0739)  AM-PAC Inpatient ADL T-Scale Score : 44.27 (07/01/21 0739)  ADL Inpatient CMS 0-100% Score: 32.79 (07/01/21 0739)  ADL Inpatient CMS G-Code Modifier : Sommer Leonard (07/01/21 0739)    Goals  Short term goals  Time Frame for Short term goals: prior to D/C  Short term goal 1: complete functional mobility and transfers Mod Ind  Short term goal 2: complete bathing and dressing Mod Ind  Short term goal 3: complete toileting Mod Ind  Short term goal 4: complete grooming in stance at sink [address] term goals  Time Frame for Long term goals : STG=LTG  Patient Goals   Patient goals : no stated goals       Therapy Time   Individual Concurrent Group Co-treatment   Time In 0710         Time Out 0736         Minutes 26         Timed Code Treatment Minutes: 11 Minutes (15 minute eval)       Kit Reyes OTR/L

## 2021-07-01 NOTE — CARE COORDINATION
Received dc order for today. Spoke with patient and then daughter Rupinder Watkins. Both do not want ecf or assisted living at this time. Pt states she wants to go home with home care. Provided home care list and pt has selected [facility] or [facility] if [facility] home [facility] is not available. Daughter states she can transport pt home at 300 pm.  Pt and daughter deny any other dc needs. Referred pt to [facility].     Electronically signed by SLOAN Arndt on 7/1/2021 at 10:39 AM

## 2021-07-10 NOTE — DISCHARGE SUMMARY
follow  with Dr. Shivani Pedro. At the time of her discharge, her maintenance  medications were lorazepam 0.5 mg b.i.d., pantoprazole 40 mg daily,  Lasix 20 mg daily, metoprolol XL 25 daily, KCl 10 mEq daily, amlodipine  2.5 mg daily, aspirin 81 mg daily. Told to follow with Dr. Shivani Pedro in  one to two weeks.     Magy Campa MD    D: 07/09/2021 15:24:19       T: 07/09/2021 16:55:16     [email or web address]  Job#: [id number]     Doc#: [id number]    CC:  Kevyn Vilchis MD

## 2021-09-09 ENCOUNTER — OFFICE VISIT (OUTPATIENT)
Dept: GYNECOLOGY | Age: 86
End: 2021-09-09
Payer: MEDICARE

## 2021-09-09 VITALS
HEIGHT: 61 IN | RESPIRATION RATE: 17 BRPM | WEIGHT: 142 LBS | OXYGEN SATURATION: 97 % | SYSTOLIC BLOOD PRESSURE: 133 MMHG | DIASTOLIC BLOOD PRESSURE: 80 MMHG | HEART RATE: 71 BPM | BODY MASS INDEX: 26.81 KG/M2

## 2021-09-09 DIAGNOSIS — Z01.419 WELL WOMAN EXAM WITH ROUTINE GYNECOLOGICAL EXAM: Primary | ICD-10-CM

## 2021-09-09 DIAGNOSIS — R35.0 URINARY FREQUENCY: ICD-10-CM

## 2021-09-09 PROCEDURE — 99387 INIT PM E/M NEW PAT 65+ YRS: CPT | Performed by: OBSTETRICS & GYNECOLOGY

## 2021-09-09 RX ORDER — ESTRADIOL 0.1 MG/G
1 CREAM VAGINAL
Qty: 1 EACH | Refills: 3 | Status: SHIPPED
Start: 2021-09-09 | End: 2021-11-09 | Stop reason: CLARIF

## 2021-09-09 RX ORDER — LORAZEPAM 0.5 MG/1
0.5 TABLET ORAL 2 TIMES DAILY
COMMUNITY
Start: 2021-09-01 | End: 2021-10-22

## 2021-09-11 LAB — URINE CULTURE, ROUTINE: NORMAL

## 2021-09-12 ASSESSMENT — ENCOUNTER SYMPTOMS
GASTROINTESTINAL NEGATIVE: 1
RESPIRATORY NEGATIVE: 1
EYES NEGATIVE: 1

## 2021-09-13 NOTE — PROGRESS NOTES
Subjective:      Patient ID: Welby Scheuermann is a 80 y.o. female. Patient is here for gyn exam. Was given estrogen vaginal cream but wanted to make sure everything ok. Patient with urinary urgency. Gynecologic Exam  Associated symptoms include arthralgias. Review of Systems   Constitutional: Negative. HENT: Negative. Eyes: Negative. Respiratory: Negative. Cardiovascular: Negative. Gastrointestinal: Negative. Genitourinary: Positive for urgency. Musculoskeletal: Positive for arthralgias. Skin: Negative. Neurological: Negative. Psychiatric/Behavioral: Negative. Date of Birth 3/11/1929  Past Medical History:   Diagnosis Date    Arthritis     Blood circulation, collateral     Cataract     Clostridium difficile infection 14    GERD (gastroesophageal reflux disease)     Glaucoma     Hyperlipidemia     no meds    Hypertension     essential=pt denies    Macular degeneration     UTI (urinary tract infection)      Past Surgical History:   Procedure Laterality Date    CATARACT REMOVAL Bilateral     COLONOSCOPY      2009    HERNIA REPAIR Left 2018    repair of ventral hernia with mesh    HERNIA REPAIR Right 2020    OPEN RIGHT INGUINAL HERNIA REPAIR WITH MESH performed by Saeed Mcginnis MD at [address] [place] [address]    thinks ovaries out, too   [address]      rt 2009   dr Chrystal Layne   [facility], 1984 left. x3     OB History    Para Term  AB Living   2 2 2     2   SAB TAB Ectopic Molar Multiple Live Births                    # Outcome Date GA Lbr [place]/2nd Weight Sex Delivery Anes PTL Lv   2 Term     F Vag-Spont      1 Term     M Vag-Spont         Obstetric Comments   Patient had a miscarriage in between her son and daughter     Social History     Socioeconomic History    Marital status:       Spouse name: Not on file    Number of children: 2    Years of education: Not on file    Highest education level: Not on file   Occupational History    Not on file   Tobacco Use    Smoking status: Never Smoker    Smokeless tobacco: Never Used   Vaping Use    Vaping Use: Never used   Substance and Sexual Activity    Alcohol use: No    Drug use: Never    Sexual activity: Not Currently   Other Topics Concern    Not on file   Social History Narrative    Not on file     Social Determinants of Health     Financial Resource Strain: Unknown    Difficulty of Paying Living Expenses: Patient refused   Food Insecurity: Unknown    Worried About Running Out of Food in the Last Year: Patient refused    [address] in the Last Year: Patient refused   Transportation Needs:     Lack of Transportation (Medical):  Lack of Transportation (Non-Medical):    Physical Activity:     Days of Exercise per Week:     Minutes of Exercise per Session:    Stress:     Feeling of Stress :    Social Connections:     Frequency of Communication with Friends and Family:     Frequency of Social Gatherings with Friends and Family:     Attends [employer] Services:     Active Member of Clubs or Organizations:     Attends Club or Organization Meetings:     Marital Status:    Intimate Partner Violence:     Fear of Current or Ex-Partner:     Emotionally Abused:     Physically Abused:     Sexually Abused:       Allergies   Allergen Reactions    Ciprofloxacin Other (See Comments)     Legs tingling    Oxycodone-Acetaminophen Nausea Only     Sick to stomach    Percocet [Oxycodone-Acetaminophen] Nausea And Vomiting     Outpatient Medications Marked as Taking for the 9/9/21 encounter (Office Visit) with Kimmy Cruz MD   Medication Sig Dispense Refill    estradiol (ESTRACE VAGINAL) 0.1 MG/GM vaginal cream Place 1 g vaginally Twice a Week 1 each 3    pantoprazole (PROTONIX) 40 MG tablet TAKE ONE TABLET BY MOUTH EVERY MORNING 90 tablet 0    metoprolol succinate (TOPROL XL) 25 MG extended release tablet TAKE ONE TABLET BY MOUTH DAILY 90 tablet 0    furosemide (LASIX) 20 MG tablet TAKE ONE TABLET BY MOUTH EVERY MORNING 90 tablet 0    potassium chloride (KLOR-CON M) 10 MEQ extended release tablet TAKE ONE TABLET BY MOUTH DAILY 90 tablet 2    amLODIPine (NORVASC) 2.5 MG tablet Take 1 tablet by mouth daily 90 tablet 3    calcium carbonate (OSCAL) 500 MG TABS tablet Take 1,000 mg by mouth daily      Cranberry 450 MG CAPS Take 450 mg by mouth daily       aspirin 81 MG tablet Take 81 mg by mouth daily      Multiple Vitamins-Minerals (PRESERVISION AREDS 2) CAPS Take 2 capsules by mouth daily      Probiotic Product (ALIGN PO) Take 1 tablet by mouth daily       latanoprost (XALATAN) 0.005 % SOLN Place 1 drop into both eyes nightly        Family History   Problem Relation Age of Onset    Cancer Father     Cancer Sister         pancreatic     /80 (Site: Right Upper Arm, Position: Sitting, Cuff Size: Large Adult)   Pulse 71   Resp 17   Ht 5' 1\" (1.549 m)   Wt 142 lb (64.4 kg)   SpO2 97%   Breastfeeding No   BMI 26.83 kg/m²       Objective:   Physical Exam  Constitutional:       General: She is not in acute distress. Appearance: Normal appearance. She is well-developed and normal weight. She is not diaphoretic. HENT:      Head: Normocephalic and atraumatic. Nose: Nose normal.      Mouth/Throat:      Mouth: Mucous membranes are moist.      Pharynx: Oropharynx is clear. Eyes:      Pupils: Pupils are equal, round, and reactive to light. Neck:      Thyroid: No thyromegaly. Cardiovascular:      Rate and Rhythm: Normal rate and regular rhythm. Heart sounds: Normal heart sounds. No murmur heard. No friction rub. No gallop. Pulmonary:      Effort: Pulmonary effort is normal. No respiratory distress. Breath sounds: Normal breath sounds. No wheezing or rales.    Chest:      Breasts:         Right: Normal. No swelling, bleeding, inverted nipple, mass, nipple discharge, skin change or tenderness. Left: Normal. No swelling, bleeding, inverted nipple, mass, nipple discharge, skin change or tenderness. Abdominal:      General: Abdomen is flat. Bowel sounds are normal. There is no distension. Palpations: Abdomen is soft. There is no hepatomegaly or mass. Tenderness: There is no abdominal tenderness. There is no guarding or rebound. Hernia: No hernia is present. There is no hernia in the left inguinal area. Genitourinary:     General: Normal vulva. Exam position: Lithotomy position. Pubic Area: No rash. Labia:         Right: No rash, tenderness, lesion or injury. Left: No rash, tenderness, lesion or injury. Urethra: No prolapse, urethral pain, urethral swelling or urethral lesion. Vagina: Normal. No signs of injury and foreign body. No vaginal discharge, erythema, tenderness or bleeding. Cervix: No cervical motion tenderness, discharge, friability, lesion, erythema, cervical bleeding or eversion. Uterus: Not deviated, not enlarged, not fixed, not tender and no uterine prolapse. Adnexa:         Right: No mass, tenderness or fullness. Left: No mass, tenderness or fullness. Rectum: Normal. Guaiac result negative. No mass, tenderness, anal fissure, external hemorrhoid or internal hemorrhoid. Normal anal tone. Comments: Normal urethral meatus, nl urethra, nl bladder. Musculoskeletal:         General: No tenderness. Normal range of motion. Cervical back: Normal range of motion and neck supple. No rigidity. Lymphadenopathy:      Cervical: No cervical adenopathy. Lower Body: No right inguinal adenopathy. No left inguinal adenopathy. Skin:     General: Skin is warm and dry. Findings: No erythema or rash. Neurological:      General: No focal deficit present. Mental Status: She is alert and oriented to person, place, and time.       Deep Tendon Reflexes: Reflexes are normal and symmetric. Psychiatric:         Mood and Affect: Mood normal.         Behavior: Behavior normal.         Thought Content: Thought content normal.         Judgment: Judgment normal.         Assessment:      1. Annual  2. Menopause  3. Urinary frequency      Plan:      1. Pap, caclium, exercise, mammogram, hemocult negative  2. Stable except below  3. Feel should continue with vaginal estrogen twice weekly.         Christopher Diamond MD

## 2021-11-15 ENCOUNTER — CLINICAL DOCUMENTATION (OUTPATIENT)
Dept: OTHER | Age: 86
End: 2021-11-15

## 2022-07-10 NOTE — PROGRESS NOTES
Advance as tolerated   Libby DARNELL [facility] Progress Note  1/7/2020 7:54 AM  Subjective:   Admit Date: 1/6/2020      Chief Complaint: No recc c/p     Interval History: trop neg x 3   Resting in bed  Await echo and will incr activity and see if c/p recurs     Diet: DIET CARDIAC; No Caffeine  Medications:   Scheduled Meds:   aspirin  81 mg Oral Daily    furosemide  20 mg Oral Daily    latanoprost  1 drop Both Eyes Nightly    I-EVARISTO  2 tablet Oral Daily    pantoprazole  40 mg Oral QAM AC    potassium chloride  10 mEq Oral Daily    sodium chloride flush  10 mL Intravenous 2 times per day    enoxaparin  30 mg Subcutaneous Nightly    metoprolol succinate  25 mg Oral Daily     Continuous Infusions:    Review of Systems -   General ROS: afebrile  Respiratory ROS: no cough, shortness of breath or wheezing  Cardiovascular ROS: no chest pain  Musculoskeletal ROS:positive for - :joint pain  Neurological ROS: no TIA or stroke symptoms    Objective:   Vitals: BP (!) 161/78   Pulse 68   Temp 98 °F (36.7 °C) (Oral)   Resp 20   Ht 5' (1.524 m)   Wt 140 lb 3.4 oz (63.6 kg)   SpO2 94%   BMI 27.38 kg/m²   General appearance: alert and cooperative with exam  HEENT: Head: Normocephalic, no lesions, without obvious abnormality.   Neck: no adenopathy, no carotid bruit, no JVD, supple, symmetrical, trachea midline and thyroid not enlarged, symmetric, no tenderness/mass/nodules  Lungs: clear to auscultation bilaterally  Heart: regular rate and rhythm, S1, S2 normal, no murmur, click, rub or gallop  Abdomen: soft, non-tender; bowel sounds normal; no masses,  no organomegaly  Extremities: extremities normal, atraumatic, no cyanosis or edema  Neurologic: Mental status: Alert, oriented, thought content appropriate          Assessment & Plan:   Principal Problem:    Chest pain--non cardiac--await echo --incr activity   Active Problems:    Dizziness--posit--min ---chr sx     Essential hypertension--improved--torprol-xl added     Abnormal glucose--stable   Resolved Problems:    Hemorrhoid  Will incr activity---ck echo --possibly home later today--l/m fpr daughter this AM  \  Echo--wnl--will dc to home today-with [facility]--see me in 1-2 weeks ---DS dictated   Please note that over 35 minutes was spent in evaluating the patient, review of records and results, discussion with staff/family, etc.    Jessica Salazar MD

## 2023-05-31 NOTE — DISCHARGE INSTR - DIET
5/31/2023    Pamella Abraham MD  [address]  [facility] [address]    RE: Claudia Colunga       Dear Colleague,     I had the pleasure of seeing Claudia Colunga in the [facility].    HEART CARE ENCOUNTER CONSULTATON NOTE      [facility]  [phone]      Assessment/Recommendations   Assessment:  1.  Atrial flutter/fibrillation: Atrial flutter status post ablation 8/25/2020 now with recurrent atrial fibrillation and flutter.  Scheduled for PVI and repeat aflutter ablation.  She is hesitant to start antiarrhythmic therapy.  2.  Hypertension: Well-controlled  3.  Diabetes mellitus type 2  4.  Dyslipidemia: Would like to more aggressively manage lipids with her diabetes    Plan:  1.  Continue Eliquis 5 mg twice daily  2.  Continue Toprol XL  3.  Patient plans on proceeding with PVI with aflutter mapping and ablation  4.  May start low-dose Lipitor 10 mg daily for improved cholesterol management     May follow-up in 1 year       History of Present Illness/Subjective    HPI: Claudia Colunga is a 68 year old femalevwith history of hypertension, diabetes mellitus type 2, typical atrial flutter status post ablation done on 8/25/2020 with atrial fibrillation status post cardioversion during procedure who is here for a follow-up.  I saw her a year ago she has had presentation to the ED in April with atrial flutter requiring cardioversion and subsequently a couple weeks later again went to the ED with atrial fibrillation and spontaneously converted.  She was seen in EP clinic by Dr. Bower and is being scheduled for PVI/repeat a flutter ablation.  She has tried different lifestyle modifications.  She does not drink alcohol, cut out caffeine.  She is trying to follow healthy diet and exercise regularly.  She feels that her episodes are stress related.       Physical Examination  Review of Systems   Vitals: /74 (BP Location: Left arm, Patient Position: Sitting, Cuff Size: Adult   Good nutrition is important when healing from an illness, injury, or surgery. Follow any nutrition recommendations given to you during your hospital stay.  If you were given an oral nutrition supplement while in the hospital, continue to take this supplement at home. You can take it with meals, in-between meals, and/or before bedtime. These supplements can be purchased at most local grocery stores, pharmacies, and chain super-stores.  If you have any questions about your diet or nutrition, call the hospital and ask for the dietitian. "Large)   Pulse 73   Resp 16   Ht 1.803 m (5' 11\")   Wt 93.3 kg (205 lb 11.2 oz)   SpO2 96%   BMI 28.69 kg/m    BMI= Body mass index is 28.69 kg/m .  Wt Readings from Last 3 Encounters:   05/31/23 93.3 kg (205 lb 11.2 oz)   05/15/23 97.1 kg (214 lb)   05/11/23 93.9 kg (207 lb)       General Appearance:   no distress, normal body habitus   ENT/Mouth: membranes moist, no oral lesions or bleeding gums.      EYES:  no scleral icterus, normal conjunctivae   Neck: no carotid bruits or thyromegaly   Chest/Lungs:   lungs are clear to auscultation   Cardiovascular:   Regular. Normal first and second heart sounds with no murmur no edema bilaterally        Extremities: no cyanosis or clubbing   Skin: no xanthelasma, warm.    Neurologic: normal  bilateral, no tremors     Psychiatric: alert and oriented x3, calm        Please refer above for cardiac ROS details.        Medical History  Surgical History Family History Social History   Past Medical History:   Diagnosis Date    Anxiety     Atrial flutter (H)     Diabetes mellitus, type II (H)     Eosinophilic esophagitis     Essential hypertension     Hypothyroid     Overflow incontinence     Typical atrial flutter (H) 6/23/2020 8/25/2020 right CTI flutter ablation and developed atrial fib post  ablation      Past Surgical History:   Procedure Laterality Date    BIOPSY OF BREAST, INCISIONAL      BIOPSY OF BREAST, INCISIONAL      EP ABLATION AFLUTTER Right 8/25/2020    Procedure: EP Ablation Atrial Flutter;  Surgeon: Vick Adams MD;  Location: [facility];  Service: Cardiology    OVARIAN CYST SURGERY       Family History   Problem Relation Age of Onset    Dementia Mother 91    Hip fracture Mother 93        went to nursing home after this    Cerebrovascular Disease Mother 96        had to be fed by hand prior to her death    Colon Cancer Father 70    Aortic aneurysm Father         abdominal, repaired    Cerebral aneurysm Father     Peripheral Vascular " Disease Father 96        ruptured femoral aneurysm?    Diabetes Type 2  Sister     Breast Cancer Maternal Cousin     Heart Disease No family hx of         Social History     Socioeconomic History    Marital status: Single     Spouse name: Not on file    Number of children: 0    Years of education: Not on file    Highest education level: Not on file   Occupational History    Not on file   Tobacco Use    Smoking status: Never    Smokeless tobacco: Never   Vaping Use    Vaping status: Not on file   Substance and Sexual Activity    Alcohol use: Yes     Comment: Alcoholic Drinks/day: rare, only with dinner out with friends    Drug use: No    Sexual activity: Not on file   Other Topics Concern    Not on file   Social History Narrative    She walks her poodle daily for 30-60 minutes. She enjoys going to the [employer] or [employer] to paddle in the pool.     Social Determinants of Health     Financial Resource Strain: Not on file   Food Insecurity: Not on file   Transportation Needs: Not on file   Physical Activity: Not on file   Stress: Not on file   Social Connections: Not on file   Intimate Partner Violence: Not on file   Housing Stability: Not on file           Medications  Allergies   Current Outpatient Medications   Medication Sig Dispense Refill    apixaban ANTICOAGULANT (ELIQUIS ANTICOAGULANT) 5 MG tablet Take 1 tablet (5 mg) by mouth 2 times daily 180 tablet 1    atorvastatin (LIPITOR) 10 MG tablet Take 1 tablet (10 mg) by mouth daily 90 tablet 3    levothyroxine (SYNTHROID/LEVOTHROID) 75 MCG tablet TAKE 1 TABLET BY MOUTH ONCE DAILY AT 6AM 90 tablet 3    metoprolol succinate ER (TOPROL XL) 50 MG 24 hr tablet TAKE 1 TABLET BY MOUTH TWICE A  tablet 2    multivitamin capsule [MULTIVITAMIN CAPSULE] Take 1 capsule by mouth daily.      Cholecalciferol (VITAMIN D3 PO) Take 1 capsule by mouth daily (Patient not taking: Reported on 5/15/2023)         Allergies   Allergen Reactions    Lisinopril Rash     Penicillins Rash          Lab Results    Chemistry/lipid CBC Cardiac Enzymes/BNP/TSH/INR   Recent Labs   Lab Test 12/07/22  1131   CHOL 180   HDL 43*   *   TRIG 142     Recent Labs   Lab Test 12/07/22  1131 07/07/21  0954 10/09/19  0727   * 98 97     Recent Labs   Lab Test 05/04/23  2255      POTASSIUM 4.5   CHLORIDE 105   CO2 26   *   BUN 15.3   CR 0.81   GFRESTIMATED 79   AMARJIT 9.2     Recent Labs   Lab Test 05/04/23  2255 04/17/23  0133 12/07/22  1131   CR 0.81 0.72 0.70     Recent Labs   Lab Test 05/15/23  1441 12/07/22  1131 03/23/22  1042   A1C 6.6* 6.7* 6.2*          Recent Labs   Lab Test 05/04/23  2255   WBC 10.1   HGB 13.9   HCT 42.4   MCV 95        Recent Labs   Lab Test 05/04/23  2255 04/17/23  0133 12/07/22  1131   HGB 13.9 14.6 14.2    Recent Labs   Lab Test 05/06/21  0251 06/23/20  0222   TROPONINI <0.01 <0.01     No results for input(s): BNP, NTBNPI, NTBNP in the last 14467 hours.  Recent Labs   Lab Test 05/15/23  1441   TSH 1.44     No results for input(s): INR in the last 15733 hours.     Barb Paulino MD      Thank you for allowing me to participate in the care of your patient.      Sincerely,     Barb Paulino MD     [facility]  cc:   No referring provider defined for this encounter.

## 2023-09-27 ENCOUNTER — APPOINTMENT (OUTPATIENT)
Dept: GENERAL RADIOLOGY | Age: 88
End: 2023-09-27
Payer: MEDICARE

## 2023-09-27 ENCOUNTER — HOSPITAL ENCOUNTER (EMERGENCY)
Age: 88
Discharge: HOME | End: 2023-09-27
Attending: EMERGENCY MEDICINE
Payer: MEDICARE

## 2023-09-27 VITALS
TEMPERATURE: 97.6 F | HEIGHT: 61 IN | BODY MASS INDEX: 26.6 KG/M2 | RESPIRATION RATE: 14 BRPM | HEART RATE: 52 BPM | OXYGEN SATURATION: 96 % | SYSTOLIC BLOOD PRESSURE: 120 MMHG | DIASTOLIC BLOOD PRESSURE: 51 MMHG

## 2023-09-27 DIAGNOSIS — S42.201A CLOSED FRACTURE OF PROXIMAL END OF RIGHT HUMERUS, UNSPECIFIED FRACTURE MORPHOLOGY, INITIAL ENCOUNTER: Primary | ICD-10-CM

## 2023-09-27 DIAGNOSIS — S42.211A CLOSED DISPLACED FRACTURE OF SURGICAL NECK OF RIGHT HUMERUS, UNSPECIFIED FRACTURE MORPHOLOGY, INITIAL ENCOUNTER: Primary | ICD-10-CM

## 2023-09-27 PROBLEM — M25.511 ACUTE PAIN OF RIGHT SHOULDER: Status: ACTIVE | Noted: 2023-09-27

## 2023-09-27 PROCEDURE — 73030 X-RAY EXAM OF SHOULDER: CPT

## 2023-09-27 PROCEDURE — 99283 EMERGENCY DEPT VISIT LOW MDM: CPT

## 2023-09-27 PROCEDURE — 73060 X-RAY EXAM OF HUMERUS: CPT

## 2023-09-27 PROCEDURE — 6370000000 HC RX 637 (ALT 250 FOR IP): Performed by: EMERGENCY MEDICINE

## 2023-09-27 RX ORDER — ACETAMINOPHEN 500 MG
1000 TABLET ORAL ONCE
Status: COMPLETED | OUTPATIENT
Start: 2023-09-27 | End: 2023-09-27

## 2023-09-27 RX ADMIN — ACETAMINOPHEN 1000 MG: 500 TABLET ORAL at 13:56

## 2023-09-27 ASSESSMENT — PAIN DESCRIPTION - DESCRIPTORS: DESCRIPTORS: DISCOMFORT

## 2023-09-27 ASSESSMENT — PAIN DESCRIPTION - ONSET: ONSET: SUDDEN

## 2023-09-27 ASSESSMENT — ENCOUNTER SYMPTOMS
GASTROINTESTINAL NEGATIVE: 1
ABDOMINAL PAIN: 0
SHORTNESS OF BREATH: 0

## 2023-09-27 ASSESSMENT — PAIN - FUNCTIONAL ASSESSMENT: PAIN_FUNCTIONAL_ASSESSMENT: ACTIVITIES ARE NOT PREVENTED

## 2023-09-27 ASSESSMENT — PAIN DESCRIPTION - ORIENTATION: ORIENTATION: RIGHT

## 2023-09-27 ASSESSMENT — PAIN DESCRIPTION - PAIN TYPE: TYPE: ACUTE PAIN

## 2023-09-27 ASSESSMENT — PAIN DESCRIPTION - LOCATION: LOCATION: SHOULDER

## 2023-09-27 NOTE — CONSULTS
[facility] Orthopedic Surgery  Consult Note         This patient is seen in consultation at the request of Dr Jatin Parra MD    Reason for Consult:  Right shoulder pain/ minimally displaced proximal humerus fracture. CHIEF COMPLAINT:  Right shoulder pain/ fall. History Obtained From:  patient, family member - son, electronic medical record    HISTORY OF PRESENT ILLNESS:    Ms. Marybeth Ramirez is a 80 y.o.  female right handed who seen today at [facility] ED for consultation and evaluation of a right shoulder injury. The patient reports that this injury occurred when she fell and hit a door at Dr Kimmy Mayes MD' Office while there for routine 6 months check . She was first seen and evaluated in [facility] ED, when she was x-rayed and I was consulted. The patient denies any other injuries. Rates pain a 8/10 VAS moderate, sharp, constant and show no change. Alleviating factors rest. Movement makes the pain worse, the sling and resting makes the pain better. No numbness or tingling sensation. She lives at home on her own. Past Medical History:        Diagnosis Date    Arthritis     Blood circulation, collateral     Cataract     Clostridium difficile infection 1/28/14    GERD (gastroesophageal reflux disease)     Glaucoma     Hyperlipidemia     no meds    Hypertension 2017    essential=pt denies    Macular degeneration     UTI (urinary tract infection)        Past Surgical History:        Procedure Laterality Date    CATARACT REMOVAL Bilateral 2008    COLONOSCOPY      12/17/2009    HERNIA REPAIR Left 06/08/2018    repair of ventral hernia with mesh    HERNIA REPAIR Right 06/04/2020    OPEN RIGHT INGUINAL HERNIA REPAIR WITH MESH performed by Chu Wei MD at [address] ([facility])  1980    thinks ovaries out, too    PARTIAL HYSTERECTOMY (CERVIX NOT REMOVED)      TOTAL KNEE ARTHROPLASTY      rt 6/9/2009   dr Buddy Calixto    [address] [facility] [place], [place] left.     x3

## 2023-09-27 NOTE — ED TRIAGE NOTES
Patient to the ER with complaints of a fall that happened at Dr. Marisol Ashford office about 20 minutes ago. Patient says she was there for a visit and her shoes got stuck on the ground and she fell. Patient complains of right shoulder pain. She says the nurse caught her head so she didn't hit it and didn't have any LOC.

## 2023-10-03 PROBLEM — R41.3 MEMORY LOSS: Status: ACTIVE | Noted: 2023-10-03

## 2023-10-10 ENCOUNTER — TELEPHONE (OUTPATIENT)
Dept: ORTHOPEDIC SURGERY | Age: 88
End: 2023-10-10

## 2023-10-10 NOTE — TELEPHONE ENCOUNTER
General Question     Subject: APPT QUESTIONS  Patient and /or Facility Request: Yoel Guillaume  Contact Number: [phone]    PT SON WILIAM [address] TO BE SEEN IN 6 WKS NOT 2 WKS   CHANGED APPT TO [address] [place] [address],Third Floor STAY IF NEEDS TO BE SOONER PLEASE CLL PT AT N NUMBER LISTED ABOVE

## 2023-10-20 ENCOUNTER — TELEPHONE (OUTPATIENT)
Dept: ORTHOPEDIC SURGERY | Age: 88
End: 2023-10-20

## 2023-10-20 NOTE — TELEPHONE ENCOUNTER
Medical Facility Question     Facility Name: [address] [address] [facility]  Contact Name: Tomás Shoulder  Contact Number: [phone]  Request or Information: NEEDS TO KNOW IF PATIENT CAN START ROM AND PENDULUM  EXERCISES. PLS CLL TO ADVISE.  OK TO LVM SECURE

## 2023-10-23 NOTE — TELEPHONE ENCOUNTER
KRUPA BASSETT/ Glenys Hernandez. Non-operatively in a sling right shoulder, with no heavy impact activities, and gentle ROM.

## 2023-11-08 ENCOUNTER — OFFICE VISIT (OUTPATIENT)
Dept: ORTHOPEDIC SURGERY | Age: 88
End: 2023-11-08

## 2023-11-08 VITALS — WEIGHT: 141.09 LBS | BODY MASS INDEX: 26.64 KG/M2 | HEIGHT: 61 IN

## 2023-11-08 DIAGNOSIS — S42.291A OTHER CLOSED DISPLACED FRACTURE OF PROXIMAL END OF RIGHT HUMERUS, INITIAL ENCOUNTER: ICD-10-CM

## 2023-11-08 DIAGNOSIS — M25.511 ACUTE PAIN OF RIGHT SHOULDER: Primary | ICD-10-CM

## 2023-11-08 PROCEDURE — 99024 POSTOP FOLLOW-UP VISIT: CPT | Performed by: NURSE PRACTITIONER

## 2023-11-08 PROCEDURE — APPNB15 APP NON BILLABLE TIME 0-15 MINS: Performed by: NURSE PRACTITIONER

## 2023-11-08 NOTE — PROGRESS NOTES
CHIEF COMPLAINT: Right shoulder pain/ minimally displaced proximal humerus fracture. DATE OF INJURY: 9/27/2023, DOT: 9/27/2023    HISTORY:  Ms. Myriam Lindsay is a 80 y.o.  female right handed who presents today for evaluation of a right shoulder injury. The patient reports that this injury occurred when she fell. She was first seen and evaluated in [facility], when she was x-rayed and splinted, and Dr. Maxine Collins saw her while inpatient. The patient denies any other injuries. Rates pain a 5/10 VAS moderate, aching, intermittent and are improving. Alleviating factors rest. Movement makes the pain worse, the sling and resting makes the pain better. No numbness or tingling sensation. Past Medical History:   Diagnosis Date    Arthritis     Blood circulation, collateral     Cataract     Clostridium difficile infection 1/28/14    GERD (gastroesophageal reflux disease)     Glaucoma     Hyperlipidemia     no meds    Hypertension 2017    essential=pt denies    Macular degeneration     UTI (urinary tract infection)        Past Surgical History:   Procedure Laterality Date    CATARACT REMOVAL Bilateral 2008    COLONOSCOPY      12/17/2009    HERNIA REPAIR Left 06/08/2018    repair of ventral hernia with mesh    HERNIA REPAIR Right 06/04/2020    OPEN RIGHT INGUINAL HERNIA REPAIR WITH MESH performed by Micheal Ahmadi MD at [address] [place] [address] ([facility])  1980    thinks ovaries out, too    PARTIAL HYSTERECTOMY (CERVIX NOT REMOVED)      TOTAL KNEE ARTHROPLASTY      rt 6/9/2009   dr Galvan [facility]    [address] [facility] left. x3       Social History     Socioeconomic History    Marital status:       Spouse name: Not on file    Number of children: 2    Years of education: Not on file    Highest education level: Not on file   Occupational History    Not on file   Tobacco Use    Smoking status: Never    Smokeless tobacco: Never   Vaping Use    Vaping Use: Never used   Substance

## 2023-11-09 ENCOUNTER — TELEPHONE (OUTPATIENT)
Dept: ORTHOPEDIC SURGERY | Age: 88
End: 2023-11-09

## 2023-11-09 NOTE — TELEPHONE ENCOUNTER
Other [facility] IS CALLING IN REQUESTING UPDATED PLAN OF CARE. WANTS TO KNOW IF THE PATIENT IS WEIGHT BEARING.  [facility] IS CONFIDENTIAL OK TO LEAVE MESSAGE

## 2023-11-21 ENCOUNTER — OFFICE VISIT (OUTPATIENT)
Dept: ENT CLINIC | Age: 88
End: 2023-11-21

## 2023-11-21 VITALS
DIASTOLIC BLOOD PRESSURE: 72 MMHG | RESPIRATION RATE: 16 BRPM | OXYGEN SATURATION: 98 % | HEART RATE: 65 BPM | HEIGHT: 60 IN | WEIGHT: 137 LBS | BODY MASS INDEX: 26.9 KG/M2 | SYSTOLIC BLOOD PRESSURE: 152 MMHG

## 2023-11-21 DIAGNOSIS — H91.93 BILATERAL HEARING LOSS, UNSPECIFIED HEARING LOSS TYPE: Primary | ICD-10-CM

## 2023-11-21 DIAGNOSIS — H61.23 BILATERAL IMPACTED CERUMEN: ICD-10-CM

## 2023-11-21 NOTE — PROGRESS NOTES
strong voice  EYES: EOMI, Anti-icteric  HENT:   Head: Normocephalic and atraumatic. Face:  Symmetric, facial nerve intact  Bilateral impacted cerumen  Mouth/Oral Cavity:  normal lips, Uvula is midline, no mucosal lesions, no trismus, normal dentition, normal salivary quality/flow  Oropharynx/Larynx:  normal oropharynx, 1+ tonsils  Nose:Normal external nasal appearance. Normal mucosa   NECK: Normal range of motion, no thyromegaly, trachea is midline, no lymphadenopathy, no neck masses, no crepitus  CHEST: Normal respiratory effort, no retractions, breathing comfortably  SKIN: No rashes, normal appearing skin, no evidence of skin lesions/tumors  Neuro:  cranial nerve II-XII intact; normal gait  Cardio:  no edema        PROCEDURE    Use of Operating Microscope and Cerumen Removal CPT code 86759-04  Indications: Bilateral cerumen impaction obstructing visualization of the tympanic membrane(s). An operating microscope was utilized to visualize the external auditory canals using a speculum. The external auditory canals were occluded with cerumen bilaterally. The cerumen and debris was removed with instrumentation including suction and currettes under microscopic evaluation. The bilateral tympanic membrane(s) and ossicles are intact. No fluid was visualized in the bilateral middle ears. This note was generated completely or in part utilizing Dragon dictation speech recognition software. Occasionally, words are mistranscribed and despite editing, the text may contain inaccuracies due to incorrect word recognition. If further clarification is needed please contact the office at [phone]. An electronic signature was used to authenticate this note.     --Rosa Jimenez MD

## 2024-01-12 ENCOUNTER — OFFICE VISIT (OUTPATIENT)
Dept: ORTHOPEDIC SURGERY | Age: 89
End: 2024-01-12

## 2024-01-12 VITALS — BODY MASS INDEX: 27.09 KG/M2 | HEIGHT: 60 IN | WEIGHT: 138 LBS

## 2024-01-12 DIAGNOSIS — S42.291A OTHER CLOSED DISPLACED FRACTURE OF PROXIMAL END OF RIGHT HUMERUS, INITIAL ENCOUNTER: Primary | ICD-10-CM

## 2024-01-12 NOTE — PROGRESS NOTES
CHIEF COMPLAINT: Right shoulder pain/ minimally displaced proximal humerus fracture.    DATE OF INJURY: 9/27/2023, DOT: 9/27/2023    HISTORY: Galina Robison 89 y.o.  female right handed who presents today for f/u evaluation of a right shoulder injury.  The patient reports that this injury occurred when she fell.  She was first seen and evaluated in [facility] ER, when she was x-rayed and splinted, and I saw her while inpatient.  The patient denies any other injuries. Rates pain a 5/10 VAS moderate, aching, intermittent and are improving.  Alleviating factors rest. Movement makes the pain worse, the sling and resting makes the pain better.  No numbness or tingling sensation.      Past Medical History:   Diagnosis Date    Arthritis     Blood circulation, collateral     Cataract     Clostridium difficile infection 1/28/14    GERD (gastroesophageal reflux disease)     Glaucoma     Hyperlipidemia     no meds    Hypertension 2017    essential=pt denies    Macular degeneration     UTI (urinary tract infection)        Past Surgical History:   Procedure Laterality Date    CATARACT REMOVAL Bilateral 2008    COLONOSCOPY      12/17/2009    HERNIA REPAIR Left 06/08/2018    repair of ventral hernia with mesh    HERNIA REPAIR Right 06/04/2020    OPEN RIGHT INGUINAL HERNIA REPAIR WITH MESH performed by Itz Merrill MD at [facility] OR    HYSTERECTOMY (CERVIX STATUS UNKNOWN)  1980    thinks ovaries out, too    PARTIAL HYSTERECTOMY (CERVIX NOT REMOVED)      TOTAL KNEE ARTHROPLASTY      rt 6/9/2009   dr griffin    VARICOSE VEIN SURGERY  [address] [place], 1984 left.    x3       Social History     Socioeconomic History    Marital status:      Spouse name: Not on file    Number of children: 2    Years of education: Not on file    Highest education level: Not on file   Occupational History    Not on file   Tobacco Use    Smoking status: Never    Smokeless tobacco: Never   Vaping Use    Vaping Use: Never used   Substance and

## 2024-03-19 ENCOUNTER — HOSPITAL ENCOUNTER (INPATIENT)
Age: 89
LOS: 3 days | Discharge: HOME | End: 2024-03-22
Attending: STUDENT IN AN ORGANIZED HEALTH CARE EDUCATION/TRAINING PROGRAM | Admitting: STUDENT IN AN ORGANIZED HEALTH CARE EDUCATION/TRAINING PROGRAM
Payer: MEDICARE

## 2024-03-19 ENCOUNTER — APPOINTMENT (OUTPATIENT)
Dept: CT IMAGING | Age: 89
End: 2024-03-19
Payer: MEDICARE

## 2024-03-19 ENCOUNTER — APPOINTMENT (OUTPATIENT)
Dept: GENERAL RADIOLOGY | Age: 89
End: 2024-03-19
Payer: MEDICARE

## 2024-03-19 DIAGNOSIS — Z60.2 LIVES ALONE: ICD-10-CM

## 2024-03-19 DIAGNOSIS — R53.1 GENERALIZED WEAKNESS: ICD-10-CM

## 2024-03-19 DIAGNOSIS — R41.82 ALTERED MENTAL STATUS, UNSPECIFIED ALTERED MENTAL STATUS TYPE: Primary | ICD-10-CM

## 2024-03-19 DIAGNOSIS — R26.9 ABNORMAL GAIT: ICD-10-CM

## 2024-03-19 DIAGNOSIS — Z86.59 HISTORY OF DEMENTIA: ICD-10-CM

## 2024-03-19 PROBLEM — G45.9 TIA (TRANSIENT ISCHEMIC ATTACK): Status: ACTIVE | Noted: 2024-03-19

## 2024-03-19 LAB
ALBUMIN SERPL-MCNC: 4.2 G/DL (ref 3.4–5)
ALBUMIN/GLOB SERPL: 1.5 {RATIO} (ref 1.1–2.2)
ALP SERPL-CCNC: 74 U/L (ref 40–129)
ALT SERPL-CCNC: 13 U/L (ref 10–40)
ANION GAP SERPL CALCULATED.3IONS-SCNC: 11 MMOL/L (ref 3–16)
AST SERPL-CCNC: 26 U/L (ref 15–37)
BASOPHILS # BLD: 0 K/UL (ref 0–0.2)
BASOPHILS NFR BLD: 0.4 %
BILIRUB SERPL-MCNC: 0.4 MG/DL (ref 0–1)
BILIRUB UR QL STRIP.AUTO: NEGATIVE
BUN SERPL-MCNC: 19 MG/DL (ref 7–20)
CALCIUM SERPL-MCNC: 9.1 MG/DL (ref 8.3–10.6)
CHLORIDE SERPL-SCNC: 101 MMOL/L (ref 99–110)
CLARITY UR: CLEAR
CO2 SERPL-SCNC: 26 MMOL/L (ref 21–32)
COLOR UR: YELLOW
CREAT SERPL-MCNC: 0.6 MG/DL (ref 0.6–1.2)
DEPRECATED RDW RBC AUTO: 13.6 % (ref 12.4–15.4)
EKG ATRIAL RATE: 71 BPM
EKG DIAGNOSIS: NORMAL
EKG P AXIS: 41 DEGREES
EKG P-R INTERVAL: 148 MS
EKG Q-T INTERVAL: 364 MS
EKG QRS DURATION: 74 MS
EKG QTC CALCULATION (BAZETT): 395 MS
EKG R AXIS: -19 DEGREES
EKG T AXIS: 1 DEGREES
EKG VENTRICULAR RATE: 71 BPM
EOSINOPHIL # BLD: 0.1 K/UL (ref 0–0.6)
EOSINOPHIL NFR BLD: 1.1 %
FOLATE SERPL-MCNC: 16.82 NG/ML (ref 4.78–24.2)
GFR SERPLBLD CREATININE-BSD FMLA CKD-EPI: >60 ML/MIN/{1.73_M2}
GLUCOSE BLD-MCNC: 120 MG/DL (ref 70–99)
GLUCOSE SERPL-MCNC: 117 MG/DL (ref 70–99)
GLUCOSE UR STRIP.AUTO-MCNC: NEGATIVE MG/DL
HCT VFR BLD AUTO: 40.7 % (ref 36–48)
HGB BLD-MCNC: 13.7 G/DL (ref 12–16)
HGB UR QL STRIP.AUTO: NEGATIVE
KETONES UR STRIP.AUTO-MCNC: NEGATIVE MG/DL
LEUKOCYTE ESTERASE UR QL STRIP.AUTO: NEGATIVE
LYMPHOCYTES # BLD: 0.8 K/UL (ref 1–5.1)
LYMPHOCYTES NFR BLD: 12.7 %
MAGNESIUM SERPL-MCNC: 2.4 MG/DL (ref 1.8–2.4)
MCH RBC QN AUTO: 30.8 PG (ref 26–34)
MCHC RBC AUTO-ENTMCNC: 33.7 G/DL (ref 31–36)
MCV RBC AUTO: 91.4 FL (ref 80–100)
MONOCYTES # BLD: 0.7 K/UL (ref 0–1.3)
MONOCYTES NFR BLD: 10.5 %
NEUTROPHILS # BLD: 4.9 K/UL (ref 1.7–7.7)
NEUTROPHILS NFR BLD: 75.3 %
NITRITE UR QL STRIP.AUTO: NEGATIVE
PERFORMED ON: ABNORMAL
PH UR STRIP.AUTO: 6.5 [PH] (ref 5–8)
PLATELET # BLD AUTO: 214 K/UL (ref 135–450)
PMV BLD AUTO: 8.3 FL (ref 5–10.5)
POTASSIUM SERPL-SCNC: 4.4 MMOL/L (ref 3.5–5.1)
PROT SERPL-MCNC: 7 G/DL (ref 6.4–8.2)
PROT UR STRIP.AUTO-MCNC: NEGATIVE MG/DL
RBC # BLD AUTO: 4.45 M/UL (ref 4–5.2)
REASON FOR REJECTION: NORMAL
REJECTED TEST: NORMAL
SODIUM SERPL-SCNC: 138 MMOL/L (ref 136–145)
SP GR UR STRIP.AUTO: 1.01 (ref 1–1.03)
TROPONIN, HIGH SENSITIVITY: 11 NG/L (ref 0–14)
TROPONIN, HIGH SENSITIVITY: 11 NG/L (ref 0–14)
TSH SERPL DL<=0.005 MIU/L-ACNC: 1.7 UIU/ML (ref 0.27–4.2)
UA COMPLETE W REFLEX CULTURE PNL UR: NORMAL
UA DIPSTICK W REFLEX MICRO PNL UR: NORMAL
URN SPEC COLLECT METH UR: NORMAL
UROBILINOGEN UR STRIP-ACNC: 1 E.U./DL
VIT B12 SERPL-MCNC: 468 PG/ML (ref 211–911)
WBC # BLD AUTO: 6.5 K/UL (ref 4–11)

## 2024-03-19 PROCEDURE — 83735 ASSAY OF MAGNESIUM: CPT

## 2024-03-19 PROCEDURE — 81003 URINALYSIS AUTO W/O SCOPE: CPT

## 2024-03-19 PROCEDURE — 93010 ELECTROCARDIOGRAM REPORT: CPT | Performed by: INTERNAL MEDICINE

## 2024-03-19 PROCEDURE — 85025 COMPLETE CBC W/AUTO DIFF WBC: CPT

## 2024-03-19 PROCEDURE — 2580000003 HC RX 258: Performed by: INTERNAL MEDICINE

## 2024-03-19 PROCEDURE — 84484 ASSAY OF TROPONIN QUANT: CPT

## 2024-03-19 PROCEDURE — 70450 CT HEAD/BRAIN W/O DYE: CPT

## 2024-03-19 PROCEDURE — 86780 TREPONEMA PALLIDUM: CPT

## 2024-03-19 PROCEDURE — 93005 ELECTROCARDIOGRAM TRACING: CPT | Performed by: NURSE PRACTITIONER

## 2024-03-19 PROCEDURE — 1200000000 HC SEMI PRIVATE

## 2024-03-19 PROCEDURE — 82746 ASSAY OF FOLIC ACID SERUM: CPT

## 2024-03-19 PROCEDURE — 80053 COMPREHEN METABOLIC PANEL: CPT

## 2024-03-19 PROCEDURE — 71045 X-RAY EXAM CHEST 1 VIEW: CPT

## 2024-03-19 PROCEDURE — 82607 VITAMIN B-12: CPT

## 2024-03-19 PROCEDURE — 99285 EMERGENCY DEPT VISIT HI MDM: CPT

## 2024-03-19 PROCEDURE — 84443 ASSAY THYROID STIM HORMONE: CPT

## 2024-03-19 RX ORDER — ASPIRIN 325 MG
325 TABLET ORAL ONCE
Status: DISCONTINUED | OUTPATIENT
Start: 2024-03-19 | End: 2024-03-22 | Stop reason: HOSPADM

## 2024-03-19 RX ORDER — POLYETHYLENE GLYCOL 3350 17 G/17G
17 POWDER, FOR SOLUTION ORAL DAILY PRN
Status: DISCONTINUED | OUTPATIENT
Start: 2024-03-19 | End: 2024-03-22 | Stop reason: HOSPADM

## 2024-03-19 RX ORDER — DULOXETIN HYDROCHLORIDE 20 MG/1
20 CAPSULE, DELAYED RELEASE ORAL DAILY
Status: DISCONTINUED | OUTPATIENT
Start: 2024-03-20 | End: 2024-03-22 | Stop reason: HOSPADM

## 2024-03-19 RX ORDER — ACETAMINOPHEN 500 MG
1000 TABLET ORAL EVERY 8 HOURS PRN
Status: DISCONTINUED | OUTPATIENT
Start: 2024-03-19 | End: 2024-03-22 | Stop reason: HOSPADM

## 2024-03-19 RX ORDER — PANTOPRAZOLE SODIUM 40 MG/1
40 TABLET, DELAYED RELEASE ORAL EVERY MORNING
Status: DISCONTINUED | OUTPATIENT
Start: 2024-03-20 | End: 2024-03-22 | Stop reason: HOSPADM

## 2024-03-19 RX ORDER — SODIUM CHLORIDE 0.9 % (FLUSH) 0.9 %
5-40 SYRINGE (ML) INJECTION PRN
Status: DISCONTINUED | OUTPATIENT
Start: 2024-03-19 | End: 2024-03-22 | Stop reason: HOSPADM

## 2024-03-19 RX ORDER — METOPROLOL SUCCINATE 25 MG/1
25 TABLET, EXTENDED RELEASE ORAL DAILY
Status: DISCONTINUED | OUTPATIENT
Start: 2024-03-20 | End: 2024-03-22 | Stop reason: HOSPADM

## 2024-03-19 RX ORDER — ENOXAPARIN SODIUM 100 MG/ML
40 INJECTION SUBCUTANEOUS DAILY
Status: DISCONTINUED | OUTPATIENT
Start: 2024-03-20 | End: 2024-03-22 | Stop reason: HOSPADM

## 2024-03-19 RX ORDER — SODIUM CHLORIDE 0.9 % (FLUSH) 0.9 %
5-40 SYRINGE (ML) INJECTION EVERY 12 HOURS SCHEDULED
Status: DISCONTINUED | OUTPATIENT
Start: 2024-03-19 | End: 2024-03-22 | Stop reason: HOSPADM

## 2024-03-19 RX ORDER — SODIUM CHLORIDE 9 MG/ML
INJECTION, SOLUTION INTRAVENOUS PRN
Status: DISCONTINUED | OUTPATIENT
Start: 2024-03-19 | End: 2024-03-22 | Stop reason: HOSPADM

## 2024-03-19 RX ORDER — ASPIRIN 81 MG/1
81 TABLET, CHEWABLE ORAL NIGHTLY
Status: DISCONTINUED | OUTPATIENT
Start: 2024-03-20 | End: 2024-03-22 | Stop reason: HOSPADM

## 2024-03-19 RX ADMIN — Medication 10 ML: at 21:00

## 2024-03-19 SDOH — SOCIAL STABILITY - SOCIAL INSECURITY: PROBLEMS RELATED TO LIVING ALONE: Z60.2

## 2024-03-19 ASSESSMENT — PAIN SCALES - GENERAL: PAINLEVEL_OUTOF10: 0

## 2024-03-19 ASSESSMENT — LIFESTYLE VARIABLES
HOW MANY STANDARD DRINKS CONTAINING ALCOHOL DO YOU HAVE ON A TYPICAL DAY: PATIENT DOES NOT DRINK
HOW OFTEN DO YOU HAVE A DRINK CONTAINING ALCOHOL: NEVER

## 2024-03-19 NOTE — ED PROVIDER NOTES
The Ekg interpreted by me shows  normal sinus rhythm with a rate of 71  Axis is   41  QTc is  normal  Intervals and Durations are unremarkable.      ST Segments: nonspecific changes  When compared to an EKG performed 9/29/2021, the patient's nonspecific changes are more prominent          Isauro Hernández MD  03/19/24 5611

## 2024-03-19 NOTE — ED PROVIDER NOTES
[facility] EMERGENCY DEPARTMENT  EMERGENCY DEPARTMENT ENCOUNTER        Pt Name: Galina Robison  MRN: [id number]  Birthdate 3/11/1929  Date of evaluation: 3/19/2024  Provider: CORINNE Miner - CNP  PCP: Robbie Neff MD  Note Started: 2:28 PM EDT 3/19/24      SULLY. I have evaluated this patient.        CHIEF COMPLAINT       Chief Complaint   Patient presents with    Altered Mental Status     Pt from home with worsening confusion that \"started this morning when she woke up\". Pt also complaining of blurried vision since waking up. Family reports that pt has history of dementia. Pt alert at this time with no signs of distress noted. Pt denies any pain.       HISTORY OF PRESENT ILLNESS: 1 or more Elements     History from : Patient and Family (Son in law (Vishal) and daughter (Maria L), Speech Therapist)    Limitations to history : Patient is A&Ox3, hx of dementia    Galina Robison is a 89 y.o. female with history of dementia who presents to the ED following an episode of altered mental status earlier today.  Patient sees speech therapy weekly, however when the speech therapist arrived at the house today the patient was unable to recognize her, asking her multiple times who she was the speech therapist found this very abnormal. Patient is not complaining of blurry vision and states she was unable to recognize the speech therapist because the sun was in her eyes.The patient was also complaining of tingling legs, fuzziness, and abnormal gait at that time. Patient lives alone at baseline.   Patient denies any numbness, headache, one-sided weakness, blurry vision.  Patient and family members endorse abnormal gait, which they described as wider with more shuffling at the moment.  Patient denies flulike symptoms, nausea, vomiting, diarrhea.  Endorses daily bowel movements but states they have been harder than normal.  Denies urinary frequency, burning with urination, abdominal pain.

## 2024-03-19 NOTE — PROGRESS NOTES
Medication Reconciliation     List of medications patient is currently taking is not complete.     Source of information: 1. EPIC records      Allergies  Ciprofloxacin, Oxycodone-acetaminophen, and Percocet [oxycodone-acetaminophen]     Notes regarding home medications:  1. Patient is altered- unable to determine when medications were last taken  2. Medication list is up to date according to dispense history.    Janel Tirado, Pharmacy Intern  3/19/2024 7:31 PM

## 2024-03-19 NOTE — ED NOTES
Pt arrived to dept via private car w/ family from home. Pt transports via wheelchair from .  Pt c/o AMS at PT today. PT states pt did not recognize her. Pt c/o blurred vision thi morning.  Pt awake, alert and oriented x 3.  Skin warm and dry/normal color for ethnicity.  Resp easy and unlabored.  Pt placed on cardiac monitor.  Call light in reach.  Side rails x 2. . PIV [placed in the left hand. Pt tolerated well. Blood sent to lab. Pt placed on purwick for urine sample. Urine sent to lab.

## 2024-03-20 ENCOUNTER — APPOINTMENT (OUTPATIENT)
Dept: MRI IMAGING | Age: 89
End: 2024-03-20
Payer: MEDICARE

## 2024-03-20 LAB
ANION GAP SERPL CALCULATED.3IONS-SCNC: 11 MMOL/L (ref 3–16)
BUN SERPL-MCNC: 20 MG/DL (ref 7–20)
CALCIUM SERPL-MCNC: 9.4 MG/DL (ref 8.3–10.6)
CHLORIDE SERPL-SCNC: 105 MMOL/L (ref 99–110)
CHOLEST SERPL-MCNC: 217 MG/DL (ref 0–199)
CO2 SERPL-SCNC: 26 MMOL/L (ref 21–32)
CREAT SERPL-MCNC: 0.6 MG/DL (ref 0.6–1.2)
DEPRECATED RDW RBC AUTO: 13.3 % (ref 12.4–15.4)
EST. AVERAGE GLUCOSE BLD GHB EST-MCNC: 111.2 MG/DL
GFR SERPLBLD CREATININE-BSD FMLA CKD-EPI: >60 ML/MIN/{1.73_M2}
GLUCOSE BLD-MCNC: 156 MG/DL (ref 70–99)
GLUCOSE SERPL-MCNC: 97 MG/DL (ref 70–99)
HBA1C MFR BLD: 5.5 %
HCT VFR BLD AUTO: 38.2 % (ref 36–48)
HDLC SERPL-MCNC: 73 MG/DL (ref 40–60)
HGB BLD-MCNC: 13.4 G/DL (ref 12–16)
LDLC SERPL CALC-MCNC: 127 MG/DL
MCH RBC QN AUTO: 31.3 PG (ref 26–34)
MCHC RBC AUTO-ENTMCNC: 35.1 G/DL (ref 31–36)
MCV RBC AUTO: 89.2 FL (ref 80–100)
PERFORMED ON: ABNORMAL
PLATELET # BLD AUTO: 197 K/UL (ref 135–450)
PMV BLD AUTO: 8.8 FL (ref 5–10.5)
POTASSIUM SERPL-SCNC: 3.9 MMOL/L (ref 3.5–5.1)
RBC # BLD AUTO: 4.28 M/UL (ref 4–5.2)
REAGIN+T PALLIDUM IGG+IGM SERPL-IMP: NORMAL
SODIUM SERPL-SCNC: 142 MMOL/L (ref 136–145)
TRIGL SERPL-MCNC: 86 MG/DL (ref 0–150)
TSH SERPL DL<=0.005 MIU/L-ACNC: 3.94 UIU/ML (ref 0.27–4.2)
VLDLC SERPL CALC-MCNC: 17 MG/DL
WBC # BLD AUTO: 5.6 K/UL (ref 4–11)

## 2024-03-20 PROCEDURE — 70551 MRI BRAIN STEM W/O DYE: CPT

## 2024-03-20 PROCEDURE — 9990000010 HC NO CHARGE VISIT: Performed by: PHYSICAL THERAPIST

## 2024-03-20 PROCEDURE — 93880 EXTRACRANIAL BILAT STUDY: CPT

## 2024-03-20 PROCEDURE — 93306 TTE W/DOPPLER COMPLETE: CPT

## 2024-03-20 PROCEDURE — 97535 SELF CARE MNGMENT TRAINING: CPT

## 2024-03-20 PROCEDURE — 97530 THERAPEUTIC ACTIVITIES: CPT | Performed by: PHYSICAL THERAPIST

## 2024-03-20 PROCEDURE — 83036 HEMOGLOBIN GLYCOSYLATED A1C: CPT

## 2024-03-20 PROCEDURE — 97166 OT EVAL MOD COMPLEX 45 MIN: CPT

## 2024-03-20 PROCEDURE — 85027 COMPLETE CBC AUTOMATED: CPT

## 2024-03-20 PROCEDURE — 6370000000 HC RX 637 (ALT 250 FOR IP): Performed by: INTERNAL MEDICINE

## 2024-03-20 PROCEDURE — 84443 ASSAY THYROID STIM HORMONE: CPT

## 2024-03-20 PROCEDURE — 97162 PT EVAL MOD COMPLEX 30 MIN: CPT | Performed by: PHYSICAL THERAPIST

## 2024-03-20 PROCEDURE — 2580000003 HC RX 258: Performed by: INTERNAL MEDICINE

## 2024-03-20 PROCEDURE — 1200000000 HC SEMI PRIVATE

## 2024-03-20 PROCEDURE — 80048 BASIC METABOLIC PNL TOTAL CA: CPT

## 2024-03-20 PROCEDURE — 36415 COLL VENOUS BLD VENIPUNCTURE: CPT

## 2024-03-20 PROCEDURE — 97116 GAIT TRAINING THERAPY: CPT | Performed by: PHYSICAL THERAPIST

## 2024-03-20 PROCEDURE — 99223 1ST HOSP IP/OBS HIGH 75: CPT | Performed by: STUDENT IN AN ORGANIZED HEALTH CARE EDUCATION/TRAINING PROGRAM

## 2024-03-20 PROCEDURE — 92523 SPEECH SOUND LANG COMPREHEN: CPT

## 2024-03-20 PROCEDURE — 80061 LIPID PANEL: CPT

## 2024-03-20 PROCEDURE — 94760 N-INVAS EAR/PLS OXIMETRY 1: CPT

## 2024-03-20 PROCEDURE — 6360000002 HC RX W HCPCS: Performed by: INTERNAL MEDICINE

## 2024-03-20 RX ADMIN — DULOXETINE HYDROCHLORIDE 20 MG: 20 CAPSULE, DELAYED RELEASE ORAL at 08:35

## 2024-03-20 RX ADMIN — PANTOPRAZOLE SODIUM 40 MG: 40 TABLET, DELAYED RELEASE ORAL at 08:36

## 2024-03-20 RX ADMIN — ENOXAPARIN SODIUM 40 MG: 100 INJECTION SUBCUTANEOUS at 08:36

## 2024-03-20 RX ADMIN — Medication 10 ML: at 08:39

## 2024-03-20 RX ADMIN — ASPIRIN 81 MG: 81 TABLET, CHEWABLE ORAL at 21:50

## 2024-03-20 RX ADMIN — METOPROLOL SUCCINATE 25 MG: 25 TABLET, FILM COATED, EXTENDED RELEASE ORAL at 08:37

## 2024-03-20 RX ADMIN — Medication 10 ML: at 21:50

## 2024-03-20 ASSESSMENT — PAIN SCALES - GENERAL
PAINLEVEL_OUTOF10: 0

## 2024-03-20 ASSESSMENT — ENCOUNTER SYMPTOMS
VOMITING: 0
EYE PAIN: 0
DIARRHEA: 0
ABDOMINAL PAIN: 0
NAUSEA: 0
BACK PAIN: 0
SORE THROAT: 0
COUGH: 0
SHORTNESS OF BREATH: 0
ANAL BLEEDING: 0

## 2024-03-20 NOTE — PROGRESS NOTES
Physical Therapy      Galina Staffordkory  [id number]  [id number]    PT orders received and chart reviewed; attempted to see for PT eval however pt out of room for MRI; will attempt later as schedule permits  Electronically signed by SOTERO FOY, PT on 3/20/2024 at 11:01 AM

## 2024-03-20 NOTE — PROGRESS NOTES
Occupational Therapy Attempt  Galina Robison  3/20/2024  [id number]    OT orders noted, pt chart reviewed. Pt attempted, but is off the unit for MRI. Will follow up as therapy schedule allows.     Electronically signed by Aaron Castillo OTR/L [id number] on 3/20/24 at 10:58 AM EDT

## 2024-03-20 NOTE — PROGRESS NOTES
4 Eyes Skin Assessment     NAME:  Galina Robison  YOB: 1929  MEDICAL RECORD NUMBER:  [id number]    The patient is being assessed for  Admission    I agree that at least one RN has performed a thorough Head to Toe Skin Assessment on the patient. ALL assessment sites listed below have been assessed.      Areas assessed by both nurses:    Head, Face, Ears, Shoulders, Back, Chest, Arms, Elbows, Hands, Sacrum. Buttock, Coccyx, Ischium, Legs. Feet and Heels, and Under Medical Devices         Does the Patient have a Wound? No noted wound(s)       Mohsen Prevention initiated by RN: Yes  Wound Care Orders initiated by RN: No    Pressure Injury (Stage 3,4, Unstageable, DTI, NWPT, and Complex wounds) if present, place Wound referral order by RN under : No    New Ostomies, if present place, Ostomy referral order under : No     Nurse 1 eSignature: Electronically signed by Ivonne Clements RN on 3/20/24 at 12:00 AM EDT    **SHARE this note so that the co-signing nurse can place an eSignature**    Nurse 2 eSignature: Electronically signed by Maksim Weaver RN on 3/20/24 at 12:36 AM EDT

## 2024-03-20 NOTE — PLAN OF CARE
Problem: Discharge Planning  Goal: Discharge to home or other facility with appropriate resources  Outcome: Progressing  Flowsheets (Taken 3/19/2024 2035)  Discharge to home or other facility with appropriate resources:   Identify barriers to discharge with patient and caregiver   Arrange for needed discharge resources and transportation as appropriate   Identify discharge learning needs (meds, wound care, etc)   Refer to discharge planning if patient needs post-hospital services based on physician order or complex needs related to functional status, cognitive ability or social support system     Problem: Pain  Goal: Verbalizes/displays adequate comfort level or baseline comfort level  Outcome: Progressing     Problem: Discharge Planning  Goal: Discharge to home or other facility with appropriate resources  Outcome: Progressing  Flowsheets (Taken 3/19/2024 2035)  Discharge to home or other facility with appropriate resources:   Identify barriers to discharge with patient and caregiver   Arrange for needed discharge resources and transportation as appropriate   Identify discharge learning needs (meds, wound care, etc)   Refer to discharge planning if patient needs post-hospital services based on physician order or complex needs related to functional status, cognitive ability or social support system     Problem: Pain  Goal: Verbalizes/displays adequate comfort level or baseline comfort level  Outcome: Progressing

## 2024-03-20 NOTE — CONSULTS
NEUROLOGY CONSULT NOTE  Reason for Consult: Harrison Yang MD asked me to see Galina Robison in consultation for evaluation of:  TIA    Chief complaint: \"I just felt weird.\"    HISTORY OF PRESENT ILLNESS:  Patient is a poor historian so much of HPI was obtained through EMR review.    Galina Robison is a 89 y.o. female with a PMH that includes reported dementia, HLD, HTN, macular degeneration, arthritis, GERD, and glaucoma.    Patient presented to the ED yesterday with complaints of confusion that started when she woke up.  She was also reportedly having tingling legs and an abnormal gait.  Apparently the patient's speech therapist came to her house yesterday and the patient did not recognize her and asked her several times who she was which was unusual.   In the ED she was alert and without signs of distress.  HCT was non-acute.  BP in /76.  HR 74.  Afebrile.  NIHSS 0.      Today patient states she came to the hospital for testing; she does not really recall the events of yesterday except recalls feeling weird.  She was feeling normal until she had her MRI and now feels a little off again.      Review of the record shows patient followed with Dr. Peterson in the past for idiopathic polyneuropathy.  She was seen here by neurology in 2021 for blurry vision and had a negative MRI and was referred to outpatient ophthalmology.     Patient denies any personal or family history of stroke.  She has never been a smoker.  She was on ASA but no statin PTA.    There was no family at the bedside at the time of evaluation.    MEDICAL HISTORY:  Past Medical History:   Diagnosis Date    Arthritis     Blood circulation, collateral     Cataract     Clostridium difficile infection 1/28/14    GERD (gastroesophageal reflux disease)     Glaucoma     Hyperlipidemia     no meds    Hypertension 2017    essential=pt denies    Macular degeneration     UTI (urinary tract infection)      Past Surgical History:   Procedure  pancreatic       Social History     Tobacco Use   Smoking Status Never   Smokeless Tobacco Never     Social History     Substance and Sexual Activity   Drug Use Never     Social History     Substance and Sexual Activity   Alcohol Use No       ROS:  Constitutional- No weight loss.  No fevers.  Eyes- No diplopia. No photophobia.  Ears/nose/throat- No dysphagia. No dysarthria.  Cardiovascular- No palpitations. No chest pain.  Respiratory- No dyspnea. No cough.  Gastrointestinal- No abdominal pain. No nausea or vomiting.  Genitourinary- No incontinence. No urinary retention.  Musculoskeletal- No myalgia. No arthralgia.  Skin- No rash. No easy bruising.  Psychiatric- No depression. No anxiety.  Endocrine- No diabetes. No thyroid issues.  Hematologic- No bleeding difficulty. No fatigue.  Neurologic- No weakness. No headache.    EXAM:  Vitals:    03/20/24 0331 03/20/24 0655 03/20/24 0752 03/20/24 0837   BP: (!) 151/68 (!) 145/59  (!) 145/59   Pulse: 61 57 58 70   Resp: 15 18 16    Temp: 97.8 °F (36.6 °C) 97.6 °F (36.4 °C)     TempSrc: Oral Oral     SpO2:  97% 97%    Weight: 60.3 kg (132 lb 15 oz)      Height:          Constitutional   Vital signs: BP, HR, temperature, and RR reviewed   General: Awake and alert.  In no apparent distress.  Eyes: Unable to visualize the fundi.  Cardiovascular: Pulses symmetric in all 4 extremities.  Mild BLE edema.  Psychiatric: Cooperative with examination, no psychotic behavior noted.  Neurologic  Mental status:   Orientation: Person, place, time and situation   General fund of knowledge:  Grossly normal.   Memory: Intact to conversation.   Attention: Attends well to the exam.    Language: Fluent in conversation   Comprehension:  Follows commands x4. Adds coins.  Cranial nerves:   CN2: Visual fields full.  CN 3,4,6: Extraocular movements intact. No ptosis.  No nystagmus.  CN5: V1: V2: V3: Intact to light touch in all distributions.  CN7: Upper and lower facial symmetry without

## 2024-03-20 NOTE — PROGRESS NOTES
nausea, vomiting, diarrhea.  Endorses daily bowel movements but states they have been harder than normal.  Denies urinary frequency, burning with urination, abdominal pain.  Denies chest pain, shortness of breath.Was visited by family over the weekend and attended [employer] on Saturday. Patient was diagnosed with dementia 3 years ago.  Currently A&O x 3 family expressed concern about recent medication changes, they are worried the patient has not been compliant due to worsening vision.  Additional report obtained from charge nurse who took the EMS call.  She states that the patient was confused regarding who her speech therapist who she knows very well arrived today.  Per report there was a concern regarding patient's baseline poor vision the patient may be taking medications incorrectly as she normally takes her pills based on the feel of the pills but recently had a medication change so there was concern she may be taking medications incorrectly.  Also patient has some tingling in her legs and difficulty ambulating per report.    IMAGING:  CXR: 3/19/2024  IMPRESSION:  No acute cardiopulmonary process.    CT HEAD: 3/19/2024  IMPRESSION:  1. No acute intracranial abnormality.  2. Stable meningioma near the vertex.    BRAIN MRI: ordered 3/19/2024      Primary Complaint: AMS    Pain:  Pain Assessment  Pain Assessment: None - Denies Pain  Pain Level: 0    Vision/ Hearing  Vision  Vision:  (reports blurred vision at baseline L>R; uses magnifier)  Hearing  Hearing:  (bilateral hearing aids)    Assessment:  Cognitive Diagnosis: Patient appears to present comparable to level of function reported prior to current episode with reduced novel recall, reduced numeric reasoning, and concern for reduced problem solving/reasoning for non-routine/complex/abstract. SLUMS re-administered with total score of 24 which is stable per [facility] SLP report; patient with breakdown for numeric reasoning and novel recall during SLUMS assessment.

## 2024-03-20 NOTE — H&P
[place] [facility]  History and Physical    Patient's PCP: Robbie Neff MD  Code Status: Full Code  History Obtained From:  patient, chart review    CC: Confusion    HPI:     Galina Robison is a 89-year-old lady with a past medical history significant for hypertension, hyperlipidemia and meningioma who presented from evaluation of confusion and visual changes.    She follows with an outpatient speech therapist, yesterday she was unable to recognize her.  She developed new onset blurry vision that has occurred over the past 2 days.  She does not report any other complaints including chest pain, palpitations, shortness of breath.  No recent falls.  She is alert and oriented currently.  Family was concerned that she may be having visual difficulties and may not be taking medications consistently.    Past Medical / Surgical History:    Past Medical History:   Diagnosis Date    Arthritis     Blood circulation, collateral     Cataract     Clostridium difficile infection 1/28/14    GERD (gastroesophageal reflux disease)     Glaucoma     Hyperlipidemia     no meds    Hypertension 2017    essential=pt denies    Macular degeneration     UTI (urinary tract infection)      Past Surgical History:   Procedure Laterality Date    CATARACT REMOVAL Bilateral 2008    COLONOSCOPY      12/17/2009    HERNIA REPAIR Left 06/08/2018    repair of ventral hernia with mesh    HERNIA REPAIR Right 06/04/2020    OPEN RIGHT INGUINAL HERNIA REPAIR WITH MESH performed by Itz Merrill MD at [facility] OR    HYSTERECTOMY (CERVIX STATUS UNKNOWN)  1980    thinks ovaries out, too    PARTIAL HYSTERECTOMY (CERVIX NOT REMOVED)      TOTAL KNEE ARTHROPLASTY      rt 6/9/2009   dr griffin    VARICOSE VEIN SURGERY  1967 leticia, 1984 left.    x3       Medications Prior to Admission:    No current facility-administered medications on file prior to encounter.     Current Outpatient Medications on File Prior to Encounter   Medication Sig Dispense

## 2024-03-20 NOTE — PROGRESS NOTES
Physical Therapy  Facility/Department: [address] PROGRESSIVE CARE  Physical Therapy Initial Assessment    Name: Galina Robison  : 3/11/1929  MRN: [id number]  Date of Service: 3/20/2024    Discharge Recommendations:  Home with assist PRN, Home with Home health PT   PT Equipment Recommendations  Equipment Needed: No      Galina Robison scored a 19/24 on the AM-PAC short mobility form. Current research shows that an AM-PAC score of 18 or greater is typically associated with a discharge to the patient's home setting. Based on the patient's AM-PAC score and their current functional mobility deficits, it is recommended that the patient have 2-3 sessions per week of Physical Therapy at d/c to increase the patient's independence.  At this time, this patient demonstrates the endurance and safety to discharge home with Home PT and a follow up treatment frequency of 2-3x/wk.  Please see assessment section for further patient specific details.    If patient discharges prior to next session this note will serve as a discharge summary.  Please see below for the latest assessment towards goals.       Patient Diagnosis(es): The primary encounter diagnosis was Altered mental status, unspecified altered mental status type. Diagnoses of Lives alone, Abnormal gait, History of dementia, and Generalized weakness were also pertinent to this visit.  Past Medical History:  has a past medical history of Arthritis, Blood circulation, collateral, Cataract, Clostridium difficile infection, GERD (gastroesophageal reflux disease), Glaucoma, Hyperlipidemia, Hypertension, Macular degeneration, and UTI (urinary tract infection).  Past Surgical History:  has a past surgical history that includes Cataract removal (Bilateral, ); Total knee arthroplasty; Hysterectomy (); Varicose vein surgery ( leticia,  left.); Colonoscopy; hernia repair (Left, 2018); hernia repair (Right, 2020); and Partial hysterectomy.    Assessment

## 2024-03-20 NOTE — PROGRESS NOTES
Functional mobility training, Safety education & training, Self-Care / ADL, Home management training     Restrictions  Restrictions/Precautions  Restrictions/Precautions: Fall Risk    Subjective   General  Chart Reviewed: Yes  Patient assessed for rehabilitation services?: Yes  Additional Pertinent Hx: Per ED note \"Galina Robison is a 89 y.o. female with history of dementia who presents to the ED following an episode of altered mental status earlier today.  Patient sees speech therapy weekly, however when the speech therapist arrived at the house today the patient was unable to recognize her, asking her multiple times who she was the speech therapist found this very abnormal. Patient is not complaining of blurry vision and states she was unable to recognize the speech therapist because the sun was in her eyes.The patient was also complaining of tingling legs, fuzziness, and abnormal gait at that time. Patient lives alone at baseline.\"  Family / Caregiver Present: No  Referring Practitioner: Roland Paulson DO  Diagnosis: TIA  Subjective  Subjective: Pt met b/s, reported no pain, agreeable to OT/PT coeval/tx  General Comment  Comments: Per RN, OK to see     Social/Functional History  Social/Functional History  Lives With: Alone  Type of Home: House  Home Layout: Two level, Bed/Bath upstairs, 1/2 bath on main level, Laundry in basement  Home Access: Stairs to enter with rails  Entrance Stairs - Number of Steps: 1+1  Entrance Stairs - Rails: Right  Bathroom Shower/Tub: Walk-in shower  Bathroom Toilet: Standard  Bathroom Equipment: Grab bars in shower, Shower chair, Grab bars around toilet  Home Equipment: Walker, rolling  Has the patient had two or more falls in the past year or any fall with injury in the past year?: No  ADL Assistance: Independent  Homemaking Assistance: Independent (reports cleaning assistance 1x/month; manages own meds, but son manages finances)  Ambulation Assistance: Independent  Transfer  Assistance: Independent  Vocational: Retired  Active : No  Education: completed high school  Occupation: Retired       Objective   Safety Devices  Type of Devices: Call light within reach;Chair alarm in place;Left in chair;Nurse notified  Restraints  Restraints Initially in Place: No     Toilet Transfers  Toilet - Technique: Ambulating (no device)  Equipment Used: Standard toilet  Toilet Transfer: Stand by assistance  AROM: Within functional limits  Strength: Within functional limits  Coordination: Within functional limits  Tone: Normal  Sensation: Intact (pt reported no N/T)  ADL  Feeding: Setup  Feeding Skilled Clinical Factors: pt required cucumbers in salad to be cut up and dressing to be appplied, but able to eat w/o additional assistance  Grooming: Stand by assistance  Grooming Skilled Clinical Factors: pt completed hand hygiene while standing at sink  Toileting: Stand by assistance  Toileting Skilled Clinical Factors: pt voided urine at toilet, able to complete pericare and clothing management w/ SBA  Functional Mobility: Stand by assistance  Functional Mobility Skilled Clinical Factors: No device. Pt able to complete fxl mob in room w/ SBA for household distances, no LOB  Additional Comments: Pt is anticipated to require up to SBA for full ADLs based on her current strength, endurance, and coordination as observed today.  Skin Care: Other (comment)     Activity Tolerance  Activity Tolerance: Patient tolerated treatment well;Patient limited by fatigue  Bed mobility  Supine to Sit: Stand by assistance  Sit to Supine: Unable to assess  Scooting: Stand by assistance  Bed Mobility Comments: HOB slightly elevated, pt ended session in recliner  Transfers  Sit to stand: Stand by assistance  Stand to sit: Stand by assistance  Transfer Comments: from EOB, to/from toilet, and to recliner  Vision  Vision: Impaired  Vision Exceptions: Wears glasses at all times (vision is slightly blurry this

## 2024-03-20 NOTE — PROGRESS NOTES
Vital signs stable throughout the shift. NIH scored 0 patient had difficulty seeing some words, but not slurring or showing signs of aphasia. This RN walked with patient throughout the halls standby assist and patient tolerated the walk well. Patient resting awake in bed denying any further needs at this time. Will continue to monitor.

## 2024-03-20 NOTE — PROGRESS NOTES
NAME:  Galina Robison  YOB: 1929  MEDICAL RECORD NUMBER:  [id number]  TODAYS DATE:  3/20/2024    Discussed personal risk factors for Stroke/TIA with patient/family, and ways to reduce the risk for a recurrent stroke. Patient's personal risk factors which were identified are:     [] Alcohol Abuse: check with your physician before any alcohol consumption.   [x] Atrial fibrillation: may cause blood clots.  [x] Drug Abuse: Seek help, talk with your doctor  [] Clotting Disorder  [] Diabetes  [x] Family history of stroke or heart disease  [x] High Blood Pressure/Hypertension: work with your physician.  [x] High cholesterol: monitor cholesterol levels with your physician.   [] Overweight/Obesity: work with your physician for your ideal body weight.  [x] Physical Inactivity: get regular exercise as directed by your physician.   [] Personal history of previous TIA or stroke  [x] Poor Diet; decrease salt (sodium) in your diet, follow diet directed by physician.   [] Smoking: Cigarette/Cigar: stop smoking.         Advised pt. that you can reduce your risk for stroke/TIA by modifying/controlling the risk factors that you have. Pt.advised to take the medications as prescribed, which will be detailed in the discharge instructions, and to not stop taking them without consulting their physician. In addition, pt. advised to maintain a healthy diet, exercise regularly and to not smoke.      [facility]'s Stroke treatment and prevention, Managing your recovery  notebook  provided and/or reviewed  with patient/family.  The notebook includes, but not limited to, sections addressing warning signs & symptoms of a stroke, which are: sudden numbness or weakness especially on one side of the body, sudden confusion, difficulty speaking or understanding, sudden changes in vision, sudden dizziness or loss of balance/ coordination, sudden severe headache, syncope and seizure.  The need to call EMS (911) immediately if signs  & symptoms occur is emphasized . The notebook also provides education on Stroke community resources and stroke advocacy.    The need for follow-up after discharge was highlighted with patient/family with them being able to repeat understanding of the importance of this.      Electronically signed by Cosme Syed RN on 3/20/2024 at 10:41 AM

## 2024-03-20 NOTE — PROGRESS NOTES
Pt's son,Kenneth Robison called up to inquire about his mother's condition.Made aware of him.He stated to this RN that as he is her POA he does want us to share his mother updates with him only but not anyone else.

## 2024-03-20 NOTE — PLAN OF CARE
Problem: Discharge Planning  Goal: Discharge to home or other facility with appropriate resources  3/20/2024 1040 by Cosme Syed, RN  Outcome: Progressing  Flowsheets (Taken 3/20/2024 0452 by Ivonne Clements, RN)  Discharge to home or other facility with appropriate resources:   Identify barriers to discharge with patient and caregiver   Arrange for needed discharge resources and transportation as appropriate   Identify discharge learning needs (meds, wound care, etc)   Refer to discharge planning if patient needs post-hospital services based on physician order or complex needs related to functional status, cognitive ability or social support system  3/20/2024 0035 by Ivonne Clements, RN  Outcome: Progressing  Flowsheets (Taken 3/19/2024 2035)  Discharge to home or other facility with appropriate resources:   Identify barriers to discharge with patient and caregiver   Arrange for needed discharge resources and transportation as appropriate   Identify discharge learning needs (meds, wound care, etc)   Refer to discharge planning if patient needs post-hospital services based on physician order or complex needs related to functional status, cognitive ability or social support system     Problem: Pain  Goal: Verbalizes/displays adequate comfort level or baseline comfort level  3/20/2024 1040 by Cosem Syed, RN  Outcome: Progressing  3/20/2024 0035 by Ivonne Clements RN  Outcome: Progressing     Problem: ABCDS Injury Assessment  Goal: Absence of physical injury  Outcome: Progressing     Problem: Skin/Tissue Integrity - Adult  Goal: Skin integrity remains intact  Outcome: Progressing  Goal: Oral mucous membranes remain intact  Outcome: Progressing

## 2024-03-20 NOTE — PROGRESS NOTES
NAME:  Galina Robison  YOB: 1929  MEDICAL RECORD NUMBER:  [id number]  TODAYS DATE:  3/20/2024    Discussed personal risk factors for Stroke/TIA with patient/family, and ways to reduce the risk for a recurrent stroke. Patient's personal risk factors which were identified are:     [] Alcohol Abuse: check with your physician before any alcohol consumption.   [] Atrial fibrillation: may cause blood clots.  [] Drug Abuse: Seek help, talk with your doctor  [x] Clotting Disorder  [x] Diabetes  [] Family history of stroke or heart disease  [] High Blood Pressure/Hypertension: work with your physician.  [x] High cholesterol: monitor cholesterol levels with your physician.   [] Overweight/Obesity: work with your physician for your ideal body weight.  [x] Physical Inactivity: get regular exercise as directed by your physician.   [] Personal history of previous TIA or stroke  [x] Poor Diet; decrease salt (sodium) in your diet, follow diet directed by physician.   [] Smoking: Cigarette/Cigar: stop smoking.         Advised pt. that you can reduce your risk for stroke/TIA by modifying/controlling the risk factors that you have. Pt.advised to take the medications as prescribed, which will be detailed in the discharge instructions, and to not stop taking them without consulting their physician. In addition, pt. advised to maintain a healthy diet, exercise regularly and to not smoke.      [facility]'s Stroke treatment and prevention, Managing your recovery  notebook  provided and/or reviewed  with patient/family.  The notebook includes, but not limited to, sections addressing warning signs & symptoms of a stroke, which are: sudden numbness or weakness especially on one side of the body, sudden confusion, difficulty speaking or understanding, sudden changes in vision, sudden dizziness or loss of balance/ coordination, sudden severe headache, syncope and seizure.  The need to call EMS (911) immediately if signs &  symptoms occur is emphasized . The notebook also provides education on Stroke community resources and stroke advocacy.    The need for follow-up after discharge was highlighted with patient/family with them being able to repeat understanding of the importance of this.      Electronically signed by Ivonne Clements RN on 3/20/2024 at 3:54 AM

## 2024-03-21 LAB
ANION GAP SERPL CALCULATED.3IONS-SCNC: 10 MMOL/L (ref 3–16)
BUN SERPL-MCNC: 18 MG/DL (ref 7–20)
CALCIUM SERPL-MCNC: 9.1 MG/DL (ref 8.3–10.6)
CHLORIDE SERPL-SCNC: 105 MMOL/L (ref 99–110)
CO2 SERPL-SCNC: 24 MMOL/L (ref 21–32)
CREAT SERPL-MCNC: 0.6 MG/DL (ref 0.6–1.2)
DEPRECATED RDW RBC AUTO: 13.7 % (ref 12.4–15.4)
GFR SERPLBLD CREATININE-BSD FMLA CKD-EPI: >60 ML/MIN/{1.73_M2}
GLUCOSE BLD-MCNC: 123 MG/DL (ref 70–99)
GLUCOSE SERPL-MCNC: 95 MG/DL (ref 70–99)
HCT VFR BLD AUTO: 38.4 % (ref 36–48)
HGB BLD-MCNC: 13.1 G/DL (ref 12–16)
MCH RBC QN AUTO: 31.1 PG (ref 26–34)
MCHC RBC AUTO-ENTMCNC: 34.2 G/DL (ref 31–36)
MCV RBC AUTO: 90.7 FL (ref 80–100)
PERFORMED ON: ABNORMAL
PLATELET # BLD AUTO: 203 K/UL (ref 135–450)
PMV BLD AUTO: 8.7 FL (ref 5–10.5)
POTASSIUM SERPL-SCNC: 4.1 MMOL/L (ref 3.5–5.1)
RBC # BLD AUTO: 4.23 M/UL (ref 4–5.2)
SODIUM SERPL-SCNC: 139 MMOL/L (ref 136–145)
WBC # BLD AUTO: 5.1 K/UL (ref 4–11)

## 2024-03-21 PROCEDURE — 97535 SELF CARE MNGMENT TRAINING: CPT

## 2024-03-21 PROCEDURE — 97116 GAIT TRAINING THERAPY: CPT | Performed by: PHYSICAL THERAPIST

## 2024-03-21 PROCEDURE — 97116 GAIT TRAINING THERAPY: CPT

## 2024-03-21 PROCEDURE — 85027 COMPLETE CBC AUTOMATED: CPT

## 2024-03-21 PROCEDURE — 94760 N-INVAS EAR/PLS OXIMETRY 1: CPT

## 2024-03-21 PROCEDURE — 6370000000 HC RX 637 (ALT 250 FOR IP): Performed by: INTERNAL MEDICINE

## 2024-03-21 PROCEDURE — 36415 COLL VENOUS BLD VENIPUNCTURE: CPT

## 2024-03-21 PROCEDURE — 97530 THERAPEUTIC ACTIVITIES: CPT

## 2024-03-21 PROCEDURE — 2580000003 HC RX 258: Performed by: INTERNAL MEDICINE

## 2024-03-21 PROCEDURE — 6360000002 HC RX W HCPCS: Performed by: INTERNAL MEDICINE

## 2024-03-21 PROCEDURE — 80048 BASIC METABOLIC PNL TOTAL CA: CPT

## 2024-03-21 PROCEDURE — 99233 SBSQ HOSP IP/OBS HIGH 50: CPT | Performed by: STUDENT IN AN ORGANIZED HEALTH CARE EDUCATION/TRAINING PROGRAM

## 2024-03-21 PROCEDURE — 97530 THERAPEUTIC ACTIVITIES: CPT | Performed by: PHYSICAL THERAPIST

## 2024-03-21 PROCEDURE — 1200000000 HC SEMI PRIVATE

## 2024-03-21 RX ADMIN — ASPIRIN 81 MG: 81 TABLET, CHEWABLE ORAL at 20:59

## 2024-03-21 RX ADMIN — DULOXETINE HYDROCHLORIDE 20 MG: 20 CAPSULE, DELAYED RELEASE ORAL at 08:24

## 2024-03-21 RX ADMIN — PANTOPRAZOLE SODIUM 40 MG: 40 TABLET, DELAYED RELEASE ORAL at 08:24

## 2024-03-21 RX ADMIN — ENOXAPARIN SODIUM 40 MG: 100 INJECTION SUBCUTANEOUS at 08:24

## 2024-03-21 RX ADMIN — Medication 10 ML: at 20:59

## 2024-03-21 ASSESSMENT — PAIN SCALES - GENERAL
PAINLEVEL_OUTOF10: 0

## 2024-03-21 NOTE — PROGRESS NOTES
[place] [facility]     INPATIENT PROGRESS NOTE  Name: Galina Robison  /Age/Sex: 3/11/1929 (89 y.o. female)   MRN & CSN: [id number] & [id number]  Admission Date/Time: 3/19/2024  2:03 PM   [id number]  Current Hospital Day: Hospital Day: 3   Principal Problem: TIA (transient ischemic attack)  HPI     Patient seen and examined.  Bradycardic on telemetry.  Patient does not report any complaints today.  Resting comfortably.  No seizure activity      Discussed with son via telephone    All other review of systems negative unless noted above.  VITALS   Vitals:    24 1100   BP: (!) 124/57   Pulse: 64   Resp: 16   Temp: 97.3 °F (36.3 °C)   SpO2: 94%         PHYSICAL EXAM   General Appearance:    Alert, cooperative, no distress, appears stated age   Head:    Normocephalic, without obvious abnormality, atraumatic   Eyes:    PERRL, diminished vision   Ears:    Normal TM's and external ear canals, both ears   Nose:   Nares normal, septum midline, mucosa normal, no drainage    or sinus tenderness               Back:     Symmetric, no curvature, ROM normal, no CVA tenderness   Lungs:     Clear to auscultation bilaterally, respirations unlabored   Chest Wall:    No tenderness or deformity    Heart:    Regular rate and rhythm, S1 and S2 normal, no murmur, rub   or gallop         Abdomen:     Soft, non-tender, bowel sounds active all four quadrants,     no masses, no organomegaly               Extremities:   Extremities normal, atraumatic, no cyanosis or edema   Pulses:   2+ and symmetric all extremities   Skin:   Skin color, texture, turgor normal, no rashes or lesions   Lymph nodes:   Cervical, supraclavicular, and axillary nodes normal   Neurologic:   CNII-XII intact, normal strength, sensation and reflexes     throughout     LABS   BMP  Recent Labs     24  1433 24  0459 24  0459    142 139   K 4.4 3.9 4.1    105 105   CO2 26 26 24   BUN 19 20 18

## 2024-03-21 NOTE — PLAN OF CARE
Problem: Discharge Planning  Goal: Discharge to home or other facility with appropriate resources  3/20/2024 2012 by Ivonne Clements RN  Outcome: Progressing  Flowsheets (Taken 3/20/2024 1950)  Discharge to home or other facility with appropriate resources:   Identify barriers to discharge with patient and caregiver   Arrange for needed discharge resources and transportation as appropriate   Identify discharge learning needs (meds, wound care, etc)   Refer to discharge planning if patient needs post-hospital services based on physician order or complex needs related to functional status, cognitive ability or social support system  3/20/2024 1040 by Cosme Syed, RN  Outcome: Progressing  Flowsheets (Taken 3/20/2024 0452 by Ivonne Clements, RN)  Discharge to home or other facility with appropriate resources:   Identify barriers to discharge with patient and caregiver   Arrange for needed discharge resources and transportation as appropriate   Identify discharge learning needs (meds, wound care, etc)   Refer to discharge planning if patient needs post-hospital services based on physician order or complex needs related to functional status, cognitive ability or social support system     Problem: Pain  Goal: Verbalizes/displays adequate comfort level or baseline comfort level  3/20/2024 2012 by Ivonne Clements RN  Outcome: Progressing  3/20/2024 1040 by Cosme Syed RN  Outcome: Progressing     Problem: ABCDS Injury Assessment  Goal: Absence of physical injury  3/20/2024 2012 by Ivonne Clements RN  Outcome: Progressing  3/20/2024 1040 by Cosme Syed, RN  Outcome: Progressing     Problem: Skin/Tissue Integrity - Adult  Goal: Skin integrity remains intact  3/20/2024 2012 by Ivonne Clements RN  Outcome: Progressing  Flowsheets (Taken 3/20/2024 1950)  Skin Integrity Remains Intact:   Monitor for areas of redness and/or skin breakdown   Assess vascular access sites hourly   Every 4-6 hours minimum: Change oxygen saturation  probe site   Every 4-6 hours: If on nasal continuous positive airway pressure, respiratory therapy assesses nares and determine need for appliance change or resting period  3/20/2024 1040 by Cosme Syed, RN  Outcome: Progressing  Goal: Oral mucous membranes remain intact  3/20/2024 2012 by Ivonne Clements, RN  Outcome: Progressing  Flowsheets (Taken 3/20/2024 1950)  Oral Mucous Membranes Remain Intact:   Assess oral mucosa and hygiene practices   Implement preventative oral hygiene regimen   Implement oral medicated treatments as ordered  3/20/2024 1040 by Cosme Syed, RN  Outcome: Progressing

## 2024-03-21 NOTE — PROGRESS NOTES
Call placed to EEG, patient bottom of schedule and will not be done till tomorrow per tech.    Electronically signed by Ameena Kaufman RN on 3/21/2024 at 2:58 PM

## 2024-03-21 NOTE — PROGRESS NOTES
Occupational Therapy  Facility/Department: [facility] PROGRESSIVE CARE  Occupational Therapy Daily Note  Name: Galina Robison  : 3/11/1929  MRN: [id number]  Date of Service: 3/21/2024    Discharge Recommendations:  Patient would benefit from continued therapy after discharge, Home with Home health OT, Home with assist PRN      Galina Robison scored a 20/24 on the AM-PAC ADL Inpatient form. Current research shows that an AM-PAC score of 18 or greater is typically associated with a discharge to the patient's home setting. Based on the patient's AM-PAC score, and their current ADL deficits, it is recommended that the patient have 2-3 sessions per week of Occupational Therapy at d/c to increase the patient's independence.  At this time, this patient demonstrates the endurance and safety to discharge home with HHOT and a follow up treatment frequency of 2-3x/wk.   Please see assessment section for further patient specific details.    If patient discharges prior to next session this note will serve as a discharge summary.  Please see below for the latest assessment towards goals.      Patient Diagnosis(es): The primary encounter diagnosis was Altered mental status, unspecified altered mental status type. Diagnoses of Lives alone, Abnormal gait, History of dementia, and Generalized weakness were also pertinent to this visit.  Past Medical History:  has a past medical history of Arthritis, Blood circulation, collateral, Cataract, Clostridium difficile infection, GERD (gastroesophageal reflux disease), Glaucoma, Hyperlipidemia, Hypertension, Macular degeneration, and UTI (urinary tract infection).  Past Surgical History:  has a past surgical history that includes Cataract removal (Bilateral, ); Total knee arthroplasty; Hysterectomy (); Varicose vein surgery ( leticia, 1984 left.); Colonoscopy; hernia repair (Left, 2018); hernia repair (Right, 2020); and Partial hysterectomy.    Treatment

## 2024-03-21 NOTE — PROGRESS NOTES
NEUROLOGY PROGRESS NOTE  Reason for Consult: Harrison Yang MD asked me to see Galina Robison in consultation for evaluation of:  TIA    Chief complaint: \"I just felt weird.\"    UPDATE 3/21/24:  Chart reviewed including progress notes, labs, vitals, imaging, and other diagnostic testing. Labs updated where appropriate.   Patient was bradycardic overnight with HR 30's to 50's.  No mention of whether or not she was symptomatic in nursing notes. EEG remains pending. Patient seen and examined at the bedside.  Patient states she feels better and less confused today.      There was no family at the bedside at the time of encounter.    MEDICAL HISTORY:  Past Medical History:   Diagnosis Date    Arthritis     Blood circulation, collateral     Cataract     Clostridium difficile infection 1/28/14    GERD (gastroesophageal reflux disease)     Glaucoma     Hyperlipidemia     no meds    Hypertension 2017    essential=pt denies    Macular degeneration     UTI (urinary tract infection)      Past Surgical History:   Procedure Laterality Date    CATARACT REMOVAL Bilateral 2008    COLONOSCOPY      12/17/2009    HERNIA REPAIR Left 06/08/2018    repair of ventral hernia with mesh    HERNIA REPAIR Right 06/04/2020    OPEN RIGHT INGUINAL HERNIA REPAIR WITH MESH performed by Itz Merrill MD at [facility] OR    HYSTERECTOMY (CERVIX STATUS UNKNOWN)  1980    thinks ovaries out, too    PARTIAL HYSTERECTOMY (CERVIX NOT REMOVED)      TOTAL KNEE ARTHROPLASTY      rt 6/9/2009   dr griffin    VARICOSE VEIN SURGERY  1967 leticia, 1984 left.    x3     Scheduled Meds:   sodium chloride flush  5-40 mL IntraVENous 2 times per day    enoxaparin  40 mg SubCUTAneous Daily    aspirin  325 mg Oral Once    aspirin  81 mg Oral Nightly    DULoxetine  20 mg Oral Daily    [Held by provider] metoprolol succinate  25 mg Oral Daily    pantoprazole  40 mg Oral QAM     Continuous Infusions:   sodium chloride       PRN Meds:.sodium chloride flush, sodium

## 2024-03-21 NOTE — PROGRESS NOTES
A call was made to the the internal medicine on call and left a voice mail regarding pt's HR to 39 then back up immediately to 50-54 which is in sinus basil.

## 2024-03-21 NOTE — PROGRESS NOTES
Physical Therapy  Facility/Department: [address] PROGRESSIVE CARE  Physical Therapy Treatment Note    Name: Galina Robison  : 3/11/1929  MRN: [id number]  Date of Service: 3/21/2024    Discharge Recommendations:  Home with assist PRN, Home with Home health PT   PT Equipment Recommendations  Equipment Needed: No      Galina Robison scored a 19/24 on the AM-PAC short mobility form. Current research shows that an AM-PAC score of 18 or greater is typically associated with a discharge to the patient's home setting. Based on the patient's AM-PAC score and their current functional mobility deficits, it is recommended that the patient have 2-3 sessions per week of Physical Therapy at d/c to increase the patient's independence.  At this time, this patient demonstrates the endurance and safety to discharge home with Home PT and a follow up treatment frequency of 2-3x/wk.  Please see assessment section for further patient specific details.    If patient discharges prior to next session this note will serve as a discharge summary.  Please see below for the latest assessment towards goals.       Patient Diagnosis(es): The primary encounter diagnosis was Altered mental status, unspecified altered mental status type. Diagnoses of Lives alone, Abnormal gait, History of dementia, and Generalized weakness were also pertinent to this visit.  Past Medical History:  has a past medical history of Arthritis, Blood circulation, collateral, Cataract, Clostridium difficile infection, GERD (gastroesophageal reflux disease), Glaucoma, Hyperlipidemia, Hypertension, Macular degeneration, and UTI (urinary tract infection).  Past Surgical History:  has a past surgical history that includes Cataract removal (Bilateral, ); Total knee arthroplasty; Hysterectomy (); Varicose vein surgery ( leticia,  left.); Colonoscopy; hernia repair (Left, 2018); hernia repair (Right, 2020); and Partial hysterectomy.    Assessment

## 2024-03-21 NOTE — PROGRESS NOTES
Patient called to speak to RN at bedside. Patient wanted to let RN know that there is family \"friction\" and that she wanted to make sure her son Erin was contacted before her son in-law Vishal was given any information. After thorough discussion and explanation on how HIPAA works, patient verbalized understanding. Patient stated \"when Vishal gets here, he always seems to uhm.. galo overtsep and asks a lot of questions and it makes erin upset when Vishal knows more than he does about me. It just makes me all nervous for when it's my time to go and my two kids cannot get along because of my son in-law.\" Patient informed that in emergencies or phone updates are needed, Erin is listed first and would receive the first call, but if he is to not answer and a family representative is needed, the next contact would be contacted. Patient asked if RN could call and explain all this to son Erin so he does not get upset with staff or family. Treatment team sticky note updated for staff to know.     Electronically signed by Ameena Kaufman RN on 3/21/2024 at 10:36 AM

## 2024-03-22 VITALS
HEIGHT: 60 IN | HEART RATE: 64 BPM | BODY MASS INDEX: 26.49 KG/M2 | TEMPERATURE: 97.1 F | OXYGEN SATURATION: 94 % | WEIGHT: 134.92 LBS | SYSTOLIC BLOOD PRESSURE: 133 MMHG | DIASTOLIC BLOOD PRESSURE: 64 MMHG | RESPIRATION RATE: 20 BRPM

## 2024-03-22 PROBLEM — R41.82 ALTERED MENTAL STATUS: Status: RESOLVED | Noted: 2024-03-22 | Resolved: 2024-03-22

## 2024-03-22 PROBLEM — G45.9 TIA (TRANSIENT ISCHEMIC ATTACK): Status: RESOLVED | Noted: 2024-03-19 | Resolved: 2024-03-22

## 2024-03-22 PROBLEM — R41.82 ALTERED MENTAL STATUS: Status: ACTIVE | Noted: 2024-03-22

## 2024-03-22 PROBLEM — R00.1 SINUS BRADYCARDIA: Status: ACTIVE | Noted: 2024-03-22

## 2024-03-22 LAB
ANION GAP SERPL CALCULATED.3IONS-SCNC: 8 MMOL/L (ref 3–16)
BUN SERPL-MCNC: 22 MG/DL (ref 7–20)
CALCIUM SERPL-MCNC: 9.2 MG/DL (ref 8.3–10.6)
CHLORIDE SERPL-SCNC: 102 MMOL/L (ref 99–110)
CO2 SERPL-SCNC: 27 MMOL/L (ref 21–32)
CREAT SERPL-MCNC: 0.8 MG/DL (ref 0.6–1.2)
DEPRECATED RDW RBC AUTO: 13.6 % (ref 12.4–15.4)
GFR SERPLBLD CREATININE-BSD FMLA CKD-EPI: >60 ML/MIN/{1.73_M2}
GLUCOSE SERPL-MCNC: 98 MG/DL (ref 70–99)
HCT VFR BLD AUTO: 35.2 % (ref 36–48)
HGB BLD-MCNC: 12.1 G/DL (ref 12–16)
MCH RBC QN AUTO: 31.2 PG (ref 26–34)
MCHC RBC AUTO-ENTMCNC: 34.4 G/DL (ref 31–36)
MCV RBC AUTO: 90.6 FL (ref 80–100)
PLATELET # BLD AUTO: 191 K/UL (ref 135–450)
PMV BLD AUTO: 8.7 FL (ref 5–10.5)
POTASSIUM SERPL-SCNC: 4.1 MMOL/L (ref 3.5–5.1)
RBC # BLD AUTO: 3.88 M/UL (ref 4–5.2)
SODIUM SERPL-SCNC: 137 MMOL/L (ref 136–145)
WBC # BLD AUTO: 5.1 K/UL (ref 4–11)

## 2024-03-22 PROCEDURE — 6360000002 HC RX W HCPCS: Performed by: INTERNAL MEDICINE

## 2024-03-22 PROCEDURE — 36415 COLL VENOUS BLD VENIPUNCTURE: CPT

## 2024-03-22 PROCEDURE — 85027 COMPLETE CBC AUTOMATED: CPT

## 2024-03-22 PROCEDURE — 97535 SELF CARE MNGMENT TRAINING: CPT

## 2024-03-22 PROCEDURE — 99222 1ST HOSP IP/OBS MODERATE 55: CPT | Performed by: INTERNAL MEDICINE

## 2024-03-22 PROCEDURE — 97530 THERAPEUTIC ACTIVITIES: CPT

## 2024-03-22 PROCEDURE — 6370000000 HC RX 637 (ALT 250 FOR IP): Performed by: INTERNAL MEDICINE

## 2024-03-22 PROCEDURE — 94760 N-INVAS EAR/PLS OXIMETRY 1: CPT

## 2024-03-22 PROCEDURE — 80048 BASIC METABOLIC PNL TOTAL CA: CPT

## 2024-03-22 PROCEDURE — 95819 EEG AWAKE AND ASLEEP: CPT

## 2024-03-22 RX ADMIN — ENOXAPARIN SODIUM 40 MG: 100 INJECTION SUBCUTANEOUS at 11:19

## 2024-03-22 RX ADMIN — PANTOPRAZOLE SODIUM 40 MG: 40 TABLET, DELAYED RELEASE ORAL at 11:19

## 2024-03-22 RX ADMIN — DULOXETINE HYDROCHLORIDE 20 MG: 20 CAPSULE, DELAYED RELEASE ORAL at 11:19

## 2024-03-22 ASSESSMENT — PAIN SCALES - GENERAL
PAINLEVEL_OUTOF10: 0

## 2024-03-22 NOTE — PROGRESS NOTES
Discharge paperwork and instructions thoroughly discussed with patient and verbalized understanding. Patient is to stop taking metoprolol 25mg and has been instructed to remove from pill boxes when she get home to decrease risk of taking. This RN attempted to go over discharge paperwork to inform family, that was transporting patient, that her metoprolol has been discontinued if they could help her remove the pill from pill box. Family member Vishal, rudely towards staff sharply interrupted RN stating \"She handles that (motioning to patient), I don't touch those.\" RN verbalized understanding but continued to emphasize that it is important and that patient may need assistance to remove if family did not mind assisting. Vishal, son in-law, interrupted again and shouted at RN that \"POA handles her medicines, I do not touch them and I do not know why you're wasting your time talking to me about it\" RN apologized for the inconvenience and patient also apologized for family member's behavior. Patient stated \"I will discuss this with Kenneth when he comes to town tomorrow\". Patient transported off unit via wheelchair with belongings. No complications.     Electronically signed by Ameena Kaufman RN on 3/22/2024 at 6:31 PM

## 2024-03-22 NOTE — PROGRESS NOTES
EEG completed and available for interpretation on the [facility] database .

## 2024-03-22 NOTE — PLAN OF CARE
Problem: Discharge Planning  Goal: Discharge to home or other facility with appropriate resources  Outcome: Adequate for Discharge     Problem: Pain  Goal: Verbalizes/displays adequate comfort level or baseline comfort level  Outcome: Adequate for Discharge     Problem: ABCDS Injury Assessment  Goal: Absence of physical injury  Outcome: Adequate for Discharge     Problem: Skin/Tissue Integrity - Adult  Goal: Skin integrity remains intact  Outcome: Adequate for Discharge  Goal: Oral mucous membranes remain intact  Outcome: Adequate for Discharge

## 2024-03-22 NOTE — CONSULTS
Social Work consult noted for placement. Patient has been evaluated by therapy and placement in a skilled nursing facility is not currently being recommended. This RN CM spoke with patient, who confirmed her desire is to return home. CM to follow and set up home care prior to patient's discharge, as appropriate.    Electronically signed by DEMARIO BROWNING RN on 3/22/2024 at 9:08 AM

## 2024-03-22 NOTE — CARE COORDINATION
Discharge Planning:    Patient open to home healthcare being arranged. Patient stated she would like a referral to [facility] or [facility].    [facility] out of network.  [facility]-referral placed; awaiting determination at this time. CM to continue to follow.    Electronically signed by DEMARIO BROWNING RN on 3/22/2024 at 3:38 PM

## 2024-03-22 NOTE — PROGRESS NOTES
Pt's HR is dropping to 46-51 while she sleeps then comes up to 61 while awakeneing her.Pt denies any chest discomfort or lightheadedness.

## 2024-03-22 NOTE — CONSULTS
History and Physical  [facility]   Cardiology    Chief Complaint: sinus bradycardia, 40s sometimes at night    HPI:     Patient is a 89 y.o. female presented because of PT visitor thought she was unrecognized.   The patient says it was because of bright sun? Also, the family thought the patient's gait was not normal. When she presented to ER her bp and pulse were normal. She julianne any cardiac sx including dizziness, syncope, chest pain.  If she walks many stairs she gets sob, but does not think that is new. She has had rates in 40 range at times when asleep apparently without know sx. Her metoprolol, presumable for high bp, was stopped two days ago. Her asymptomatic bradycardia is likely less so. Cards consult      Past Medical History:   Diagnosis Date    Arthritis     Blood circulation, collateral     Cataract     Clostridium difficile infection 1/28/14    GERD (gastroesophageal reflux disease)     Glaucoma     Hyperlipidemia     no meds    Hypertension 2017    essential=pt denies    Macular degeneration     UTI (urinary tract infection)       Past Surgical History:   Procedure Laterality Date    CATARACT REMOVAL Bilateral 2008    COLONOSCOPY      12/17/2009    HERNIA REPAIR Left 06/08/2018    repair of ventral hernia with mesh    HERNIA REPAIR Right 06/04/2020    OPEN RIGHT INGUINAL HERNIA REPAIR WITH MESH performed by Itz Merrill MD at [facility]    HYSTERECTOMY (CERVIX STATUS UNKNOWN)  1980    thinks ovaries out, too    PARTIAL HYSTERECTOMY (CERVIX NOT REMOVED)      TOTAL KNEE ARTHROPLASTY      rt 6/9/2009   dr griffin    VARICOSE VEIN SURGERY  1967 leticia, 1984 left.    x3        Medications Prior to Admission: amLODIPine (NORVASC) 2.5 MG tablet, TAKE ONE TABLET BY MOUTH DAILY  DULoxetine (CYMBALTA) 20 MG extended release capsule, TAKE 1 CAPSULE BY MOUTH DAILY.  metoprolol succinate (TOPROL XL) 25 MG extended release tablet, TAKE ONE TABLET BY MOUTH DAILY  pantoprazole (PROTONIX) 40 MG tablet,  Sinus bradycardia  Resolved Problems:    * No resolved hospital problems. *      Plan:     The sinus bradycardia is asymptomatic and not a contributor to her presentation by hx and vital sing information.  She has no known significant cad( note coronary calcium though) and thus stopping metoprolol, if not needed for hypertension or other reasons, will likely be find.  At her age with Cor calcium, I suggest half the dose 12.5 mg daily as compromise.  As an aside, if she is getting more confused, always worry about meds such as Duloxetine as a potential contributor.  Neurology is assessing AMS and gait.  Reviewed above with patient and RN. Please call if questions

## 2024-03-22 NOTE — DISCHARGE INSTR - COC
Continuity of Care Form    Patient Name: Galina Robison   :  3/11/1929  MRN:  [id number]    Admit date:  3/19/2024  Discharge date:  3/22    Code Status Order: Full Code   Advance Directives:     Admitting Physician:  Harrison Jarvis MD  PCP: Robbie Neff MD    Discharging Nurse: DHIRAJ Lindquist  Discharging Hospital Unit/Room#: I1M-0814/5110-01  Discharging Unit Phone Number: [phone]    Emergency Contact:   Extended Emergency Contact Information  Primary Emergency Contact: Kenneth Robison  [facility]  Home Phone: [phone]  Relation: Child   needed? No  Secondary Emergency Contact: Jaclyn Escudero  Address: son in law  Home Phone: [phone]  Relation: Other    Past Surgical History:  Past Surgical History:   Procedure Laterality Date    CATARACT REMOVAL Bilateral     COLONOSCOPY      2009    HERNIA REPAIR Left 2018    repair of ventral hernia with mesh    HERNIA REPAIR Right 2020    OPEN RIGHT INGUINAL HERNIA REPAIR WITH MESH performed by Itz Merrill MD at [facility] OR    HYSTERECTOMY (CERVIX STATUS UNKNOWN)      thinks ovaries out, too    PARTIAL HYSTERECTOMY (CERVIX NOT REMOVED)      TOTAL KNEE ARTHROPLASTY      rt 2009   dr griffin    VARICOSE VEIN SURGERY  1967 leticia, 1984 left.    x3       Immunization History:   Immunization History   Administered Date(s) Administered    COVID-19, MODERNA BLUE border, Primary or Immunocompromised, (age 12y+), IM, 100 mcg/0.5mL 2021, 2021, 2021    COVID-19, PFIZER Bivalent, DO NOT Dilute, (age 12y+), IM, 30 mcg/0.3 mL 2022    Influenza 2011, 10/25/2013    Influenza Vaccine, unspecified formulation 10/05/2015    Influenza Virus Vaccine 2008, 2010, 10/20/2014    Influenza Whole 10/19/2010    Influenza, FLUAD, (age 65 y+), Adjuvanted, 0.5mL 10/20/2020, 2021, 2022, 10/10/2023    Influenza, FLUARIX, FLULAVAL, FLUZONE (age 6 mo+) AND AFLURIA, (age 3  Labs or Other Treatments After Discharge: ***    Physician Certification: I certify the above information and transfer of Galina Robison  is necessary for the continuing treatment of the diagnosis listed and that she requires Palliative Care for less 30 days.     Update Admission H&P: No change in H&P    PHYSICIAN SIGNATURE:  Electronically signed by Harrison Jarvis MD on 3/22/24 at 3:44 PM EDT

## 2024-03-22 NOTE — PROGRESS NOTES
R shoulder and reports fatigue from not sleeping well overnight.  Requesting to use the restroom.         Social/Functional History  Social/Functional History  Lives With: Alone  Type of Home: House  Home Layout: Two level, Bed/Bath upstairs, 1/2 bath on main level, Laundry in basement  Home Access: Stairs to enter with rails  Entrance Stairs - Number of Steps: 1+1  Entrance Stairs - Rails: Right  Bathroom Shower/Tub: Walk-in shower  Bathroom Toilet: Standard  Bathroom Equipment: Grab bars in shower, Shower chair, Grab bars around toilet  Home Equipment: Walker, rolling  Has the patient had two or more falls in the past year or any fall with injury in the past year?: No  ADL Assistance: Independent  Homemaking Assistance: Independent (reports cleaning assistance 1x/month; manages own meds, but son manages finances)  Ambulation Assistance: Independent  Transfer Assistance: Independent  Vocational: Retired  Active : No  Education: completed high school  Occupation: Retired       Cognition   Orientation  Overall Orientation Status: Within Functional Limits  Cognition  Overall Cognitive Status: WFL     Objective      Bed mobility  Supine to Sit: Supervision (HOB slightly elevated)  Sit to Supine: Supervision    Transfers  Sit to Stand: Supervision  Stand to Sit: Supervision    Ambulation  Surface: Level tile  Device: No Device  Assistance: Stand by assistance  Quality of Gait: slow and guarded but no LOB  Gait Deviations: Slow Daniela;Decreased head and trunk rotation;Decreased arm swing;Decreased step length;Decreased step height  Distance: 10'x2  Comments: not max distance but pt declined further ambulation due to not sleeping well overnight  More Ambulation?: No  Stairs/Curb  Stairs?: No     Balance  Posture: Good  Sitting - Static: Good  Sitting - Dynamic: Good  Standing - Static: Good  Standing - Dynamic: Good  Comments: pt used restroom during session with supervision           AM-PAC - Mobility    AM-PAC  Basic Mobility - Inpatient   How much help is needed turning from your back to your side while in a flat bed without using bedrails?: None  How much help is needed moving from lying on your back to sitting on the side of a flat bed without using bedrails?: None  How much help is needed moving to and from a bed to a chair?: A Little  How much help is needed standing up from a chair using your arms?: A Little  How much help is needed walking in hospital room?: A Little  How much help is needed climbing 3-5 steps with a railing?: A Little  AM-PAC Inpatient Mobility Raw Score : 20  AM-PAC Inpatient T-Scale Score : 47.67  Mobility Inpatient CMS 0-100% Score: 35.83  Mobility Inpatient CMS G-Code Modifier : CJ            Goals  Short Term Goals  Time Frame for Short Term Goals: by discharge (goals ongoing as of 3/22)  Short Term Goal 1: transfers sup/MI  Short Term Goal 2: amb ' without an AD SBA- MET, new goal indep  Patient Goals   Patient Goals : to go home       Education  Patient Education  Education Given To: Patient  Education Provided: Role of Therapy  Education Method: Verbal  Barriers to Learning: None  Education Outcome: Verbalized understanding      Therapy Time   Individual Concurrent Group Co-treatment   Time In 0741         Time Out 0800         Minutes 19         Timed Code Treatment Minutes: 19 Minutes       Electronically signed by Chantell Meyer, PT [id number] on 3/22/2024 at 8:01 AM

## 2024-03-22 NOTE — CARE COORDINATION
03/22/24 1616   IMM Letter   IMM Letter given to Patient/Family/Significant other/Guardian/POA/by: Provided to patient at bedside by JENNIFER Gamez, LSW. Education provided to patient, patient reported no questions and verbalized understanding. Patient aware of 4 hours allotted time to determine if they choose to pursue Medicare appeal process.   IMM Letter date given: 03/22/24   IMM Letter time given: 1550     Electronically signed by DEMARIO BROWNING RN on 3/22/2024 at 4:16 PM

## 2024-03-22 NOTE — CARE COORDINATION
Case Management Assessment  Initial Evaluation    Date/Time of Evaluation: 3/22/2024 8:20 AM  Assessment Completed by: DEMARIO BROWNING RN    If patient is discharged prior to next notation, then this note serves as note for discharge by case management.    Patient Name: Galina Robison                   YOB: 1929  Diagnosis: TIA (transient ischemic attack) [G45.9]  Abnormal gait [R26.9]  Generalized weakness [R53.1]  History of dementia [Z86.59]  Altered mental status, unspecified altered mental status type [R41.82]  Lives alone [Z60.2]                   Date / Time: 3/19/2024  2:03 PM    Patient Admission Status: Inpatient   Readmission Risk (Low < 19, Mod (19-27), High > 27): Readmission Risk Score: 9.6    Current PCP: Robbie Neff MD  PCP verified by CM? Yes    Chart Reviewed: Yes      History Provided by: Patient  Patient Orientation: Alert and Oriented    Patient Cognition: Alert    Hospitalization in the last 30 days (Readmission):  No    If yes, Readmission Assessment in CM Navigator will be completed.    Advance Directives:      Code Status: Full Code   Patient's Primary Decision Maker is: Named in Scanned ACP Document    Primary Decision Maker: Kenneth Robison - Yesenia - [phone]    Secondary Decision Maker: Jaclyn Escudero - [place] - [phone]    Discharge Planning:    Patient lives with: Alone Type of Home: House  Primary Care Giver: Self  Patient Support Systems include: Children   Current Financial resources: Medicare  Current community resources: None  Current services prior to admission: None            Current DME:              Type of Home Care services:  None    ADLS  Prior functional level: Assistance with the following:, Shopping, Mobility  Current functional level: Shopping, Assistance with the following:, Mobility    PT AM-PAC: 20 /24  OT AM-PAC: 20 /24    Family can provide assistance at DC: Yes  Would you like Case Management to discuss the discharge plan with any

## 2024-03-22 NOTE — PROGRESS NOTES
Occupational Therapy  Facility/Department: [address] PROGRESSIVE CARE  Occupational Daily Treatment Note      Name: Galina Robison  : 3/11/1929  MRN: [id number]  Date of Service: 3/22/2024    Discharge Recommendations:  Patient would benefit from continued therapy after discharge, Home with Home health OT, Home with assist PRN          Patient Diagnosis(es): The primary encounter diagnosis was Altered mental status, unspecified altered mental status type. Diagnoses of Lives alone, Abnormal gait, History of dementia, and Generalized weakness were also pertinent to this visit.  Past Medical History:  has a past medical history of Arthritis, Blood circulation, collateral, Cataract, Clostridium difficile infection, GERD (gastroesophageal reflux disease), Glaucoma, Hyperlipidemia, Hypertension, Macular degeneration, and UTI (urinary tract infection).  Past Surgical History:  has a past surgical history that includes Cataract removal (Bilateral, ); Total knee arthroplasty; Hysterectomy (); Varicose vein surgery ( leticia,  left.); Colonoscopy; hernia repair (Left, 2018); hernia repair (Right, 2020); and Partial hysterectomy.    Treatment Diagnosis: decreased fxl transfers/mob and self care      Assessment   Performance deficits / Impairments: Decreased functional mobility ;Decreased ADL status;Decreased vision/visual deficit  Assessment: Consulted with OT on pt AM PAC score of 20. Pt is supervison for stand to sit transfers and toilet transfers. Pt is stand by assist for sit to stand from low surfaces (couch). Pt was able to complete tolieting with supersion and complete hand hygene and orcal care standing at sink with supervion. Pt has good activity tolerance and was able to complete functional mobility with an even pace and no signs of fatique. Pt required supervion for donning shoes and tieing laces. Pt reported that her vison just felt different and weird but could not clearly express  toileting (which includes using toilet, bedpan, or urinal)?: A Little  How much help is needed for putting on and taking off regular upper body clothing?: A Little  How much help is needed for taking care of personal grooming?: None  How much help for eating meals?: None  AM-PAC Inpatient Daily Activity Raw Score: 20  AM-PAC Inpatient ADL T-Scale Score : 42.03  ADL Inpatient CMS 0-100% Score: 38.32  ADL Inpatient CMS G-Code Modifier : CJ      Goals  Short Term Goals  Time Frame for Short Term Goals: prior to d/c. Status: goals ongoing  Short Term Goal 1: pt will complete toileting w/ Mod I  Short Term Goal 2: pt will complete LB dressing w/ Mod I  Short Term Goal 3: pt will complete bathing w/ Mod I  Short Term Goal 4: pt will complete fxl ADL transfers w/ Mod I  Long Term Goals  Time Frame for Long Term Goals : LTG=STG  Patient Goals   Patient goals : to return home       Therapy Time   Individual Concurrent Group Co-treatment   Time In 1405         Time Out 1443         Minutes 38             Electronically signed by Laquita SAEZ on 3/22/2024 at 3:10 PM    I attest that I was present for and made a skilled and mindful clinical judgement during the treatment of this patient 3/22/2024    Electronically signed by CARON [id number] on 3/22/2024 at 3:11 PM    OTR was consulted with this patients treatment/intervention plan.

## 2024-03-22 NOTE — PROGRESS NOTES
[facility]     INPATIENT PROGRESS NOTE  Name: Galina Robison  /Age/Sex: 3/11/1929 (89 y.o. female)   MRN & CSN: [id number] & [id number]  Admission Date/Time: 3/19/2024  2:03 PM   Location: [unfilled] [id number]  Current Hospital Day: Hospital Day: 4   Principal Problem: TIA (transient ischemic attack)  HPI     Patient seen and examined.  Remains bradycardic on telemetry.  Patient does not report any complaints today.  No seizure activity overnight.  No chest pain or dyspnea    All other review of systems negative unless noted above.  VITALS   Vitals:    24 0825   BP:    Pulse: 52   Resp:    Temp:    SpO2: 97%         PHYSICAL EXAM   General Appearance:    Alert, cooperative, no distress, appears stated age   Head:    Normocephalic, without obvious abnormality, atraumatic   Eyes:    PERRL, diminished vision   Ears:    Normal TM's and external ear canals, both ears   Nose:   Nares normal, septum midline, mucosa normal, no drainage    or sinus tenderness               Back:     Symmetric, no curvature, ROM normal, no CVA tenderness   Lungs:     Clear to auscultation bilaterally, respirations unlabored   Chest Wall:    No tenderness or deformity    Heart:    Regular rate and rhythm, S1 and S2 normal, no murmur, rub   or gallop         Abdomen:     Soft, non-tender, bowel sounds active all four quadrants,     no masses, no organomegaly               Extremities:   Extremities normal, atraumatic, no cyanosis or edema   Pulses:   2+ and symmetric all extremities   Skin:   Skin color, texture, turgor normal, no rashes or lesions   Lymph nodes:   Cervical, supraclavicular, and axillary nodes normal   Neurologic:   CNII-XII intact, normal strength, sensation and reflexes     throughout     LABS   BMP  Recent Labs     24  1433 24  0459 24  0459 24  0444    142 139 137   K 4.4 3.9 4.1 4.1    105 105 102   CO2 26 26 24 27   BUN 19 20 18 22*

## 2024-03-22 NOTE — CARE COORDINATION
CASE MANAGEMENT DISCHARGE SUMMARY:    DISCHARGE DATE: 3/22/2024    DISCHARGED TO: home     Discharging to Facility/ Agency   Name: [facility]  Address:  [address] [place] [address]  Phone:  [phone]  Fax:  [phone]     TRANSPORTATION: via family             TIME: TBD by patient and bedside RN    COMMENTS: Patient, patient's family (Kenneth and Jaclyn), bedside RN, and home healthcare company aware of discharge plan and timeline.    Electronically signed by DEMARIO BROWNING RN on 3/22/2024 at 4:10 PM

## 2024-03-22 NOTE — ACP (ADVANCE CARE PLANNING)
Advance Care Planning     Advance Care Planning Activator (Inpatient)  Conversation Note      Date of ACP Conversation: 3/22/2024     Conversation Conducted with: Patient with Decision Making Capacity    ACP Activator: DEMARIO BROWNING RN    Health Care Decision Maker:     Current Designated Health Care Decision Maker:     Primary Decision Maker: Kenneth Robison - Child - [phone]    Secondary Decision Maker: Jaclyn Escudero - Other - [phone]    Care Preferences    Ventilation:  \"If you were in your present state of health and suddenly became very ill and were unable to breathe on your own, what would your preference be about the use of a ventilator (breathing machine) if it were available to you?\"      Would the patient desire the use of ventilator (breathing machine)?: yes    \"If your health worsens and it becomes clear that your chance of recovery is unlikely, what would your preference be about the use of a ventilator (breathing machine) if it were available to you?\"     Would the patient desire the use of ventilator (breathing machine)?: Yes      Resuscitation  \"CPR works best to restart the heart when there is a sudden event, like a heart attack, in someone who is otherwise healthy. Unfortunately, CPR does not typically restart the heart for people who have serious health conditions or who are very sick.\"    \"In the event your heart stopped as a result of an underlying serious health condition, would you want attempts to be made to restart your heart (answer \"yes\" for attempt to resuscitate) or would you prefer a natural death (answer \"no\" for do not attempt to resuscitate)?\" yes       [] Yes   [] No   Educated Patient / Decision Maker regarding differences between Advance Directives and portable DNR orders.    Length of ACP Conversation in minutes:  5    Conversation Outcomes:  ACP discussion completed    Follow-up plan:    [] Schedule follow-up conversation to continue planning  [] Referred individual

## 2024-03-22 NOTE — PLAN OF CARE
Problem: Discharge Planning  Goal: Discharge to home or other facility with appropriate resources  Outcome: Progressing  Flowsheets (Taken 3/21/2024 2049)  Discharge to home or other facility with appropriate resources:   Identify barriers to discharge with patient and caregiver   Arrange for needed discharge resources and transportation as appropriate   Identify discharge learning needs (meds, wound care, etc)   Refer to discharge planning if patient needs post-hospital services based on physician order or complex needs related to functional status, cognitive ability or social support system     Problem: Pain  Goal: Verbalizes/displays adequate comfort level or baseline comfort level  Outcome: Progressing     Problem: ABCDS Injury Assessment  Goal: Absence of physical injury  Outcome: Progressing     Problem: Skin/Tissue Integrity - Adult  Goal: Skin integrity remains intact  Outcome: Progressing  Flowsheets (Taken 3/21/2024 2049)  Skin Integrity Remains Intact:   Monitor for areas of redness and/or skin breakdown   Assess vascular access sites hourly   Every 4-6 hours minimum: Change oxygen saturation probe site   Every 4-6 hours: If on nasal continuous positive airway pressure, respiratory therapy assesses nares and determine need for appliance change or resting period  Goal: Oral mucous membranes remain intact  Outcome: Progressing  Flowsheets (Taken 3/21/2024 2049)  Oral Mucous Membranes Remain Intact:   Assess oral mucosa and hygiene practices   Implement preventative oral hygiene regimen   Implement oral medicated treatments as ordered

## 2024-03-26 ENCOUNTER — CARE COORDINATION (OUTPATIENT)
Dept: CASE MANAGEMENT | Age: 89
End: 2024-03-26

## 2024-04-03 ENCOUNTER — CARE COORDINATION (OUTPATIENT)
Dept: CARE COORDINATION | Age: 89
End: 2024-04-03

## 2024-04-03 NOTE — CARE COORDINATION
Care Transitions Follow Up Call    Patient Current Location:  Home: [address] [place] [id number]    LP Care Coordinator contacted the patient by telephone to follow up after admission on 3/19-3/22.  Verified name and  with patient as identifiers.    Patient: Galina Robison  Patient : 3/11/1929   MRN: [id number]  Reason for Admission: AMS  Discharge Date: 3/22/24 RARS: Readmission Risk Score: 8.9      Needs to be reviewed by the provider   Additional needs identified to be addressed with provider: No  none             Method of communication with provider: none.    LPN CC spoke with patient. States she is OK. Denies issues with AMS, speech, gait, facial paralysis, movement. Doing ok with ADLs. Has support from son. Pill packs are extremely helpful & convenient. Appetite good. Denies problems with bowels or bladder. Denies medication changes. Denies needs. PCP f/u moved to  d/t weather.   Shena Root, DAVE CC  Care Transitions  [phone]    Addressed changes since last contact:  none  Discussed follow-up appointments. If no appointment was previously scheduled, appointment scheduling offered: Yes.   Is follow up appointment scheduled within 7 days of discharge? Yes.    Follow Up  Future Appointments   Date Time Provider Department Center   2024 11:20 AM Robbie Neff MD [facility] [place] [facility]   2024  1:40 PM Robbie Neff MD [facility] [place] [facility] [place]     External follow up appointment(s): NA    LPN Care Coordinator reviewed medical action plan and red flags with patient and discussed any barriers to care and/or understanding of plan of care after discharge. Discussed appropriate site of care based on symptoms and resources available to patient including: PCP  Urgent care clinics  [facility]  When to call 911  [facility]. The patient agrees to contact the PCP office for questions related to their healthcare.     Advance Care Planning:   reviewed and current.

## 2024-04-05 PROBLEM — R41.0 CONFUSION: Status: ACTIVE | Noted: 2024-04-05

## 2024-04-10 ENCOUNTER — CARE COORDINATION (OUTPATIENT)
Dept: CASE MANAGEMENT | Age: 89
End: 2024-04-10

## 2024-04-10 NOTE — CARE COORDINATION
Care Transition Nurse provided contact information for future needs.   Plan for next call:  Mental status - [facility] - gait    Christal Patten RN BSN  Care Transition Nurse  [phone]

## 2024-04-17 ENCOUNTER — CARE COORDINATION (OUTPATIENT)
Dept: CASE MANAGEMENT | Age: 89
End: 2024-04-17

## 2024-04-17 NOTE — CARE COORDINATION
services?: Home Health  Do you have any needs or concerns that I can assist you with?: No  Identified Barriers: None  Care Transitions Interventions  Other Interventions:             LPN Care Coordinator provided contact information for future needs. Plan for follow-up call in 5-7 days based on severity of symptoms and risk factors.  Plan for next call: self management-     Kalyani Bauer LPN

## 2024-04-23 ENCOUNTER — CARE COORDINATION (OUTPATIENT)
Dept: CASE MANAGEMENT | Age: 89
End: 2024-04-23

## 2024-04-23 NOTE — CARE COORDINATION
Care Transitions Follow Up Call    Patient Current Location:  Home: [address] [place] [address]    Care Transition Nurse contacted the family by telephone to follow up after admission on 2024.  Verified name and  with family as identifiers.    Patient: Galina Robison  Patient : 3/11/1929   MRN: [id number]  Reason for Admission: AMS  Discharge Date: 3/22/24 RARS: Readmission Risk Score: 8.9      Needs to be reviewed by the provider   Additional needs identified to be addressed with provider: No               Method of communication with provider: none.    Final attempt at CT follow up phone call. Kenneth states \"I would be doing alright if I didn't have to keep talking to you\". He states his mother is doing fine and if there was a problem she would go to the doctor. Writer informed Kenneth that this would be the final CTN outreach.    Follow Up  Future Appointments   Date Time Provider Department Center   2024  1:40 PM Robbie Neff MD [facility] [place]              Care Transition Nurse provided contact information for future needs. No further follow-up call indicated based on severity of symptoms and risk factors.    Christal Patten RN BSN  Care Transition Nurse  [phone]

## 2024-05-18 ENCOUNTER — HOSPITAL ENCOUNTER (INPATIENT)
Age: 89
LOS: 4 days | Discharge: NURSING, REHAB OR LONG-TERM CARE | DRG: 481 | End: 2024-05-22
Attending: STUDENT IN AN ORGANIZED HEALTH CARE EDUCATION/TRAINING PROGRAM | Admitting: STUDENT IN AN ORGANIZED HEALTH CARE EDUCATION/TRAINING PROGRAM
Payer: MEDICARE

## 2024-05-18 ENCOUNTER — APPOINTMENT (OUTPATIENT)
Dept: GENERAL RADIOLOGY | Age: 89
DRG: 481 | End: 2024-05-18
Payer: MEDICARE
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
## 2024-05-18 DIAGNOSIS — S72.142A CLOSED DISPLACED INTERTROCHANTERIC FRACTURE OF LEFT FEMUR, INITIAL ENCOUNTER (HCC): Primary | ICD-10-CM

## 2024-05-18 PROBLEM — S72.002A HIP FRACTURE, LEFT, CLOSED, INITIAL ENCOUNTER (HCC): Status: ACTIVE | Noted: 2024-05-18

## 2024-05-18 LAB
ALBUMIN SERPL-MCNC: 3.8 G/DL (ref 3.4–5)
ALBUMIN/GLOB SERPL: 1.7 {RATIO} (ref 1.1–2.2)
ALP SERPL-CCNC: 60 U/L (ref 40–129)
ALT SERPL-CCNC: 9 U/L (ref 10–40)
ANION GAP SERPL CALCULATED.3IONS-SCNC: 11 MMOL/L (ref 3–16)
AST SERPL-CCNC: 19 U/L (ref 15–37)
BASOPHILS # BLD: 0 K/UL (ref 0–0.2)
BASOPHILS NFR BLD: 0.4 %
BILIRUB SERPL-MCNC: 0.4 MG/DL (ref 0–1)
BUN SERPL-MCNC: 20 MG/DL (ref 7–20)
CALCIUM SERPL-MCNC: 9.3 MG/DL (ref 8.3–10.6)
CHLORIDE SERPL-SCNC: 106 MMOL/L (ref 99–110)
CO2 SERPL-SCNC: 23 MMOL/L (ref 21–32)
CREAT SERPL-MCNC: 0.5 MG/DL (ref 0.6–1.2)
DEPRECATED RDW RBC AUTO: 13.7 % (ref 12.4–15.4)
EOSINOPHIL # BLD: 0 K/UL (ref 0–0.6)
EOSINOPHIL NFR BLD: 0.6 %
GFR SERPLBLD CREATININE-BSD FMLA CKD-EPI: 86 ML/MIN/{1.73_M2}
GLUCOSE SERPL-MCNC: 127 MG/DL (ref 70–99)
HCT VFR BLD AUTO: 37.4 % (ref 36–48)
HGB BLD-MCNC: 12.6 G/DL (ref 12–16)
LYMPHOCYTES # BLD: 0.9 K/UL (ref 1–5.1)
LYMPHOCYTES NFR BLD: 14.2 %
MCH RBC QN AUTO: 30.6 PG (ref 26–34)
MCHC RBC AUTO-ENTMCNC: 33.6 G/DL (ref 31–36)
MCV RBC AUTO: 91 FL (ref 80–100)
MONOCYTES # BLD: 0.6 K/UL (ref 0–1.3)
MONOCYTES NFR BLD: 9.4 %
NEUTROPHILS # BLD: 4.7 K/UL (ref 1.7–7.7)
NEUTROPHILS NFR BLD: 75.4 %
PLATELET # BLD AUTO: 203 K/UL (ref 135–450)
PMV BLD AUTO: 9.4 FL (ref 5–10.5)
POTASSIUM SERPL-SCNC: 3.9 MMOL/L (ref 3.5–5.1)
PROT SERPL-MCNC: 6.1 G/DL (ref 6.4–8.2)
RBC # BLD AUTO: 4.11 M/UL (ref 4–5.2)
SODIUM SERPL-SCNC: 140 MMOL/L (ref 136–145)
WBC # BLD AUTO: 6.2 K/UL (ref 4–11)

## 2024-05-18 PROCEDURE — 6360000002 HC RX W HCPCS

## 2024-05-18 PROCEDURE — 85025 COMPLETE CBC W/AUTO DIFF WBC: CPT

## 2024-05-18 PROCEDURE — 96374 THER/PROPH/DIAG INJ IV PUSH: CPT

## 2024-05-18 PROCEDURE — 36415 COLL VENOUS BLD VENIPUNCTURE: CPT

## 2024-05-18 PROCEDURE — 2580000003 HC RX 258: Performed by: STUDENT IN AN ORGANIZED HEALTH CARE EDUCATION/TRAINING PROGRAM

## 2024-05-18 PROCEDURE — 80053 COMPREHEN METABOLIC PANEL: CPT

## 2024-05-18 PROCEDURE — 73522 X-RAY EXAM HIPS BI 3-4 VIEWS: CPT

## 2024-05-18 PROCEDURE — 93005 ELECTROCARDIOGRAM TRACING: CPT

## 2024-05-18 PROCEDURE — 6370000000 HC RX 637 (ALT 250 FOR IP): Performed by: STUDENT IN AN ORGANIZED HEALTH CARE EDUCATION/TRAINING PROGRAM

## 2024-05-18 PROCEDURE — 72100 X-RAY EXAM L-S SPINE 2/3 VWS: CPT

## 2024-05-18 PROCEDURE — 71045 X-RAY EXAM CHEST 1 VIEW: CPT

## 2024-05-18 PROCEDURE — 1200000000 HC SEMI PRIVATE

## 2024-05-18 PROCEDURE — 99285 EMERGENCY DEPT VISIT HI MDM: CPT

## 2024-05-18 RX ORDER — ONDANSETRON 4 MG/1
4 TABLET, ORALLY DISINTEGRATING ORAL EVERY 8 HOURS PRN
Status: DISCONTINUED | OUTPATIENT
Start: 2024-05-18 | End: 2024-05-22 | Stop reason: HOSPADM

## 2024-05-18 RX ORDER — SODIUM CHLORIDE 9 MG/ML
INJECTION, SOLUTION INTRAVENOUS PRN
Status: DISCONTINUED | OUTPATIENT
Start: 2024-05-18 | End: 2024-05-22 | Stop reason: HOSPADM

## 2024-05-18 RX ORDER — ACETAMINOPHEN 650 MG/1
650 SUPPOSITORY RECTAL EVERY 6 HOURS PRN
Status: DISCONTINUED | OUTPATIENT
Start: 2024-05-18 | End: 2024-05-22 | Stop reason: HOSPADM

## 2024-05-18 RX ORDER — ENOXAPARIN SODIUM 100 MG/ML
40 INJECTION SUBCUTANEOUS DAILY
Status: DISCONTINUED | OUTPATIENT
Start: 2024-05-19 | End: 2024-05-20

## 2024-05-18 RX ORDER — ONDANSETRON 2 MG/ML
4 INJECTION INTRAMUSCULAR; INTRAVENOUS EVERY 6 HOURS PRN
Status: DISCONTINUED | OUTPATIENT
Start: 2024-05-18 | End: 2024-05-22 | Stop reason: HOSPADM

## 2024-05-18 RX ORDER — MAGNESIUM SULFATE IN WATER 40 MG/ML
2000 INJECTION, SOLUTION INTRAVENOUS PRN
Status: DISCONTINUED | OUTPATIENT
Start: 2024-05-18 | End: 2024-05-22 | Stop reason: HOSPADM

## 2024-05-18 RX ORDER — OXYCODONE HYDROCHLORIDE AND ACETAMINOPHEN 5; 325 MG/1; MG/1
1 TABLET ORAL EVERY 4 HOURS PRN
Status: DISCONTINUED | OUTPATIENT
Start: 2024-05-18 | End: 2024-05-19

## 2024-05-18 RX ORDER — ACETAMINOPHEN 325 MG/1
650 TABLET ORAL ONCE
Status: DISCONTINUED | OUTPATIENT
Start: 2024-05-18 | End: 2024-05-18

## 2024-05-18 RX ORDER — POLYETHYLENE GLYCOL 3350 17 G/17G
17 POWDER, FOR SOLUTION ORAL DAILY PRN
Status: DISCONTINUED | OUTPATIENT
Start: 2024-05-18 | End: 2024-05-22 | Stop reason: HOSPADM

## 2024-05-18 RX ORDER — DULOXETIN HYDROCHLORIDE 20 MG/1
20 CAPSULE, DELAYED RELEASE ORAL DAILY
Status: DISCONTINUED | OUTPATIENT
Start: 2024-05-19 | End: 2024-05-22 | Stop reason: HOSPADM

## 2024-05-18 RX ORDER — AMLODIPINE BESYLATE 5 MG/1
2.5 TABLET ORAL DAILY
Status: DISCONTINUED | OUTPATIENT
Start: 2024-05-19 | End: 2024-05-22 | Stop reason: HOSPADM

## 2024-05-18 RX ORDER — SODIUM CHLORIDE 0.9 % (FLUSH) 0.9 %
5-40 SYRINGE (ML) INJECTION EVERY 12 HOURS SCHEDULED
Status: DISCONTINUED | OUTPATIENT
Start: 2024-05-18 | End: 2024-05-22 | Stop reason: HOSPADM

## 2024-05-18 RX ORDER — SODIUM CHLORIDE 0.9 % (FLUSH) 0.9 %
5-40 SYRINGE (ML) INJECTION PRN
Status: DISCONTINUED | OUTPATIENT
Start: 2024-05-18 | End: 2024-05-22 | Stop reason: HOSPADM

## 2024-05-18 RX ORDER — ASPIRIN 81 MG/1
81 TABLET ORAL DAILY
Status: DISCONTINUED | OUTPATIENT
Start: 2024-05-19 | End: 2024-05-22

## 2024-05-18 RX ORDER — POTASSIUM CHLORIDE 20 MEQ/1
40 TABLET, EXTENDED RELEASE ORAL PRN
Status: DISCONTINUED | OUTPATIENT
Start: 2024-05-18 | End: 2024-05-22 | Stop reason: HOSPADM

## 2024-05-18 RX ORDER — FUROSEMIDE 20 MG/1
20 TABLET ORAL NIGHTLY
COMMUNITY

## 2024-05-18 RX ORDER — ACETAMINOPHEN 325 MG/1
650 TABLET ORAL EVERY 6 HOURS PRN
Status: DISCONTINUED | OUTPATIENT
Start: 2024-05-18 | End: 2024-05-22 | Stop reason: HOSPADM

## 2024-05-18 RX ORDER — PANTOPRAZOLE SODIUM 40 MG/1
40 TABLET, DELAYED RELEASE ORAL EVERY MORNING
Status: DISCONTINUED | OUTPATIENT
Start: 2024-05-19 | End: 2024-05-22 | Stop reason: HOSPADM

## 2024-05-18 RX ORDER — POTASSIUM CHLORIDE 7.45 MG/ML
10 INJECTION INTRAVENOUS PRN
Status: DISCONTINUED | OUTPATIENT
Start: 2024-05-18 | End: 2024-05-22 | Stop reason: HOSPADM

## 2024-05-18 RX ADMIN — OXYCODONE HYDROCHLORIDE AND ACETAMINOPHEN 1 TABLET: 5; 325 TABLET ORAL at 20:15

## 2024-05-18 RX ADMIN — HYDROMORPHONE HYDROCHLORIDE 0.5 MG: 1 INJECTION, SOLUTION INTRAMUSCULAR; INTRAVENOUS; SUBCUTANEOUS at 15:46

## 2024-05-18 RX ADMIN — SODIUM CHLORIDE, PRESERVATIVE FREE 10 ML: 5 INJECTION INTRAVENOUS at 20:15

## 2024-05-18 ASSESSMENT — PAIN SCALES - GENERAL
PAINLEVEL_OUTOF10: 7
PAINLEVEL_OUTOF10: 3
PAINLEVEL_OUTOF10: 8
PAINLEVEL_OUTOF10: 8

## 2024-05-18 ASSESSMENT — PAIN DESCRIPTION - DESCRIPTORS
DESCRIPTORS: ACHING
DESCRIPTORS: ACHING

## 2024-05-18 ASSESSMENT — PAIN DESCRIPTION - FREQUENCY
FREQUENCY: CONTINUOUS
FREQUENCY: CONTINUOUS

## 2024-05-18 ASSESSMENT — PAIN DESCRIPTION - ONSET
ONSET: ON-GOING
ONSET: ON-GOING

## 2024-05-18 ASSESSMENT — PAIN DESCRIPTION - ORIENTATION
ORIENTATION: LEFT

## 2024-05-18 ASSESSMENT — PAIN DESCRIPTION - LOCATION
LOCATION: LEG
LOCATION: HIP
LOCATION: HIP
LOCATION: LEG

## 2024-05-18 ASSESSMENT — PAIN DESCRIPTION - PAIN TYPE
TYPE: ACUTE PAIN
TYPE: ACUTE PAIN

## 2024-05-18 NOTE — H&P
V2.0  History and Physical      Name:  Galina Robison /Age/Sex: 3/11/1929  (89 y.o. female)   MRN & CSN:  [id number] & [id number] Encounter Date/Time: 2024 4:35 PM EDT   Location:  [address] [place] [address] PCP: Robbie Neff MD       Hospital Day: 1    Assessment and Plan:   Galina Robison is a 89 y.o. female with a pmh of macular degeneration hypertension hyperlipidemia chronic GERD who presents with Hip fracture, left, closed, initial encounter ([facility])    Hospital Problems             Last Modified POA    * (Principal) Hip fracture, left, closed, initial encounter ([facility]) 2024 Yes       Left-sided hip intertrochanteric fracture orthopedic to be consulted PT OT pain regimen in place.  N.p.o. at midnight for consideration of intervention in the morning DVT prophylaxis with Lovenox.  Telemetry in place  Benign essential hypertension medications reviewed start the patient on amlodipine home medication  Mood disorder continue Cymbalta  Hyperlipidemia  Chronic GERD  Macular degeneration       Medical Decision Making:  The following items were considered in medical decision making:  Discussion of patient care with other providers  Reviewed clinical lab tests if any  Reviewed radiology tests if any  Reviewed other diagnostic tests/interventions  Independent review of radiologic images if any  Microbiology cultures and other micro tests if any    Estimated time spent for medical decision-making encompassing complexity of the case, history taking, medication review, physical examination, communication with family, RN, , discussion with specialists, and ancillary staff members to provide accurate care for the patient was around 20 minutes.    The billing in this case is level 2 due to the combination of above and specifically because of complexity of the case addressing hypertension and hip fracture.    Goals status was discussed in detail with patient.  Verbalized understanding of different options of

## 2024-05-18 NOTE — ED TRIAGE NOTES
Patient presents to ED via [facility] for c/o fall and left hip pain. Patient states she fell at the bottom of her basement steps. Patient denies hitting her head, denies LOC. Left leg appears shortened and externally rotated. Patient states 8/10 pain.

## 2024-05-18 NOTE — PLAN OF CARE
Orthopedics Note    Consult received and chart reviewed.  This is a 89-year-old female who sustained a left intertrochanteric femur fracture from a mechanical fall today.  Tentatively plan for left hip fixation in the OR tomorrow at about 11am.    EDER WALTON MD

## 2024-05-19 ENCOUNTER — ANESTHESIA EVENT (OUTPATIENT)
Dept: OPERATING ROOM | Age: 89
End: 2024-05-19
Payer: MEDICARE

## 2024-05-19 ENCOUNTER — ANESTHESIA (OUTPATIENT)
Dept: OPERATING ROOM | Age: 89
End: 2024-05-19
Payer: MEDICARE

## 2024-05-19 ENCOUNTER — APPOINTMENT (OUTPATIENT)
Dept: GENERAL RADIOLOGY | Age: 89
DRG: 481 | End: 2024-05-19
Payer: MEDICARE

## 2024-05-19 PROBLEM — S72.142A CLOSED DISPLACED INTERTROCHANTERIC FRACTURE OF LEFT FEMUR (HCC): Status: ACTIVE | Noted: 2024-05-18

## 2024-05-19 LAB
ANION GAP SERPL CALCULATED.3IONS-SCNC: 5 MMOL/L (ref 3–16)
BUN SERPL-MCNC: 17 MG/DL (ref 7–20)
CALCIUM SERPL-MCNC: 8.9 MG/DL (ref 8.3–10.6)
CHLORIDE SERPL-SCNC: 106 MMOL/L (ref 99–110)
CO2 SERPL-SCNC: 29 MMOL/L (ref 21–32)
CREAT SERPL-MCNC: 0.6 MG/DL (ref 0.6–1.2)
DEPRECATED RDW RBC AUTO: 13.6 % (ref 12.4–15.4)
EKG ATRIAL RATE: 69 BPM
EKG DIAGNOSIS: NORMAL
EKG P AXIS: 57 DEGREES
EKG P-R INTERVAL: 174 MS
EKG Q-T INTERVAL: 382 MS
EKG QRS DURATION: 74 MS
EKG QTC CALCULATION (BAZETT): 409 MS
EKG R AXIS: 1 DEGREES
EKG T AXIS: 18 DEGREES
EKG VENTRICULAR RATE: 69 BPM
GFR SERPLBLD CREATININE-BSD FMLA CKD-EPI: 83 ML/MIN/{1.73_M2}
GLUCOSE SERPL-MCNC: 122 MG/DL (ref 70–99)
HCT VFR BLD AUTO: 33.6 % (ref 36–48)
HGB BLD-MCNC: 11.6 G/DL (ref 12–16)
MAGNESIUM SERPL-MCNC: 2.3 MG/DL (ref 1.8–2.4)
MCH RBC QN AUTO: 31.2 PG (ref 26–34)
MCHC RBC AUTO-ENTMCNC: 34.5 G/DL (ref 31–36)
MCV RBC AUTO: 90.4 FL (ref 80–100)
PHOSPHATE SERPL-MCNC: 3.9 MG/DL (ref 2.5–4.9)
PLATELET # BLD AUTO: 176 K/UL (ref 135–450)
PMV BLD AUTO: 8.8 FL (ref 5–10.5)
POTASSIUM SERPL-SCNC: 4.9 MMOL/L (ref 3.5–5.1)
RBC # BLD AUTO: 3.71 M/UL (ref 4–5.2)
SODIUM SERPL-SCNC: 140 MMOL/L (ref 136–145)
WBC # BLD AUTO: 7.5 K/UL (ref 4–11)

## 2024-05-19 PROCEDURE — 2720000010 HC SURG SUPPLY STERILE: Performed by: ORTHOPAEDIC SURGERY

## 2024-05-19 PROCEDURE — 2700000000 HC OXYGEN THERAPY PER DAY

## 2024-05-19 PROCEDURE — 3700000001 HC ADD 15 MINUTES (ANESTHESIA): Performed by: ORTHOPAEDIC SURGERY

## 2024-05-19 PROCEDURE — 2580000003 HC RX 258: Performed by: ANESTHESIOLOGY

## 2024-05-19 PROCEDURE — 6360000002 HC RX W HCPCS: Performed by: ANESTHESIOLOGY

## 2024-05-19 PROCEDURE — 85027 COMPLETE CBC AUTOMATED: CPT

## 2024-05-19 PROCEDURE — C1769 GUIDE WIRE: HCPCS | Performed by: ORTHOPAEDIC SURGERY

## 2024-05-19 PROCEDURE — 80048 BASIC METABOLIC PNL TOTAL CA: CPT

## 2024-05-19 PROCEDURE — 84100 ASSAY OF PHOSPHORUS: CPT

## 2024-05-19 PROCEDURE — 93010 ELECTROCARDIOGRAM REPORT: CPT | Performed by: INTERNAL MEDICINE

## 2024-05-19 PROCEDURE — 36415 COLL VENOUS BLD VENIPUNCTURE: CPT

## 2024-05-19 PROCEDURE — 6360000002 HC RX W HCPCS: Performed by: ORTHOPAEDIC SURGERY

## 2024-05-19 PROCEDURE — 2709999900 HC NON-CHARGEABLE SUPPLY: Performed by: ORTHOPAEDIC SURGERY

## 2024-05-19 PROCEDURE — C1713 ANCHOR/SCREW BN/BN,TIS/BN: HCPCS | Performed by: ORTHOPAEDIC SURGERY

## 2024-05-19 PROCEDURE — 2500000003 HC RX 250 WO HCPCS: Performed by: ANESTHESIOLOGY

## 2024-05-19 PROCEDURE — 0QS706Z REPOSITION LEFT UPPER FEMUR WITH INTRAMEDULLARY INTERNAL FIXATION DEVICE, OPEN APPROACH: ICD-10-PCS | Performed by: ORTHOPAEDIC SURGERY

## 2024-05-19 PROCEDURE — 3700000000 HC ANESTHESIA ATTENDED CARE: Performed by: ORTHOPAEDIC SURGERY

## 2024-05-19 PROCEDURE — 7100000000 HC PACU RECOVERY - FIRST 15 MIN: Performed by: ORTHOPAEDIC SURGERY

## 2024-05-19 PROCEDURE — 94761 N-INVAS EAR/PLS OXIMETRY MLT: CPT

## 2024-05-19 PROCEDURE — 6360000002 HC RX W HCPCS: Performed by: STUDENT IN AN ORGANIZED HEALTH CARE EDUCATION/TRAINING PROGRAM

## 2024-05-19 PROCEDURE — 73502 X-RAY EXAM HIP UNI 2-3 VIEWS: CPT

## 2024-05-19 PROCEDURE — 83735 ASSAY OF MAGNESIUM: CPT

## 2024-05-19 PROCEDURE — 6370000000 HC RX 637 (ALT 250 FOR IP): Performed by: STUDENT IN AN ORGANIZED HEALTH CARE EDUCATION/TRAINING PROGRAM

## 2024-05-19 PROCEDURE — 2580000003 HC RX 258: Performed by: ORTHOPAEDIC SURGERY

## 2024-05-19 PROCEDURE — A4217 STERILE WATER/SALINE, 500 ML: HCPCS | Performed by: ORTHOPAEDIC SURGERY

## 2024-05-19 PROCEDURE — 94150 VITAL CAPACITY TEST: CPT

## 2024-05-19 PROCEDURE — 1200000000 HC SEMI PRIVATE

## 2024-05-19 PROCEDURE — 3600000004 HC SURGERY LEVEL 4 BASE: Performed by: ORTHOPAEDIC SURGERY

## 2024-05-19 PROCEDURE — 3600000014 HC SURGERY LEVEL 4 ADDTL 15MIN: Performed by: ORTHOPAEDIC SURGERY

## 2024-05-19 PROCEDURE — 7100000001 HC PACU RECOVERY - ADDTL 15 MIN: Performed by: ORTHOPAEDIC SURGERY

## 2024-05-19 DEVICE — SCREW BNE L36MM DIA5MM TIB LT GRN TI ST CANN LOK FULL THRD: Type: IMPLANTABLE DEVICE | Site: HIP | Status: FUNCTIONAL

## 2024-05-19 DEVICE — SCREW BNE L90MM DIA10.35MM PROX FEM G TI CANN FOR TFN ADV: Type: IMPLANTABLE DEVICE | Site: HIP | Status: FUNCTIONAL

## 2024-05-19 DEVICE — NAIL IM L235MM DIA11MM 130DEG SHT L PROX FEM GRN TI CANN: Type: IMPLANTABLE DEVICE | Site: HIP | Status: FUNCTIONAL

## 2024-05-19 RX ORDER — OXYCODONE HYDROCHLORIDE 10 MG/1
10 TABLET ORAL EVERY 4 HOURS PRN
Status: DISCONTINUED | OUTPATIENT
Start: 2024-05-19 | End: 2024-05-22 | Stop reason: HOSPADM

## 2024-05-19 RX ORDER — LIDOCAINE HYDROCHLORIDE 20 MG/ML
INJECTION, SOLUTION EPIDURAL; INFILTRATION; INTRACAUDAL; PERINEURAL PRN
Status: DISCONTINUED | OUTPATIENT
Start: 2024-05-19 | End: 2024-05-19 | Stop reason: SDUPTHER

## 2024-05-19 RX ORDER — DEXAMETHASONE SODIUM PHOSPHATE 4 MG/ML
INJECTION, SOLUTION INTRA-ARTICULAR; INTRALESIONAL; INTRAMUSCULAR; INTRAVENOUS; SOFT TISSUE PRN
Status: DISCONTINUED | OUTPATIENT
Start: 2024-05-19 | End: 2024-05-19 | Stop reason: SDUPTHER

## 2024-05-19 RX ORDER — NALOXONE HYDROCHLORIDE 0.4 MG/ML
INJECTION, SOLUTION INTRAMUSCULAR; INTRAVENOUS; SUBCUTANEOUS PRN
Status: DISCONTINUED | OUTPATIENT
Start: 2024-05-19 | End: 2024-05-19 | Stop reason: HOSPADM

## 2024-05-19 RX ORDER — MORPHINE SULFATE 4 MG/ML
4 INJECTION, SOLUTION INTRAMUSCULAR; INTRAVENOUS
Status: DISCONTINUED | OUTPATIENT
Start: 2024-05-19 | End: 2024-05-22 | Stop reason: HOSPADM

## 2024-05-19 RX ORDER — MORPHINE SULFATE 2 MG/ML
2 INJECTION, SOLUTION INTRAMUSCULAR; INTRAVENOUS
Status: DISCONTINUED | OUTPATIENT
Start: 2024-05-19 | End: 2024-05-22 | Stop reason: HOSPADM

## 2024-05-19 RX ORDER — CEFAZOLIN SODIUM 1 G/3ML
INJECTION, POWDER, FOR SOLUTION INTRAMUSCULAR; INTRAVENOUS PRN
Status: DISCONTINUED | OUTPATIENT
Start: 2024-05-19 | End: 2024-05-19 | Stop reason: SDUPTHER

## 2024-05-19 RX ORDER — PROPOFOL 10 MG/ML
INJECTION, EMULSION INTRAVENOUS PRN
Status: DISCONTINUED | OUTPATIENT
Start: 2024-05-19 | End: 2024-05-19 | Stop reason: SDUPTHER

## 2024-05-19 RX ORDER — SODIUM CHLORIDE 9 MG/ML
INJECTION, SOLUTION INTRAVENOUS CONTINUOUS PRN
Status: DISCONTINUED | OUTPATIENT
Start: 2024-05-19 | End: 2024-05-19 | Stop reason: SDUPTHER

## 2024-05-19 RX ORDER — FENTANYL CITRATE 50 UG/ML
INJECTION, SOLUTION INTRAMUSCULAR; INTRAVENOUS PRN
Status: DISCONTINUED | OUTPATIENT
Start: 2024-05-19 | End: 2024-05-19 | Stop reason: SDUPTHER

## 2024-05-19 RX ORDER — LABETALOL HYDROCHLORIDE 5 MG/ML
5 INJECTION, SOLUTION INTRAVENOUS
Status: DISCONTINUED | OUTPATIENT
Start: 2024-05-19 | End: 2024-05-19 | Stop reason: HOSPADM

## 2024-05-19 RX ORDER — MAGNESIUM HYDROXIDE 1200 MG/15ML
LIQUID ORAL CONTINUOUS PRN
Status: COMPLETED | OUTPATIENT
Start: 2024-05-19 | End: 2024-05-19

## 2024-05-19 RX ORDER — ONDANSETRON 2 MG/ML
INJECTION INTRAMUSCULAR; INTRAVENOUS PRN
Status: DISCONTINUED | OUTPATIENT
Start: 2024-05-19 | End: 2024-05-19 | Stop reason: SDUPTHER

## 2024-05-19 RX ORDER — DIPHENHYDRAMINE HYDROCHLORIDE 50 MG/ML
12.5 INJECTION INTRAMUSCULAR; INTRAVENOUS
Status: DISCONTINUED | OUTPATIENT
Start: 2024-05-19 | End: 2024-05-19 | Stop reason: HOSPADM

## 2024-05-19 RX ORDER — SODIUM CHLORIDE 0.9 % (FLUSH) 0.9 %
5-40 SYRINGE (ML) INJECTION EVERY 12 HOURS SCHEDULED
Status: DISCONTINUED | OUTPATIENT
Start: 2024-05-19 | End: 2024-05-19 | Stop reason: HOSPADM

## 2024-05-19 RX ORDER — SODIUM CHLORIDE 0.9 % (FLUSH) 0.9 %
5-40 SYRINGE (ML) INJECTION PRN
Status: DISCONTINUED | OUTPATIENT
Start: 2024-05-19 | End: 2024-05-19 | Stop reason: HOSPADM

## 2024-05-19 RX ORDER — ONDANSETRON 2 MG/ML
4 INJECTION INTRAMUSCULAR; INTRAVENOUS
Status: DISCONTINUED | OUTPATIENT
Start: 2024-05-19 | End: 2024-05-19 | Stop reason: HOSPADM

## 2024-05-19 RX ORDER — FENTANYL CITRATE 0.05 MG/ML
25 INJECTION, SOLUTION INTRAMUSCULAR; INTRAVENOUS EVERY 5 MIN PRN
Status: DISCONTINUED | OUTPATIENT
Start: 2024-05-19 | End: 2024-05-19 | Stop reason: HOSPADM

## 2024-05-19 RX ORDER — BUPIVACAINE HYDROCHLORIDE 5 MG/ML
INJECTION, SOLUTION EPIDURAL; INTRACAUDAL
Status: COMPLETED | OUTPATIENT
Start: 2024-05-19 | End: 2024-05-19

## 2024-05-19 RX ORDER — OXYCODONE HYDROCHLORIDE 5 MG/1
5 TABLET ORAL EVERY 4 HOURS PRN
Status: DISCONTINUED | OUTPATIENT
Start: 2024-05-19 | End: 2024-05-22 | Stop reason: HOSPADM

## 2024-05-19 RX ORDER — SODIUM CHLORIDE 9 MG/ML
INJECTION, SOLUTION INTRAVENOUS PRN
Status: DISCONTINUED | OUTPATIENT
Start: 2024-05-19 | End: 2024-05-19 | Stop reason: HOSPADM

## 2024-05-19 RX ADMIN — SODIUM CHLORIDE 2000 MG: 900 INJECTION INTRAVENOUS at 09:37

## 2024-05-19 RX ADMIN — PHENYLEPHRINE HYDROCHLORIDE 100 MCG: 10 INJECTION INTRAVENOUS at 15:20

## 2024-05-19 RX ADMIN — PHENYLEPHRINE HYDROCHLORIDE 100 MCG: 10 INJECTION INTRAVENOUS at 15:15

## 2024-05-19 RX ADMIN — ASPIRIN 81 MG: 81 TABLET, COATED ORAL at 09:34

## 2024-05-19 RX ADMIN — SODIUM CHLORIDE 2000 MG: 900 INJECTION INTRAVENOUS at 18:45

## 2024-05-19 RX ADMIN — PHENYLEPHRINE HYDROCHLORIDE 100 MCG: 10 INJECTION INTRAVENOUS at 15:25

## 2024-05-19 RX ADMIN — FENTANYL CITRATE 25 MCG: 50 INJECTION INTRAMUSCULAR; INTRAVENOUS at 14:49

## 2024-05-19 RX ADMIN — LIDOCAINE HYDROCHLORIDE 60 MG: 20 INJECTION, SOLUTION EPIDURAL; INFILTRATION; INTRACAUDAL; PERINEURAL at 14:49

## 2024-05-19 RX ADMIN — SODIUM CHLORIDE: 9 INJECTION, SOLUTION INTRAVENOUS at 14:44

## 2024-05-19 RX ADMIN — DULOXETINE HYDROCHLORIDE 20 MG: 20 CAPSULE, DELAYED RELEASE ORAL at 09:34

## 2024-05-19 RX ADMIN — OXYCODONE HYDROCHLORIDE AND ACETAMINOPHEN 1 TABLET: 5; 325 TABLET ORAL at 06:02

## 2024-05-19 RX ADMIN — PROPOFOL 50 MG: 10 INJECTION, EMULSION INTRAVENOUS at 14:49

## 2024-05-19 RX ADMIN — AMLODIPINE BESYLATE 2.5 MG: 5 TABLET ORAL at 09:34

## 2024-05-19 RX ADMIN — ENOXAPARIN SODIUM 40 MG: 100 INJECTION SUBCUTANEOUS at 09:35

## 2024-05-19 RX ADMIN — CEFAZOLIN SODIUM 2 G: 1 INJECTION, POWDER, FOR SOLUTION INTRAMUSCULAR; INTRAVENOUS at 15:01

## 2024-05-19 RX ADMIN — ONDANSETRON 4 MG: 2 INJECTION INTRAMUSCULAR; INTRAVENOUS at 15:05

## 2024-05-19 RX ADMIN — FENTANYL CITRATE 25 MCG: 50 INJECTION INTRAMUSCULAR; INTRAVENOUS at 15:07

## 2024-05-19 RX ADMIN — DEXAMETHASONE SODIUM PHOSPHATE 4 MG: 4 INJECTION, SOLUTION INTRAMUSCULAR; INTRAVENOUS at 15:05

## 2024-05-19 RX ADMIN — FENTANYL CITRATE 25 MCG: 50 INJECTION INTRAMUSCULAR; INTRAVENOUS at 15:02

## 2024-05-19 RX ADMIN — PANTOPRAZOLE SODIUM 40 MG: 40 TABLET, DELAYED RELEASE ORAL at 09:34

## 2024-05-19 ASSESSMENT — PAIN - FUNCTIONAL ASSESSMENT
PAIN_FUNCTIONAL_ASSESSMENT: NONE - DENIES PAIN
PAIN_FUNCTIONAL_ASSESSMENT: NONE - DENIES PAIN
PAIN_FUNCTIONAL_ASSESSMENT: PREVENTS OR INTERFERES SOME ACTIVE ACTIVITIES AND ADLS

## 2024-05-19 ASSESSMENT — PAIN DESCRIPTION - ORIENTATION: ORIENTATION: LEFT

## 2024-05-19 ASSESSMENT — PAIN DESCRIPTION - FREQUENCY: FREQUENCY: CONTINUOUS

## 2024-05-19 ASSESSMENT — PAIN DESCRIPTION - ONSET: ONSET: ON-GOING

## 2024-05-19 ASSESSMENT — PAIN DESCRIPTION - PAIN TYPE: TYPE: ACUTE PAIN

## 2024-05-19 ASSESSMENT — PAIN SCALES - GENERAL: PAINLEVEL_OUTOF10: 8

## 2024-05-19 ASSESSMENT — PAIN DESCRIPTION - DESCRIPTORS: DESCRIPTORS: ACHING

## 2024-05-19 ASSESSMENT — PAIN DESCRIPTION - LOCATION: LOCATION: LEG

## 2024-05-19 NOTE — OP NOTE
Patient: Galina Robison  YOB: 1929  MRN: [id number]    Date of Procedure: 5/19/2024       Pre-Op Diagnosis: Left intertrochanteric femur fracture     Post-Op Diagnosis: Same       Procedure: Left femur cephalomedullary nail     Surgeon: Sony Kaufman MD     Anesthesia: General     Estimated Blood Loss (mL): less than 100      Complications: None     Implants:  Synthes TFNA 11 x 235 mm  Lag screw 90 mm  Distal interlocking screw, 36 mm     Indications:  This is a 89 y.o. female who sustained a left intertrochanteric femur fracture from a mechanical fall.  We discussed the diagnosis and treatment options and I recommended surgical fixation.  We went over the risks and complications of surgery including bleeding, infection, decreased ROM, continued pain, instability, fracture, dislocation, neurovascular injury, post op cognitive disorder, leg length discrepancy, DVT, pulmonary embolism and need for further surgical procedures. Informed consent for surgery was obtained.     Details:  The patient was seen in the preoperative holding area where the site of surgery was marked and informed consent was confirmed.  The patient was brought back to the operating room by OR personnel.  General anesthesia was administered. The patient was positioned supine on the [place] table. A final and formal timeout was then performed which confirmed the correct patient, correct position, and correct site of surgery.  Closed reduction maneuvers were carried out on the traction table.  Fluoroscopic imaging was used to confirm appropriate reduction of the fracture in both AP and lateral planes.  The left lower extremity was then prepped and draped in a standard and sterile fashion. IV antibiotics were administered within 1 hour of skin incision.     A longitudinal incision was made in the lateral thigh just proximal to the greater trochanter.  Sharp dissection was carried down through skin and subcutaneous tissue.  The IT band

## 2024-05-19 NOTE — ED PROVIDER NOTES
[facility] 3W ORTHOPEDICS  EMERGENCY DEPARTMENT ENCOUNTER        Pt Name: Galina Robison  MRN: [id number]  Birthdate 3/11/1929  Date of evaluation: 5/18/2024  Provider: Marguerite Casarez PA-C  PCP: Robbie Neff MD  Note Started: 3:42 PM EDT 5/18/24       I have seen and evaluated this patient with my supervising physician Dipak Alcantar, *.      CHIEF COMPLAINT       Chief Complaint   Patient presents with    Fall    Hip Pain       HISTORY OF PRESENT ILLNESS: 1 or more Elements     History From: Patient            Chief Complaint:Fall, left hip pain    Galina Robison is a 89 y.o. female who presents to the ED with a concern of a fall that happened today as she was going down to her basement steps carrying some packages and she said she possibly tripped over a rug at the bottom of the steps and fell on her bottom.  Denies hitting her head, losing consciousness, the patient is not on blood thinners.  She mentioned having pain trying to move the head left leg, denies numbness, tingling, weakness.    Nursing Notes were all reviewed and agreed with or any disagreements were addressed in the HPI.    REVIEW OF SYSTEMS :      Review of Systems    Positives and Pertinent negatives as per HPI.     SURGICAL HISTORY     Past Surgical History:   Procedure Laterality Date    CATARACT REMOVAL Bilateral 2008    COLONOSCOPY      12/17/2009    HERNIA REPAIR Left 06/08/2018    repair of ventral hernia with mesh    HERNIA REPAIR Right 06/04/2020    OPEN RIGHT INGUINAL HERNIA REPAIR WITH MESH performed by Itz Merrill MD at [facility] OR    HYSTERECTOMY (CERVIX STATUS UNKNOWN)  1980    thinks ovaries out, too    PARTIAL HYSTERECTOMY (CERVIX NOT REMOVED)      TOTAL KNEE ARTHROPLASTY      rt 6/9/2009   dr griffin    VARICOSE VEIN SURGERY  1967 leticia, 1984 left.    x3       CURRENTMEDICATIONS       Current Discharge Medication List        CONTINUE these medications which have NOT CHANGED    Details   furosemide 
critical care out of the total shared critical care time provided. This excludes seperately billable procedures. Critical care time was provided for comminuted displaced intertrochanteric fracture that required close evaluation and/or intervention with concern for potential patient decompensation.      For further details of the patient's emergency department visit, please see the advanced practice provider's documentation.    Dipak Alcantar MD     This report has been produced using speech recognition software and may contain errors related to that system including errors in grammar, punctuation, and spelling, as well as words and phrases that may be inappropriate. If there are any questions or concerns please feel free to contact the dictating provider for clarification.          Dipak Alcantar MD  05/18/24 [id number]

## 2024-05-19 NOTE — PLAN OF CARE
Problem: Discharge Planning  Goal: Discharge to home or other facility with appropriate resources  Outcome: Progressing  Flowsheets (Taken 5/18/2024 1743 by Florencia Mcknight RN)  Discharge to home or other facility with appropriate resources: Identify barriers to discharge with patient and caregiver     Problem: Pain  Goal: Verbalizes/displays adequate comfort level or baseline comfort level  Outcome: Progressing     Problem: Skin/Tissue Integrity  Goal: Absence of new skin breakdown  Description: 1.  Monitor for areas of redness and/or skin breakdown  2.  Assess vascular access sites hourly  3.  Every 4-6 hours minimum:  Change oxygen saturation probe site  4.  Every 4-6 hours:  If on nasal continuous positive airway pressure, respiratory therapy assess nares and determine need for appliance change or resting period.  Outcome: Progressing     Problem: ABCDS Injury Assessment  Goal: Absence of physical injury  Outcome: Progressing  Flowsheets (Taken 5/18/2024 2305)  Absence of Physical Injury: Implement safety measures based on patient assessment     Problem: Safety - Adult  Goal: Free from fall injury  Outcome: Progressing  Flowsheets (Taken 5/18/2024 2305)  Free From Fall Injury: Instruct family/caregiver on patient safety

## 2024-05-20 LAB
ANION GAP SERPL CALCULATED.3IONS-SCNC: 10 MMOL/L (ref 3–16)
BUN SERPL-MCNC: 22 MG/DL (ref 7–20)
CALCIUM SERPL-MCNC: 7.8 MG/DL (ref 8.3–10.6)
CHLORIDE SERPL-SCNC: 105 MMOL/L (ref 99–110)
CO2 SERPL-SCNC: 24 MMOL/L (ref 21–32)
CREAT SERPL-MCNC: 1 MG/DL (ref 0.6–1.2)
DEPRECATED RDW RBC AUTO: 13.8 % (ref 12.4–15.4)
GFR SERPLBLD CREATININE-BSD FMLA CKD-EPI: 52 ML/MIN/{1.73_M2}
GLUCOSE BLD-MCNC: 158 MG/DL (ref 70–99)
GLUCOSE BLD-MCNC: 216 MG/DL (ref 70–99)
GLUCOSE SERPL-MCNC: 161 MG/DL (ref 70–99)
HCT VFR BLD AUTO: 23.3 % (ref 36–48)
HGB BLD-MCNC: 7.9 G/DL (ref 12–16)
MAGNESIUM SERPL-MCNC: 2.1 MG/DL (ref 1.8–2.4)
MCH RBC QN AUTO: 31.1 PG (ref 26–34)
MCHC RBC AUTO-ENTMCNC: 33.9 G/DL (ref 31–36)
MCV RBC AUTO: 91.8 FL (ref 80–100)
PERFORMED ON: ABNORMAL
PERFORMED ON: ABNORMAL
PHOSPHATE SERPL-MCNC: 4.7 MG/DL (ref 2.5–4.9)
PLATELET # BLD AUTO: 151 K/UL (ref 135–450)
PMV BLD AUTO: 9 FL (ref 5–10.5)
POTASSIUM SERPL-SCNC: 4.7 MMOL/L (ref 3.5–5.1)
RBC # BLD AUTO: 2.54 M/UL (ref 4–5.2)
SODIUM SERPL-SCNC: 139 MMOL/L (ref 136–145)
WBC # BLD AUTO: 12.3 K/UL (ref 4–11)

## 2024-05-20 PROCEDURE — 97530 THERAPEUTIC ACTIVITIES: CPT

## 2024-05-20 PROCEDURE — 85027 COMPLETE CBC AUTOMATED: CPT

## 2024-05-20 PROCEDURE — 97166 OT EVAL MOD COMPLEX 45 MIN: CPT

## 2024-05-20 PROCEDURE — 84100 ASSAY OF PHOSPHORUS: CPT

## 2024-05-20 PROCEDURE — 97162 PT EVAL MOD COMPLEX 30 MIN: CPT

## 2024-05-20 PROCEDURE — 2580000003 HC RX 258: Performed by: ORTHOPAEDIC SURGERY

## 2024-05-20 PROCEDURE — 6360000002 HC RX W HCPCS: Performed by: ORTHOPAEDIC SURGERY

## 2024-05-20 PROCEDURE — 94761 N-INVAS EAR/PLS OXIMETRY MLT: CPT

## 2024-05-20 PROCEDURE — 6370000000 HC RX 637 (ALT 250 FOR IP): Performed by: ORTHOPAEDIC SURGERY

## 2024-05-20 PROCEDURE — 2700000000 HC OXYGEN THERAPY PER DAY

## 2024-05-20 PROCEDURE — 80048 BASIC METABOLIC PNL TOTAL CA: CPT

## 2024-05-20 PROCEDURE — 83735 ASSAY OF MAGNESIUM: CPT

## 2024-05-20 PROCEDURE — 36415 COLL VENOUS BLD VENIPUNCTURE: CPT

## 2024-05-20 PROCEDURE — 1200000000 HC SEMI PRIVATE

## 2024-05-20 RX ORDER — ENOXAPARIN SODIUM 100 MG/ML
30 INJECTION SUBCUTANEOUS DAILY
Status: DISCONTINUED | OUTPATIENT
Start: 2024-05-21 | End: 2024-05-22 | Stop reason: SDUPTHER

## 2024-05-20 RX ADMIN — ENOXAPARIN SODIUM 40 MG: 100 INJECTION SUBCUTANEOUS at 08:49

## 2024-05-20 RX ADMIN — PANTOPRAZOLE SODIUM 40 MG: 40 TABLET, DELAYED RELEASE ORAL at 08:49

## 2024-05-20 RX ADMIN — SODIUM CHLORIDE, PRESERVATIVE FREE 10 ML: 5 INJECTION INTRAVENOUS at 21:00

## 2024-05-20 RX ADMIN — ASPIRIN 81 MG: 81 TABLET, COATED ORAL at 08:49

## 2024-05-20 RX ADMIN — SODIUM CHLORIDE 2000 MG: 900 INJECTION INTRAVENOUS at 02:07

## 2024-05-20 RX ADMIN — SODIUM CHLORIDE 100 ML/HR: 9 INJECTION, SOLUTION INTRAVENOUS at 14:04

## 2024-05-20 RX ADMIN — DULOXETINE HYDROCHLORIDE 20 MG: 20 CAPSULE, DELAYED RELEASE ORAL at 08:49

## 2024-05-20 RX ADMIN — AMLODIPINE BESYLATE 2.5 MG: 5 TABLET ORAL at 08:49

## 2024-05-20 ASSESSMENT — PAIN SCALES - GENERAL: PAINLEVEL_OUTOF10: 0

## 2024-05-20 NOTE — PLAN OF CARE
Patient with minimal urine output so far this shift, 175ml. MD made aware. Will continue to monitor.

## 2024-05-20 NOTE — PLAN OF CARE
Problem: Discharge Planning  Goal: Discharge to home or other facility with appropriate resources  Outcome: Progressing  Flowsheets (Taken 5/19/2024 2100)  Discharge to home or other facility with appropriate resources:   Identify barriers to discharge with patient and caregiver   Arrange for needed discharge resources and transportation as appropriate   Identify discharge learning needs (meds, wound care, etc)   Refer to discharge planning if patient needs post-hospital services based on physician order or complex needs related to functional status, cognitive ability or social support system     Problem: Pain  Goal: Verbalizes/displays adequate comfort level or baseline comfort level  Outcome: Progressing     Problem: Skin/Tissue Integrity  Goal: Absence of new skin breakdown  Description: 1.  Monitor for areas of redness and/or skin breakdown  2.  Assess vascular access sites hourly  3.  Every 4-6 hours minimum:  Change oxygen saturation probe site  4.  Every 4-6 hours:  If on nasal continuous positive airway pressure, respiratory therapy assess nares and determine need for appliance change or resting period.  Outcome: Progressing     Problem: ABCDS Injury Assessment  Goal: Absence of physical injury  Outcome: Progressing     Problem: Safety - Adult  Goal: Free from fall injury  Outcome: Progressing  Flowsheets (Taken 5/20/2024 0505)  Free From Fall Injury: Instruct family/caregiver on patient safety

## 2024-05-20 NOTE — DISCHARGE INSTR - COC
Respiratory Treatments: ***  Oxygen Therapy:  is not on home oxygen therapy.  Ventilator:    - No ventilator support    Lab orders for discharge:        Rehab Therapies: Physical Therapy, Occupational Therapy and nursing care  Weight Bearing Status/Restrictions: No weight bearing restrictions  Other Medical Equipment (for information only, NOT a DME order): rolling walker   Other Treatments: ASA 81mg twice at day for 30 days for DVT prophylaxis     Patient's personal belongings (please select all that are sent with patient):  Glasses, Hearing Aides bilateral    RN SIGNATURE:  Electronically signed by Patti Frank RN on 5/22/24 at 3:28 PM EDT    PHYSICIAN SECTION    Prognosis: Good    Condition at Discharge: Stable    Rehab Potential (if transferring to Rehab): Good    Physician Certification: I certify the above orders, information, and transfer of Galina Robison is necessary for the continuing treatment of the diagnosis listed and that she requires Skilled Nursing Facility for less 30 days.     Update Admission H&P: No change in H&P    PHYSICIAN SIGNATURE:  Electronically signed by CORINNE Hernandes CNP on 5/20/24 at 8:45 AM EDT/ Dr Kaufman    CASE MANAGEMENT/SOCIAL WORK SECTION    Inpatient Status Date: 5/18/24    [facility] Readmission Risk Assessment Score:    Discharging to Facility/ Agency   Discharging to Facility/ Agency   Name: [facility]  Address:  [address] [place] [address]   Phone:  [phone]  Fax:  [phone]    / signature: Electronically signed by Christal Tuttle on 5/22/24 at 2:04 PM EDT          Followup Dr Kaufman in 2 weeks   [phone]  [facility], [address] [place] [address], [address]   [id number],   [phone]

## 2024-05-20 NOTE — PLAN OF CARE
Hudstanton performed this morning including Physical therapist Irina, and Occupational therapist Chanel. Discussed plan of care, discharge plan, and dme needs.

## 2024-05-20 NOTE — PLAN OF CARE
Problem: Discharge Planning  Goal: Discharge to home or other facility with appropriate resources  5/20/2024 0914 by Josefina Abraham RN  Outcome: Progressing  5/20/2024 0507 by Gene Obrien RN  Outcome: Progressing  Flowsheets (Taken 5/19/2024 2100)  Discharge to home or other facility with appropriate resources:   Identify barriers to discharge with patient and caregiver   Arrange for needed discharge resources and transportation as appropriate   Identify discharge learning needs (meds, wound care, etc)   Refer to discharge planning if patient needs post-hospital services based on physician order or complex needs related to functional status, cognitive ability or social support system     Problem: Pain  Goal: Verbalizes/displays adequate comfort level or baseline comfort level  5/20/2024 0914 by Josefina Abraham RN  Outcome: Progressing  5/20/2024 0507 by Gene Obrien RN  Outcome: Progressing     Problem: Skin/Tissue Integrity  Goal: Absence of new skin breakdown  Description: 1.  Monitor for areas of redness and/or skin breakdown  2.  Assess vascular access sites hourly  3.  Every 4-6 hours minimum:  Change oxygen saturation probe site  4.  Every 4-6 hours:  If on nasal continuous positive airway pressure, respiratory therapy assess nares and determine need for appliance change or resting period.  5/20/2024 0914 by Josefina Abraham RN  Outcome: Progressing  5/20/2024 0507 by Gene Obrien RN  Outcome: Progressing     Problem: ABCDS Injury Assessment  Goal: Absence of physical injury  5/20/2024 0914 by Josefina Abraham RN  Outcome: Progressing  5/20/2024 0507 by Gene Obrien RN  Outcome: Progressing     Problem: Safety - Adult  Goal: Free from fall injury  5/20/2024 0914 by Josefina Abraham RN  Outcome: Progressing  5/20/2024 0507 by Gene Obrien RN  Outcome: Progressing  Flowsheets (Taken 5/20/2024 0505)  Free From Fall Injury: Instruct family/caregiver on patient

## 2024-05-20 NOTE — ACP (ADVANCE CARE PLANNING)
Advance Care Planning     Advance Care Planning Activator (Inpatient)  Conversation Note      Date of ACP Conversation: 5/20/2024     Conversation Conducted with: Patient with Decision Making Capacity    ACP Activator: Christal Tuttle        Health Care Decision Maker:     Current Designated Health Care Decision Maker:     Primary Decision Maker: Kenneth Robison - Child - [phone]    Secondary Decision Maker: Vishal Escudero - Son-in-Law - [phone]  Click here to complete Healthcare Decision Makers including section of the Healthcare Decision Maker Relationship (ie \"Primary\")      Care Preferences    Ventilation:  \"If you were in your present state of health and suddenly became very ill and were unable to breathe on your own, what would your preference be about the use of a ventilator (breathing machine) if it were available to you?\"      Would the patient desire the use of ventilator (breathing machine)?: yes    \"If your health worsens and it becomes clear that your chance of recovery is unlikely, what would your preference be about the use of a ventilator (breathing machine) if it were available to you?\"     Would the patient desire the use of ventilator (breathing machine)?: No      Resuscitation  \"CPR works best to restart the heart when there is a sudden event, like a heart attack, in someone who is otherwise healthy. Unfortunately, CPR does not typically restart the heart for people who have serious health conditions or who are very sick.\"    \"In the event your heart stopped as a result of an underlying serious health condition, would you want attempts to be made to restart your heart (answer \"yes\" for attempt to resuscitate) or would you prefer a natural death (answer \"no\" for do not attempt to resuscitate)?\" unsure       [] Yes   [] No   Educated Patient / Decision Maker regarding differences between Advance Directives and portable DNR orders.    Length of ACP Conversation in minutes:

## 2024-05-21 LAB
ABO + RH BLD: NORMAL
ANION GAP SERPL CALCULATED.3IONS-SCNC: 8 MMOL/L (ref 3–16)
BLD GP AB SCN SERPL QL: NORMAL
BLOOD BANK DISPENSE STATUS: NORMAL
BLOOD BANK PRODUCT CODE: NORMAL
BPU ID: NORMAL
BUN SERPL-MCNC: 31 MG/DL (ref 7–20)
CALCIUM SERPL-MCNC: 7.6 MG/DL (ref 8.3–10.6)
CHLORIDE SERPL-SCNC: 104 MMOL/L (ref 99–110)
CO2 SERPL-SCNC: 24 MMOL/L (ref 21–32)
CREAT SERPL-MCNC: 0.9 MG/DL (ref 0.6–1.2)
DEPRECATED RDW RBC AUTO: 13.6 % (ref 12.4–15.4)
DESCRIPTION BLOOD BANK: NORMAL
GFR SERPLBLD CREATININE-BSD FMLA CKD-EPI: 59 ML/MIN/{1.73_M2}
GLUCOSE SERPL-MCNC: 143 MG/DL (ref 70–99)
HCT VFR BLD AUTO: 18 % (ref 36–48)
HCT VFR BLD AUTO: 20.2 % (ref 36–48)
HCT VFR BLD AUTO: 21.4 % (ref 36–48)
HGB BLD-MCNC: 6.1 G/DL (ref 12–16)
HGB BLD-MCNC: 7 G/DL (ref 12–16)
HGB BLD-MCNC: 7.4 G/DL (ref 12–16)
MAGNESIUM SERPL-MCNC: 2.2 MG/DL (ref 1.8–2.4)
MCH RBC QN AUTO: 30.9 PG (ref 26–34)
MCHC RBC AUTO-ENTMCNC: 33.9 G/DL (ref 31–36)
MCV RBC AUTO: 91.2 FL (ref 80–100)
PHOSPHATE SERPL-MCNC: 2.4 MG/DL (ref 2.5–4.9)
PLATELET # BLD AUTO: 127 K/UL (ref 135–450)
PMV BLD AUTO: 9.1 FL (ref 5–10.5)
POTASSIUM SERPL-SCNC: 4.5 MMOL/L (ref 3.5–5.1)
RBC # BLD AUTO: 1.97 M/UL (ref 4–5.2)
SODIUM SERPL-SCNC: 136 MMOL/L (ref 136–145)
WBC # BLD AUTO: 9.9 K/UL (ref 4–11)

## 2024-05-21 PROCEDURE — 36430 TRANSFUSION BLD/BLD COMPNT: CPT

## 2024-05-21 PROCEDURE — 36415 COLL VENOUS BLD VENIPUNCTURE: CPT

## 2024-05-21 PROCEDURE — 84100 ASSAY OF PHOSPHORUS: CPT

## 2024-05-21 PROCEDURE — P9016 RBC LEUKOCYTES REDUCED: HCPCS

## 2024-05-21 PROCEDURE — 85027 COMPLETE CBC AUTOMATED: CPT

## 2024-05-21 PROCEDURE — 83735 ASSAY OF MAGNESIUM: CPT

## 2024-05-21 PROCEDURE — 85014 HEMATOCRIT: CPT

## 2024-05-21 PROCEDURE — 86900 BLOOD TYPING SEROLOGIC ABO: CPT

## 2024-05-21 PROCEDURE — 86901 BLOOD TYPING SEROLOGIC RH(D): CPT

## 2024-05-21 PROCEDURE — 30233N1 TRANSFUSION OF NONAUTOLOGOUS RED BLOOD CELLS INTO PERIPHERAL VEIN, PERCUTANEOUS APPROACH: ICD-10-PCS | Performed by: ORTHOPAEDIC SURGERY

## 2024-05-21 PROCEDURE — 6370000000 HC RX 637 (ALT 250 FOR IP): Performed by: ORTHOPAEDIC SURGERY

## 2024-05-21 PROCEDURE — 9990000010 HC NO CHARGE VISIT

## 2024-05-21 PROCEDURE — 80048 BASIC METABOLIC PNL TOTAL CA: CPT

## 2024-05-21 PROCEDURE — 86850 RBC ANTIBODY SCREEN: CPT

## 2024-05-21 PROCEDURE — 2580000003 HC RX 258: Performed by: ORTHOPAEDIC SURGERY

## 2024-05-21 PROCEDURE — 1200000000 HC SEMI PRIVATE

## 2024-05-21 PROCEDURE — 85018 HEMOGLOBIN: CPT

## 2024-05-21 PROCEDURE — 86923 COMPATIBILITY TEST ELECTRIC: CPT

## 2024-05-21 RX ORDER — SODIUM CHLORIDE 9 MG/ML
INJECTION, SOLUTION INTRAVENOUS PRN
Status: DISCONTINUED | OUTPATIENT
Start: 2024-05-21 | End: 2024-05-22 | Stop reason: HOSPADM

## 2024-05-21 RX ADMIN — SODIUM CHLORIDE, PRESERVATIVE FREE 10 ML: 5 INJECTION INTRAVENOUS at 08:26

## 2024-05-21 RX ADMIN — SODIUM CHLORIDE, PRESERVATIVE FREE 10 ML: 5 INJECTION INTRAVENOUS at 20:13

## 2024-05-21 RX ADMIN — PANTOPRAZOLE SODIUM 40 MG: 40 TABLET, DELAYED RELEASE ORAL at 08:25

## 2024-05-21 RX ADMIN — DULOXETINE HYDROCHLORIDE 20 MG: 20 CAPSULE, DELAYED RELEASE ORAL at 08:25

## 2024-05-21 RX ADMIN — AMLODIPINE BESYLATE 2.5 MG: 5 TABLET ORAL at 08:25

## 2024-05-21 RX ADMIN — ASPIRIN 81 MG: 81 TABLET, COATED ORAL at 08:25

## 2024-05-21 NOTE — PLAN OF CARE
Problem: Discharge Planning  Goal: Discharge to home or other facility with appropriate resources  Outcome: Progressing  Flowsheets (Taken 5/21/2024 0449)  Discharge to home or other facility with appropriate resources:   Identify barriers to discharge with patient and caregiver   Arrange for needed discharge resources and transportation as appropriate     Problem: Pain  Goal: Verbalizes/displays adequate comfort level or baseline comfort level  Outcome: Progressing  Flowsheets (Taken 5/21/2024 0449)  Verbalizes/displays adequate comfort level or baseline comfort level:   Encourage patient to monitor pain and request assistance   Assess pain using appropriate pain scale     Problem: Skin/Tissue Integrity  Goal: Absence of new skin breakdown  Description: 1.  Monitor for areas of redness and/or skin breakdown  2.  Assess vascular access sites hourly  3.  Every 4-6 hours minimum:  Change oxygen saturation probe site  4.  Every 4-6 hours:  If on nasal continuous positive airway pressure, respiratory therapy assess nares and determine need for appliance change or resting period.  Outcome: Progressing  Note: Skin assessment complete. No new signs of skin breakdown noted. Assistance provided with repositioning while in bed.     Problem: ABCDS Injury Assessment  Goal: Absence of physical injury  Outcome: Progressing  Flowsheets (Taken 5/21/2024 0449)  Absence of Physical Injury: Implement safety measures based on patient assessment     Problem: Safety - Adult  Goal: Free from fall injury  Outcome: Progressing  Flowsheets (Taken 5/21/2024 0449)  Free From Fall Injury: Instruct family/caregiver on patient safety

## 2024-05-21 NOTE — PLAN OF CARE
Problem: Discharge Planning  Goal: Discharge to home or other facility with appropriate resources  5/21/2024 0924 by Josefina Abraham RN  Outcome: Progressing  5/21/2024 0449 by Danielle Medel RN  Outcome: Progressing  Flowsheets (Taken 5/21/2024 0449)  Discharge to home or other facility with appropriate resources:   Identify barriers to discharge with patient and caregiver   Arrange for needed discharge resources and transportation as appropriate     Problem: Pain  Goal: Verbalizes/displays adequate comfort level or baseline comfort level  5/21/2024 0924 by Josefina Abraham RN  Outcome: Progressing  5/21/2024 0449 by Danielle Medel RN  Outcome: Progressing  Flowsheets (Taken 5/21/2024 0449)  Verbalizes/displays adequate comfort level or baseline comfort level:   Encourage patient to monitor pain and request assistance   Assess pain using appropriate pain scale     Problem: Skin/Tissue Integrity  Goal: Absence of new skin breakdown  Description: 1.  Monitor for areas of redness and/or skin breakdown  2.  Assess vascular access sites hourly  3.  Every 4-6 hours minimum:  Change oxygen saturation probe site  4.  Every 4-6 hours:  If on nasal continuous positive airway pressure, respiratory therapy assess nares and determine need for appliance change or resting period.  5/21/2024 0924 by Josefina Abraham RN  Outcome: Progressing  5/21/2024 0449 by Danielle Medel RN  Outcome: Progressing  Note: Skin assessment complete. No new signs of skin breakdown noted. Assistance provided with repositioning while in bed.     Problem: ABCDS Injury Assessment  Goal: Absence of physical injury  5/21/2024 0924 by Josefina Abraham RN  Outcome: Progressing  5/21/2024 0449 by Danielle Medel RN  Outcome: Progressing  Flowsheets (Taken 5/21/2024 0449)  Absence of Physical Injury: Implement safety measures based on patient assessment     Problem: Safety - Adult  Goal: Free from fall injury  5/21/2024 0924 by Jarad

## 2024-05-21 NOTE — PLAN OF CARE
Patient assisted to edge of bed with gait belt. C/o dizziness. BP WNL. Patient able to stand at bedside with mod assist for 1 minute. C/o increase in feeling of dizziness. BP 95/64. Patient assisted back to bed and repositioned. Dizziness resolved. Will continue to monitor.

## 2024-05-22 VITALS
BODY MASS INDEX: 27.7 KG/M2 | DIASTOLIC BLOOD PRESSURE: 69 MMHG | SYSTOLIC BLOOD PRESSURE: 128 MMHG | RESPIRATION RATE: 17 BRPM | HEIGHT: 60 IN | HEART RATE: 80 BPM | TEMPERATURE: 98.3 F | WEIGHT: 141.09 LBS | OXYGEN SATURATION: 96 %

## 2024-05-22 LAB
ALBUMIN SERPL-MCNC: 2.8 G/DL (ref 3.4–5)
ALBUMIN/GLOB SERPL: 1.2 {RATIO} (ref 1.1–2.2)
ALP SERPL-CCNC: 53 U/L (ref 40–129)
ALT SERPL-CCNC: <5 U/L (ref 10–40)
ANION GAP SERPL CALCULATED.3IONS-SCNC: 7 MMOL/L (ref 3–16)
AST SERPL-CCNC: 16 U/L (ref 15–37)
BASOPHILS # BLD: 0 K/UL (ref 0–0.2)
BASOPHILS NFR BLD: 0.2 %
BILIRUB SERPL-MCNC: 0.5 MG/DL (ref 0–1)
BUN SERPL-MCNC: 19 MG/DL (ref 7–20)
CALCIUM SERPL-MCNC: 8.1 MG/DL (ref 8.3–10.6)
CHLORIDE SERPL-SCNC: 104 MMOL/L (ref 99–110)
CO2 SERPL-SCNC: 26 MMOL/L (ref 21–32)
CREAT SERPL-MCNC: 0.6 MG/DL (ref 0.6–1.2)
DEPRECATED RDW RBC AUTO: 14.2 % (ref 12.4–15.4)
EOSINOPHIL # BLD: 0.1 K/UL (ref 0–0.6)
EOSINOPHIL NFR BLD: 1.4 %
GFR SERPLBLD CREATININE-BSD FMLA CKD-EPI: 83 ML/MIN/{1.73_M2}
GLUCOSE SERPL-MCNC: 119 MG/DL (ref 70–99)
HCT VFR BLD AUTO: 21 % (ref 36–48)
HGB BLD-MCNC: 7.1 G/DL (ref 12–16)
LYMPHOCYTES # BLD: 0.8 K/UL (ref 1–5.1)
LYMPHOCYTES NFR BLD: 10 %
MCH RBC QN AUTO: 30.3 PG (ref 26–34)
MCHC RBC AUTO-ENTMCNC: 33.7 G/DL (ref 31–36)
MCV RBC AUTO: 89.8 FL (ref 80–100)
MONOCYTES # BLD: 0.8 K/UL (ref 0–1.3)
MONOCYTES NFR BLD: 10.1 %
NEUTROPHILS # BLD: 6.3 K/UL (ref 1.7–7.7)
NEUTROPHILS NFR BLD: 78.3 %
PLATELET # BLD AUTO: 128 K/UL (ref 135–450)
PMV BLD AUTO: 8.9 FL (ref 5–10.5)
POTASSIUM SERPL-SCNC: 4 MMOL/L (ref 3.5–5.1)
PROT SERPL-MCNC: 5.1 G/DL (ref 6.4–8.2)
RBC # BLD AUTO: 2.34 M/UL (ref 4–5.2)
SODIUM SERPL-SCNC: 137 MMOL/L (ref 136–145)
WBC # BLD AUTO: 8.1 K/UL (ref 4–11)

## 2024-05-22 PROCEDURE — 80053 COMPREHEN METABOLIC PANEL: CPT

## 2024-05-22 PROCEDURE — 85025 COMPLETE CBC W/AUTO DIFF WBC: CPT

## 2024-05-22 PROCEDURE — 36415 COLL VENOUS BLD VENIPUNCTURE: CPT

## 2024-05-22 PROCEDURE — 94760 N-INVAS EAR/PLS OXIMETRY 1: CPT

## 2024-05-22 PROCEDURE — 97530 THERAPEUTIC ACTIVITIES: CPT

## 2024-05-22 PROCEDURE — 6360000002 HC RX W HCPCS: Performed by: INTERNAL MEDICINE

## 2024-05-22 PROCEDURE — 6370000000 HC RX 637 (ALT 250 FOR IP): Performed by: NURSE PRACTITIONER

## 2024-05-22 PROCEDURE — 2580000003 HC RX 258: Performed by: ORTHOPAEDIC SURGERY

## 2024-05-22 PROCEDURE — 97535 SELF CARE MNGMENT TRAINING: CPT

## 2024-05-22 PROCEDURE — 6370000000 HC RX 637 (ALT 250 FOR IP): Performed by: ORTHOPAEDIC SURGERY

## 2024-05-22 RX ORDER — ASPIRIN 81 MG/1
81 TABLET ORAL 2 TIMES DAILY
Qty: 60 TABLET | Refills: 0 | Status: SHIPPED | OUTPATIENT
Start: 2024-05-22 | End: 2024-06-21

## 2024-05-22 RX ORDER — ASPIRIN 81 MG/1
81 TABLET, CHEWABLE ORAL 2 TIMES DAILY
Status: DISCONTINUED | OUTPATIENT
Start: 2024-05-22 | End: 2024-05-22 | Stop reason: HOSPADM

## 2024-05-22 RX ORDER — ASPIRIN 81 MG/1
81 TABLET ORAL 2 TIMES DAILY
Status: DISCONTINUED | OUTPATIENT
Start: 2024-05-22 | End: 2024-05-22

## 2024-05-22 RX ORDER — ASPIRIN 81 MG/1
81 TABLET ORAL DAILY
Status: DISCONTINUED | OUTPATIENT
Start: 2024-05-23 | End: 2024-05-22

## 2024-05-22 RX ORDER — OXYCODONE HYDROCHLORIDE 5 MG/1
5 TABLET ORAL EVERY 4 HOURS PRN
Qty: 18 TABLET | Refills: 0 | Status: SHIPPED | OUTPATIENT
Start: 2024-05-22 | End: 2024-05-25

## 2024-05-22 RX ORDER — ASPIRIN 81 MG/1
81 TABLET ORAL DAILY
Qty: 30 TABLET | Refills: 0
Start: 2024-05-22

## 2024-05-22 RX ADMIN — DULOXETINE HYDROCHLORIDE 20 MG: 20 CAPSULE, DELAYED RELEASE ORAL at 09:31

## 2024-05-22 RX ADMIN — SODIUM CHLORIDE, PRESERVATIVE FREE 10 ML: 5 INJECTION INTRAVENOUS at 09:32

## 2024-05-22 RX ADMIN — PANTOPRAZOLE SODIUM 40 MG: 40 TABLET, DELAYED RELEASE ORAL at 09:31

## 2024-05-22 RX ADMIN — AMLODIPINE BESYLATE 2.5 MG: 5 TABLET ORAL at 09:31

## 2024-05-22 RX ADMIN — ASPIRIN 81 MG: 81 TABLET, COATED ORAL at 09:31

## 2024-05-22 NOTE — PLAN OF CARE
Problem: Discharge Planning  Goal: Discharge to home or other facility with appropriate resources  5/22/2024 1506 by Patti Frank RN  Outcome: Completed  5/22/2024 0152 by Rachel Guzman RN  Outcome: Progressing     Problem: Pain  Goal: Verbalizes/displays adequate comfort level or baseline comfort level  5/22/2024 1506 by Patti Frank RN  Outcome: Completed  5/22/2024 0152 by Rachel Guzman RN  Outcome: Progressing     Problem: Skin/Tissue Integrity  Goal: Absence of new skin breakdown  Description: 1.  Monitor for areas of redness and/or skin breakdown  2.  Assess vascular access sites hourly  3.  Every 4-6 hours minimum:  Change oxygen saturation probe site  4.  Every 4-6 hours:  If on nasal continuous positive airway pressure, respiratory therapy assess nares and determine need for appliance change or resting period.  5/22/2024 1506 by Patti Frank RN  Outcome: Completed  5/22/2024 0152 by Rachel Guzman RN  Outcome: Progressing     Problem: ABCDS Injury Assessment  Goal: Absence of physical injury  5/22/2024 1506 by Patti Frank RN  Outcome: Completed  5/22/2024 0152 by Rachel Guzman RN  Outcome: Progressing     Problem: Safety - Adult  Goal: Free from fall injury  5/22/2024 1506 by Patti Frank RN  Outcome: Completed  5/22/2024 0152 by Rachel Guzman RN  Outcome: Progressing

## 2024-05-22 NOTE — PROGRESS NOTES
Physical Therapy  Initial Evaluation Attempt and Discharge    24    Name: Galina Robison   : 3/11/1929    MRN: [id number]    PT order noted. Per Dr. Kaufman, \"This is a 89-year-old female who sustained a left intertrochanteric femur fracture from a mechanical fall today.  Tentatively plan for left hip fixation in the OR tomorrow at about 11am\". Will need new orders post-operatively. PT signing off.    Electronically signed by Cosme Mae PT on 2024 at 6:57 AM    
Called report to Johnny Armstrong. IV removed. Belongings taken by family at bedside.  
Clinical Pharmacy Note  Subcutaneous Anticoagulant Adjustment     Enoxaparin has been adjusted to 30mg daily based on [facility] policy.    Recent Labs     05/19/24  0428 05/20/24  0647   CREATININE 0.6 1.0     Recent Labs     05/20/24  0647   HGB 7.9*   HCT 23.3*        Estimated Creatinine Clearance: 28 mL/min (based on SCr of 1 mg/dL).    Pharmacist Review of Appropriate Use and Automatic Dose Adjustment of Subcutaneous Anticoagulants (Adult)    The guidance below is to provide initial recommendations for dosing. If recommended dose does not align well with patient's current clinical picture, communications with the care team will occur to determine most appropriate medication and dose.    TABLE 1.  ENOXAPARIN ROUTINE PROPHYLAXIS DOSING (Medically ill, routine surgery)   Patient Weight (kg)     50.9 and below 51 - 100.9 101 - 150.9 151 - 174.9 175 or greater         Estimated CrCl  (ml/min) 30 or greater   30 mg SUBQ daily   40 mg SUBQ daily 30 mg SUBQ BID  40 mg SUBQ BID 60mg SUBQ BID      15-29 UFH 5000 units SUBQ BID   30 mg SUBQ daily 30 mg SUBQ daily 40 mg SUBQ daily   60 mg SUBQ daily      Less than 15 or Dialysis UFH 5000 units SUBQ BID   UFH 5000 units SUBQ TID UFH 7500 units SUBQ TID           Gian Marley [facility] 5/20/2024 1:59 PM    
[facility] Orthopedic Surgery   Progress Note      S/P :  SUBJECTIVE  in bed. Alert and oriented. Eating. . Pain is   described in left hip and with the intensity of moderate. Pain is described as aching.       OBJECTIVE              Physical                      VITALS:  BP (!) 116/58   Pulse 83   Temp 98.6 °F (37 °C) (Oral)   Resp 17   Ht 1.524 m (5')   Wt 65 kg (143 lb 4.8 oz)   SpO2 98%   BMI 27.99 kg/m²                     MUSCULOSKELETAL:  left foot NVI. Wiggles toes to command. Able to plantarflex and dorsiflex ankle Pedal pulses are palpable.                    NEUROLOGIC:                                  Sensory:  Touch:  Left Lower Extremity:  normal                                                 Surgical wound appears clean and dry left hip with Mepilex dressings    Data       CBC:   Lab Results   Component Value Date/Time    WBC 9.9 05/21/2024 04:19 AM    RBC 1.97 05/21/2024 04:19 AM    HGB 6.1 05/21/2024 04:19 AM    HCT 18.0 05/21/2024 04:19 AM    MCV 91.2 05/21/2024 04:19 AM    MCH 30.9 05/21/2024 04:19 AM    MCHC 33.9 05/21/2024 04:19 AM    RDW 13.6 05/21/2024 04:19 AM     05/21/2024 04:19 AM    MPV 9.1 05/21/2024 04:19 AM        WBC:    Lab Results   Component Value Date/Time    WBC 9.9 05/21/2024 04:19 AM        Hemoglobin/Hematocrit:    Lab Results   Component Value Date/Time    HGB 6.1 05/21/2024 04:19 AM    HCT 18.0 05/21/2024 04:19 AM        PT/INR:    Lab Results   Component Value Date/Time    PROTIME 10.7 05/28/2020 11:50 AM    INR 0.92 05/28/2020 11:50 AM              Current Inpatient Medications             Current Facility-Administered Medications: 0.9 % sodium chloride infusion, , IntraVENous, PRN  enoxaparin Sodium (LOVENOX) injection 30 mg, 30 mg, SubCUTAneous, Daily  morphine (PF) injection 2 mg, 2 mg, IntraVENous, Q3H PRN **OR** morphine (PF) injection 4 mg, 4 mg, IntraVENous, Q3H PRN  oxyCODONE (ROXICODONE) immediate release tablet 5 mg, 5 mg, Oral, Q4H PRN **OR** oxyCODONE 
[facility] Orthopedic Surgery   Progress Note      S/P :  SUBJECTIVE  in bed. Alert and oriented to self. Trying to call \"Sandor\" on phone. I assisted her with the phone number she had written down. . Pain is   described in left hip and with the intensity of mild. Pain is described as aching.       OBJECTIVE              Physical                      VITALS:  /69   Pulse (!) 125   Temp 98.4 °F (36.9 °C)   Resp 16   Ht 1.524 m (5')   Wt 62.6 kg (138 lb 0.1 oz)   SpO2 (!) 85% Comment: PLACED BACK ON 2LPM--92%  BMI 26.95 kg/m²                     MUSCULOSKELETAL:  left foot NVI. Wiggles toes to command. Able to plantarflex and dorsiflex ankle Pedal pulses are palpable.                    NEUROLOGIC:                                  Sensory:  Touch:  Left Lower Extremity:  normal                                                 Surgical wound appears clean and dry left hip with Mepilex dressings. Ice pack on.     Data       CBC:   Lab Results   Component Value Date/Time    WBC 12.3 05/20/2024 06:47 AM    RBC 2.54 05/20/2024 06:47 AM    HGB 7.9 05/20/2024 06:47 AM    HCT 23.3 05/20/2024 06:47 AM    MCV 91.8 05/20/2024 06:47 AM    MCH 31.1 05/20/2024 06:47 AM    MCHC 33.9 05/20/2024 06:47 AM    RDW 13.8 05/20/2024 06:47 AM     05/20/2024 06:47 AM    MPV 9.0 05/20/2024 06:47 AM        WBC:    Lab Results   Component Value Date/Time    WBC 12.3 05/20/2024 06:47 AM        Hemoglobin/Hematocrit:    Lab Results   Component Value Date/Time    HGB 7.9 05/20/2024 06:47 AM    HCT 23.3 05/20/2024 06:47 AM        PT/INR:    Lab Results   Component Value Date/Time    PROTIME 10.7 05/28/2020 11:50 AM    INR 0.92 05/28/2020 11:50 AM              Current Inpatient Medications             Current Facility-Administered Medications: morphine (PF) injection 2 mg, 2 mg, IntraVENous, Q3H PRN **OR** morphine (PF) injection 4 mg, 4 mg, IntraVENous, Q3H PRN  oxyCODONE (ROXICODONE) immediate release tablet 5 mg, 5 mg, Oral, Q4H PRN 
Medication Reconciliation    List of medications patient is currently taking is complete.     Source of information: 1. Conversation with patient and patient's caregiver at bedside                                      2. EPIC records         Notes regarding home medications:   1. Patient's son reports she took all of her morning medications.        Leila Burton, Pharmacy Intern   5/18/2024  4:22 PM   
Occupational Therapy  Galina Robison  [id number]  [id number]    Attempted to see for OT follow up session this am. Patient is receiving blood at this time. Will defer OT and attempt to see later as schedule allows. If discharged prior to next OT session please see last daily note for discharge status.    Electronically signed by ISAIAH Gonzalez on 5/21/2024 at 11:46 AM    OTR was consulted with this patients treatment/intervention plan.     
Perfect serve sent to MD regarding hgb 6.1, hct 18. Awaiting response.  
Physical Therapy  Attempt Note    Pt. hgb levels 6.1, with pt. actively receiving RBC. Therapies held this date until medically appropriate.      Светлана Garcia, SPT    I attest that I was present for and made a skilled & mindful clinical judgement during the evaluation and/or treatment of this patient on 5/21/2024   Electronically signed by MARJAN YU, PT [id number] [id number])  on 5/21/2024 at 12:26 PM    
Physical Therapy  Facility/Department: [address] ORTHOPEDICS  Physical Therapy  Co Treat with OT  Daily Treatment Note    This note serves as patient discharge summary if pt discharges prior to next PT visit      Name: Galina Robison  : 3/11/1929  MRN: [id number]  Date of Service: 2024    Discharge Recommendations:  Subacute/Skilled Nursing Facility, Patient would benefit from continued therapy after discharge   PT Equipment Recommendations  Equipment Needed: No (defer to next LOC)    Galina Robison scored a 8/24 on the AM-PAC short mobility form. Current research shows that an AM-PAC score of 17 or less is typically not associated with a discharge to the patient's home setting. Based on the patient's AM-PAC score and their current functional mobility deficits, it is recommended that the patient have 3-5 sessions per week of Physical Therapy at d/c to increase the patient's independence.  Please see assessment section for further patient specific details.       Patient Diagnosis(es): The primary encounter diagnosis was Closed displaced intertrochanteric fracture of left femur, initial encounter ([facility]). A diagnosis of Fall, initial encounter was also pertinent to this visit.  Past Medical History:  has a past medical history of Arthritis, Blood circulation, collateral, Cataract, Clostridium difficile infection, GERD (gastroesophageal reflux disease), Glaucoma, Hyperlipidemia, Hypertension, Macular degeneration, and UTI (urinary tract infection).  Past Surgical History:  has a past surgical history that includes Cataract removal (Bilateral, ); Total knee arthroplasty; Hysterectomy (); Varicose vein surgery ( leticia,  left.); Colonoscopy; hernia repair (Left, 2018); hernia repair (Right, 2020); Partial hysterectomy; and hip surgery (Left, 2024).    Assessment   Body Structures, Functions, Activity Limitations Requiring Skilled Therapeutic Intervention: Decreased functional 
Pt AO x 3-4, has short term memory loss, pt stated that she had episodes where she wake up from sleep and had to orient herself for about 30mins that she is not at home, pt aware that she is in the hospital and she had surgery yesterday. Pt has no complaints of pain, cooperative in turns and repositioning, leticia heels elevated, SCDs in place.Pt only had <200mL urine output so far this shift, perfectserve sent to oncall hospitalist and ordered fluids 100ml/hr. Haider still in place and draining. Encouraged pt to call for needs, fall and standard safety precautions in place.  
Pt arrived to PACU from OR. Pt asleep on room air, awakens easily. Left hip dressing C/D/I. Ice pack applied. +pulses noted. Pt denies c/o pain at this time.   
Pt in bed, AO x 4, awake, assessment done, VSS. Scheduled meds given, tolerated PO well. Pt reminded of NPO status after midnight in preparation for OR tomorrow, pt agreeable. Quirino heels elevated, pt positioned to comfort, c/o pain and given PRN pain med as ordered. Pt encouraged to call for needs, fall and safety precautions in place. Call light and belonging within reach.  
Report called to DHIRAJ Don on 3W. Pt confused, knows . Dr. Hicks aware. Ok to transfer back to room 3129.  
Tele monitor showed 20 sec of PAT. Perfect serve sent to NP. Awaiting response.  
The patients OARRS report has been reviewed online and any prescribing of pain related medications is based on our findings.The patient has been issued narcotics to safely reduce postoperative pain and promote tolerance with physical therapies and ADL's. Reduction in dosing will be addressed with the next narcotic refill request. Dosing is adjusted for patients with history of chronic pain disorders.    
status;Decreased high-level IADLs;Decreased endurance;Decreased strength  Assessment: Discussed with OTR am pac score is 13 which indicates need for continued skilled OT to increase IND and decrease caregiver burden. Max A of 2 for supine to sit with HOB elevated and side rail. Max A of 2 from elevated height of bed to shanita stedy to commode to shanita stedy to recliner chair. Use of shanita stedy for safe mobility from bed to bathroom to recliner chair. Seated on edge of bed with SBA. Stood very briefly with Max A of 1-2 for toileting. Dependent for lower body dressing. Patient is unable to return home at this time due assist level and lift equipment requirements. Patiient would benefit from low to mod therapy to maximize IND.  REQUIRES OT FOLLOW-UP: Yes  Activity Tolerance  Activity Tolerance: Patient limited by pain        Plan   Occupational Therapy Plan  Times Per Week: 3-5x  Current Treatment Recommendations: Strengthening, Balance training, Functional mobility training, Endurance training, Safety education & training, Equipment evaluation, education, & procurement, Patient/Caregiver education & training, Self-Care / ADL, Pain management     Restrictions  Restrictions/Precautions  Restrictions/Precautions: Weight Bearing, Fall Risk  Lower Extremity Weight Bearing Restrictions  Left Lower Extremity Weight Bearing: Weight Bearing As Tolerated  Position Activity Restriction  Other position/activity restrictions: 2L O2 via NC;    Subjective   General  Chart Reviewed: Yes  Patient assessed for rehabilitation services?: Yes  Additional Pertinent Hx: Per H&P note on 5/18/24, \"Patient 89-year-old female with past medical history of macular degeneration chronic GERD hypertension hyperlipidemia presented to the hospital from home after having a fall with left-sided hip pain to the point that her left leg is shortened with external rotation along with 8/10 pain.  Imaging confirmed a left-sided hip fracture intertrochanteric.  
5/19/2024  Radiology exam is complete. No Radiologist dictation. Please follow up with ordering provider.     XR LUMBAR SPINE (2-3 VIEWS)    Result Date: 5/18/2024  EXAMINATION: 3 XRAY VIEWS OF THE LUMBAR SPINE 5/18/2024 3:41 pm COMPARISON: None. HISTORY: ORDERING SYSTEM PROVIDED HISTORY: fall TECHNOLOGIST PROVIDED HISTORY: Reason for exam:->fall Reason for Exam: fall, left hip pain FINDINGS: There are osteophytes noted in the the lower thoracic and spine.  There are no fractures noted.  There is mild spondylolisthesis of L4 on L5 and L5 on S1.  This is appears to be related to osteoarthritis of facet joints.     1. No acute fracture or dislocation. 2. Degenerative changes. 3. Mild spondylolisthesis of L4 on L5 and L5 on S1.     XR HIP 3-4 VW W PELVIS BILATERAL    Result Date: 5/18/2024  EXAMINATION: ONE XRAY VIEW OF THE PELVIS AND TWO XRAY VIEWS OF EACH OF THE BILATERAL HIPS 5/18/2024 3:41 pm COMPARISON: None. HISTORY: ORDERING SYSTEM PROVIDED HISTORY: fall TECHNOLOGIST PROVIDED HISTORY: Reason for exam:->fall Reason for Exam: fall, left hip pain FINDINGS: There is a displaced comminuted left intertrochanteric fracture, with varus angulation.  There are fragments of the greater and lesser trochanter. Pelvis and right hip appear unremarkable.  There is evidence of osteoarthritis of lower lumbar facet joints.     Displaced comminuted left intertrochanteric fracture with varus angulation.     XR CHEST PORTABLE    Result Date: 5/18/2024  EXAMINATION: ONE XRAY VIEW OF THE CHEST 5/18/2024 3:41 pm COMPARISON: None. HISTORY: ORDERING SYSTEM PROVIDED HISTORY: fall TECHNOLOGIST PROVIDED HISTORY: Reason for exam:->fall Reason for Exam: fall, left hip fracture FINDINGS: The lungs are without acute focal process.  There is no effusion or pneumothorax. The cardiomediastinal silhouette is without acute process. The osseous structures are without acute process.     No acute process.       CBC:   Recent Labs     05/18/24  1602 
two or more falls in the past year or any fall with injury in the past year?: Yes (this admission)  ADL Assistance: Independent  Homemaking Assistance: Needs assistance (cleaning assistance 1X/month. manages own meds, son manages finances, pt ooes laundry, son assists with groceries)  Ambulation Assistance: Independent (no AD)  Transfer Assistance: Independent  Active : No  Patient's  Info: friends drive  Education: completed high school  Occupation: Retired  Additional Comments: Sleeps in regular bed. Manages own meds via Dose Packs.  Vision/Hearing  Vision  Vision: Impaired  Vision Exceptions: Wears glasses at all times  Hearing  Hearing: Exceptions to WFL  Hearing Exceptions: Bilateral hearing aid    Cognition   Orientation  Overall Orientation Status: Within Functional Limits  Orientation Level: Oriented X4  Cognition  Overall Cognitive Status: WFL     Objective   75/45, 92%, pulse 92  89/48 100%, pulse 93  (Seated EOB)     Observation/Palpation  Body Mechanics: Min thoracic kyphosis. Demonstrated min trunk control in seated this date.  Edema: Ice donned end of session L hip.        AROM RLE (degrees)  RLE General AROM: Ankle ROM WFL leticia. Unable to demonstrate active knee or hip ROM leticia d/t \"feeling weak.\"  AROM LLE (degrees)  LLE General AROM: Ankle ROM WFL leticia. Unable to demonstrate active knee or hip ROM leticia d/t \"feeling weak.\"  Strength RLE  Comment: Ankle strength WFL for AROM. Knee/hip strength unable to move through ROM without max A.  Strength LLE  Comment: Ankle strength WFL for AROM. Knee/hip strength unable to move through ROM without max A. MMT's deferred to later date d/t desat BP.  Strength RUE  Comment: Ankle strength WFL for AROM. Knee/hip strength unable to move through ROM without max A. MMT's deferred to later date d/t desat BP.  Bed mobility  Rolling to Right: Moderate assistance  Supine to Sit: Moderate assistance  Sit to Supine: 2 Person assistance;Maximum assistance  Scooting: 
Clinical Factors: totalA to don bilateral footie socks long sitting in bed  Functional Mobility: Unable to assess (Comment)  Functional Mobility Skilled Clinical Factors: unable to assess this date as pt reporting feeling dizzy/lightheaded sitting EOB and BP 75/45 and repositioned back to supine in be  Additional Comments: Anticipate pt to require up to totalA toileting and LB bathing, modA seated UB bathing/dressing and seated grooming based on strength, balance, endurance and ROM observed this date.  Skin Care: Soap and water     Activity Tolerance  Activity Tolerance: Treatment limited secondary to medical complications  Activity Tolerance Comments: Limited this date by onset dizziness, low BP at EOB.  Bed mobility  Rolling to Right: Moderate assistance  Supine to Sit: Moderate assistance  Sit to Supine: 2 Person assistance;Maximum assistance  Scooting: Maximal assistance;2 Person assistance  Bed Mobility Comments: HOB elevated. Use of bedrails throughout. Seated balance fluccuated from CGA to Mod A with onset of dizziness.  Transfers  Transfer Comments: unable to assess this date as pt felt lightheaded/dizzy sitting EOB and BP 75/45 and repositioned back to supine in bed  Vision  Vision: Impaired  Vision Exceptions: Wears glasses at all times  Hearing  Hearing: Exceptions to WFL  Hearing Exceptions: Bilateral hearing aid  Cognition  Overall Cognitive Status: WFL  Orientation  Overall Orientation Status: Within Functional Limits  Orientation Level: Oriented X4               Static Sitting Balance Exercises: variable assist sitting EOB x10 minutes from modA to CGA  with pt requiring cues throughout for hand placement due to pt with forward lean and R lean with difficulty holding self upright desppite cues  Education Given To: Patient  Education Provided: Role of Therapy;Plan of Care  Education Method: Verbal;Demonstration  Barriers to Learning: Hearing  Education Outcome: Verbalized understanding;Continued 
Growth after 4 days of incubation. 06/14/2021 03:51 PM     Organism:   Lab Results   Component Value Date/Time    ORG Escherichia coli 05/03/2021 01:22 PM         Electronically signed by Fercho Thompson MD on 5/21/2024 at 9:21 AM  Comment: Please note this report has been produced using speech recognition software and may contain errors related to that system including errors in grammar, punctuation, and spelling, as well as words and phrases that may be inappropriate. If there are any questions or concerns, please feel free to contact the dictating provider for clarification.     
Ref Range    Sodium 140 136 - 145 mmol/L    Potassium 4.9 3.5 - 5.1 mmol/L    Chloride 106 99 - 110 mmol/L    CO2 29 21 - 32 mmol/L    Anion Gap 5 3 - 16    Glucose 122 (H) 70 - 99 mg/dL    BUN 17 7 - 20 mg/dL    Creatinine 0.6 0.6 - 1.2 mg/dL    Est, Glom Filt Rate 83 >60    Calcium 8.9 8.3 - 10.6 mg/dL   Magnesium    Collection Time: 05/19/24  4:28 AM   Result Value Ref Range    Magnesium 2.30 1.80 - 2.40 mg/dL   Phosphorus    Collection Time: 05/19/24  4:28 AM   Result Value Ref Range    Phosphorus 3.9 2.5 - 4.9 mg/dL        Imaging/Diagnostics Last 24 Hours   XR LUMBAR SPINE (2-3 VIEWS)    Result Date: 5/18/2024  EXAMINATION: 3 XRAY VIEWS OF THE LUMBAR SPINE 5/18/2024 3:41 pm COMPARISON: None. HISTORY: ORDERING SYSTEM PROVIDED HISTORY: fall TECHNOLOGIST PROVIDED HISTORY: Reason for exam:->fall Reason for Exam: fall, left hip pain FINDINGS: There are osteophytes noted in the the lower thoracic and spine.  There are no fractures noted.  There is mild spondylolisthesis of L4 on L5 and L5 on S1.  This is appears to be related to osteoarthritis of facet joints.     1. No acute fracture or dislocation. 2. Degenerative changes. 3. Mild spondylolisthesis of L4 on L5 and L5 on S1.     XR HIP 3-4 VW W PELVIS BILATERAL    Result Date: 5/18/2024  EXAMINATION: ONE XRAY VIEW OF THE PELVIS AND TWO XRAY VIEWS OF EACH OF THE BILATERAL HIPS 5/18/2024 3:41 pm COMPARISON: None. HISTORY: ORDERING SYSTEM PROVIDED HISTORY: fall TECHNOLOGIST PROVIDED HISTORY: Reason for exam:->fall Reason for Exam: fall, left hip pain FINDINGS: There is a displaced comminuted left intertrochanteric fracture, with varus angulation.  There are fragments of the greater and lesser trochanter. Pelvis and right hip appear unremarkable.  There is evidence of osteoarthritis of lower lumbar facet joints.     Displaced comminuted left intertrochanteric fracture with varus angulation.     XR CHEST PORTABLE    Result Date: 5/18/2024  EXAMINATION: ONE XRAY VIEW OF

## 2024-05-22 NOTE — PLAN OF CARE
Ortho POC:    No change in orthopaedic plan at this time.    Per ortho, patient is to be on asa 81 mg BID x 30 days for DVT prophylaxis. Order and discharge meds clarified.    Edson Meadows, APRN - CNP

## 2024-05-22 NOTE — DISCHARGE SUMMARY
Hospital Medicine Discharge Summary    Patient ID: Galina Robison      Patient's PCP: Robbie Neff MD    Admit Date: 5/18/2024     Discharge Date:   05/22/2024    Admitting Provider: No admitting provider for patient encounter.     Discharge Provider: Fercho Thompson MD     Discharge Diagnoses:       Active Hospital Problems    Diagnosis     Closed displaced intertrochanteric fracture of left femur (HCC) [S72.142A]        The patient was seen and examined on day of discharge and this discharge summary is in conjunction with any daily progress note from day of discharge.    Hospital Course:       From HPI:\"Patient 89-year-old female with past medical history of macular degeneration chronic GERD hypertension hyperlipidemia presented to the hospital from home after having a fall with left-sided hip pain to the point that her left leg is shortened with external rotation along with 8/10 pain. Imaging confirmed a left-sided hip fracture intertrochanteric. Denies any nausea vomiting diarrhea constipation dizziness lightheadedness does have leg pain 8/10 in intensity received Dilaudid in the ED vital signs are stable most of the workup is negative. Orthopedic to be consulted. \"         Left-sided hip intertrochanteric fracture status post nail postop day 3  Acute blood loss anemia, hemoglobin dropped yesterday transfused hemodynamically stable for now discussed with Ortho okay to discharge  Essential hypertension p.o. medication  Chronic GERD no acute issues  Mood disorder on Cymbalta             Physical Exam Performed:     BP (!) 124/54   Pulse 84   Temp 97.9 °F (36.6 °C) (Oral)   Resp 16   Ht 1.524 m (5')   Wt 64 kg (141 lb 1.5 oz)   SpO2 97%   BMI 27.56 kg/m²       General appearance:  No apparent distress  HEENT:  Normal cephalic  Respiratory:  Normal respiratory effort. Clear to auscultation, bilaterally without Rales/Wheezes/Rhonchi.  Cardiovascular:  Regular rate and rhythm with normal S1/S2

## 2024-05-22 NOTE — CARE COORDINATION
05/22/24 1423   IMM Letter   IMM Letter given to Patient/Family/Significant other/Guardian/POA/by: 2nd IMM explained to pt and copy provided per JUAN ALBERTO Tuttle RN   IMM Letter date given: 05/22/24   IMM Letter time given: 1423     Education provided to patient. Patient reported no questions and verbalized understanding. Patient made aware that he/she has 4 hours prior to discharging from hospital to decide if he/she wishes to pursue the Medicare appeal process.      Electronically signed by Christal Tuttle on 5/22/2024 at 2:23 PM  #[phone]  
DISCHARGE SUMMARY     DATE OF DISCHARGE: 5/22/24    DISCHARGE DESTINATION: [facility]      FACILITY    Level of Care: Skilled  Discharging to Facility/ Agency   Name: [facility]  Address:  [address], [place] [address]   Phone:  [phone]  Fax:  [phone]  Precert Obtained: yes    Hens Completed: yes    PASARR: N/A    Notified: RN, Family, and Facility/Agency  Notified vannessa khan #[phone]    TRANSPORTATION: Stretcher    Company Name: [facility]     Time: 4:00PM    Phone Number: [phone]    NEW DME ORDERED: N/A    significant  #[phone]  
OUR COMMUNITY  PRE-CERT REQUEST SUBMITTED VIA ACCESS PORTAL    REQUESTED SNF:  [facility]     ANTICIPATED ADMIT DATE TO SNF:  05/21/2024      OUR [facility] #:  [id number]     Electronically signed by Bhargavi Whitman, Case Management Assistant Bhargavi Whitman on 5/21/2024 at 10:05 AM             
Our Community authorization received via portal:      Payor approval ID:  [id number]     Our Community Approval ID:       [facility] Location:  [facility]    Dates Approved:  05/22 THRU 05/24/2024      NRD:  05/24/2024    Electronically signed by Bhargavi Whitman, Case Management Assistant Bhargavi Whitman on 5/22/2024 at 1:40 PM       
Care services:  (P) None    ADLS  Prior functional level: Independent in ADLs/IADLs  Current functional level: Assistance with the following:, Shopping, Mobility, Bathing, Dressing, Housework, Cooking, Toileting    PT AM-PAC: 9 /24  OT AM-PAC: 12 /24    Family can provide assistance at DC: No  Would you like Case Management to discuss the discharge plan with any other family members/significant others, and if so, who? Yes (son Kenneth)  Plans to Return to Present Housing: No  Other Identified Issues/Barriers to RETURNING to current housing: mobility  Potential Assistance needed at discharge: (P) Skilled Nursing Facility            Potential DME:  none  Patient expects to discharge to: (P) Skilled nursing facility  Plan for transportation at discharge:  medical transport    Financial    Payor: [facility] MEDICARE / Plan: [facility] MEDICARE COMPLETE / Product Type: *No Product type* /     Does insurance require precert for SNF: Yes    Potential assistance Purchasing Medications: (P) No  Meds-to-Beds request: No      [facility] - [place], [place] - [facility] - P [phone] - F [phone]  [facility] [address].  [facility] [address]  Phone: [phone] Fax: [phone]      Notes:    Factors facilitating achievement of predicted outcomes: Family support, Motivated, Cooperative, and Pleasant    Barriers to discharge: Pain    Additional Case Management Notes: met with patient at bedside to discuss dc needs  Pt lives alone in her own home - it is a 2 level home- there is a 1/2 bath on the main level and bedroom and full bath are upstairs  Laundry is in the basement   We discussed PT/OT recommendation for continued therapy at dc  She is interested in [facility]   Referral made per EPIC     Spoke w/ Marla w/ [facility] #[phone]-- she can accept  SNF will need precert    Spoke w/ son Kenneth # [phone]- he is agreeable to plan     The Plan for Transition of Care is related to the following treatment goals of Closed

## 2024-05-22 NOTE — CONSENT
Informed Consent for Blood Component Transfusion Note    I have discussed with the patient the rationale for blood component transfusion; its benefits in treating or preventing fatigue, organ damage, or death; and its risk which includes mild transfusion reactions, rare risk of blood borne infection, or more serious but rare reactions. I have discussed the alternatives to transfusion, including the risk and consequences of not receiving transfusion. The patient had an opportunity to ask questions and had agreed to proceed with transfusion of blood components.    Electronically signed by Fercho Thompson MD on 5/22/24 at 10:17 AM EDT

## 2024-06-06 ENCOUNTER — OFFICE VISIT (OUTPATIENT)
Dept: ORTHOPEDIC SURGERY | Age: 89
End: 2024-06-06

## 2024-06-06 VITALS — WEIGHT: 141 LBS | BODY MASS INDEX: 27.68 KG/M2 | RESPIRATION RATE: 16 BRPM | HEIGHT: 60 IN

## 2024-06-06 DIAGNOSIS — S72.142A CLOSED DISPLACED INTERTROCHANTERIC FRACTURE OF LEFT FEMUR, INITIAL ENCOUNTER (HCC): Primary | ICD-10-CM

## 2024-06-06 PROCEDURE — 99024 POSTOP FOLLOW-UP VISIT: CPT | Performed by: ORTHOPAEDIC SURGERY

## 2024-06-06 NOTE — PROGRESS NOTES
[facility]  Office Visit    Chief Complaint: Follow-up for left intertrochanteric femur fracture    HPI:  Galina Robison is a 89 y.o. who is here in follow-up of left intertrochanteric femur fracture.  She underwent cephalomedullary nail on May 19, 2024.  She is staying at a skilled nursing facility.  She walks with a walker while there.  She is here in wheelchair today.  Pain is controlled.  She has difficulty walking long distances.  She does feel that she is slowly improving.      Past Medical History:   Diagnosis Date    Arthritis     Blood circulation, collateral     Cataract     Clostridium difficile infection 1/28/14    GERD (gastroesophageal reflux disease)     Glaucoma     Hyperlipidemia     no meds    Hypertension 2017    essential=pt denies    Macular degeneration     UTI (urinary tract infection)         ROS:  Constitutional: denies fever, chills, weight loss  MSK: denies pain in other joints, muscle aches  Neurological: denies numbness, tingling, weakness    Exam:  Resp 16   Ht 1.524 m (5')   Wt 64 kg (141 lb)   BMI 27.54 kg/m²      Appearance: sitting in exam room chair, appears to be in no acute distress, awake and alert  Resp: unlabored breathing on room air  Skin: warm, dry and intact with out erythema or significant increased temperature  Neuro: grossly intact both lower extremities. Intact sensation to light touch. Motor exam 4+ to 5/5 in all major motor groups.  LLE: Incisions are healing as expected.  She demonstrates weak active hip flexion.  Motor function and sensation intact distally.    Imaging:  AP pelvis, AP and frog-leg lateral views of the left hip were performed and interpreted today.  Significant for intertrochanteric femur fracture spanned by cephalomedullary nail.  Alignment is unchanged compared to intraoperative fluoroscopic images.    Assessment:  Left intertrochanteric femur fracture status post cephalomedullary nail    Plan:  She is

## 2024-07-11 ENCOUNTER — OFFICE VISIT (OUTPATIENT)
Dept: ORTHOPEDIC SURGERY | Age: 89
End: 2024-07-11

## 2024-07-11 VITALS — WEIGHT: 141 LBS | HEIGHT: 60 IN | BODY MASS INDEX: 27.68 KG/M2

## 2024-07-11 DIAGNOSIS — S72.142A CLOSED DISPLACED INTERTROCHANTERIC FRACTURE OF LEFT FEMUR, INITIAL ENCOUNTER (HCC): Primary | ICD-10-CM

## 2024-07-11 PROCEDURE — 99024 POSTOP FOLLOW-UP VISIT: CPT | Performed by: PHYSICIAN ASSISTANT

## 2024-07-13 NOTE — PROGRESS NOTES
This dictation was done with Dragon dictation and may contain mechanical errors related to translation.  Height 1.524 m (5'), weight 64 kg (141 lb), not currently breastfeeding.    This is a very pleasant 89-year-old female who is here after having surgery with Dr. Kaufman for hip nailing for femur fracture on 5/19/2024.  [facility] she states she has 0-10 pain.  She was taking a while to heal but recently she has been able to do a leg lift and is getting around on her walker and doing physical therapy I sent her for an AP pelvis and 2 view left femur    At this visit the X-rays, presenting symptoms, and chief complaint were presented to Sony Kaufman MD.  He then evaluated the patient.  He spent several minutes discussing face to face with the patient the clinical diagnosis and medical course options,from conservative to aggressive including risks and benefits.      On examination she has a well-healed incision she is neurologically intact to her left foot she can actively raise her left leg and she is able to stand comfortably.  At her age she is doing well and the hope is that she can continue to get around with the assistance of a walker and she will follow-up with us on a as needed basis

## 2024-07-23 NOTE — PROCEDURES
[facility]          [address] [place] [address]                       ELECTROENCEPHALOGRAM REPORT      PATIENT NAME: ALVARO GEE           : 1929  MED REC NO: [id number]                      ROOM: [place] [address]  ACCOUNT NO: [id number]                       ADMIT DATE: 2024  PROVIDER: Vishal Cohen DO      DATE OF EE2024    REFERRING PHYSICIAN:  Leila Wiley NP    REASON FOR STUDY:  Altered mental status and confusion.    BRIEF HISTORY AND NEUROLOGIC FINDINGS:  The patient is a 89-year-old female, being evaluated for reported altered mental status and confusion.    MEDICATIONS:  The patient's centrally acting medications listed include Cymbalta.    EEG FINDINGS:  This is a 20-channel digital EEG performed utilizing bipolar and referential montages.  Wakefulness, drowsiness, and stage 2 sleep were obtained during the recording.  During maximal wakefulness, there was a moderate voltage, symmetric, fairly well-regulated and reactive 8 to 9 hertz posterior background rhythm.  The anterior background consists of low voltage fast frequencies.  Drowsiness was manifested by attenuation of the waking background rhythm.  Sleep was manifested by sleep spindles and K-complexes.    Photic stimulation was performed at various flash frequencies and evoked a symmetric posterior driving response.  Hyperventilation exercise was not performed due to the patient's age.    A 1-channel EKG rhythm strip was reviewed and showed heart rates typically around 60 beats per minute.    EEG DIAGNOSIS:  Normal, awake, and asleep electroencephalogram.    CLINICAL INTERPRETATION:  No definite epileptiform activity or evidence for focal cerebral dysfunction was present during this recording.  If seizures remain a clinical concern, then a repeat EEG may be of further benefit.  Clinical correlation is advised.          VISHAL COHEN DO      D:  2024 11:41:57     T:  
I am here to get sutures out of my right hand

## 2024-09-17 NOTE — PROGRESS NOTES
c/o pain.  Disappointed that she is not going home today.         Social/Functional History  Social/Functional History  Lives With: Alone  Type of Home: House  Home Layout: Two level, Bed/Bath upstairs, 1/2 bath on main level, Laundry in basement  Home Access: Stairs to enter with rails  Entrance Stairs - Number of Steps: 1+1  Entrance Stairs - Rails: Right  Bathroom Shower/Tub: Walk-in shower  Bathroom Toilet: Standard  Bathroom Equipment: Grab bars in shower, Shower chair, Grab bars around toilet  Home Equipment: Walker, rolling  Has the patient had two or more falls in the past year or any fall with injury in the past year?: No  ADL Assistance: Independent  Homemaking Assistance: Independent (reports cleaning assistance 1x/month; manages own meds, but son manages finances)  Ambulation Assistance: Independent  Transfer Assistance: Independent  Vocational: Retired  Active : No  Education: completed high school  Occupation: Retired       Cognition   Orientation  Overall Orientation Status: Within Functional Limits  Cognition  Overall Cognitive Status: WFL     Objective         Bed mobility  Supine to Sit: Supervision  Sit to Supine: Unable to assess (up in chair at end of session)    Transfers  Sit to Stand: Supervision;Contact guard assistance (CGA from low couch only and took 2 attempts to stand from couch)  Stand to Sit: Supervision    Ambulation  Surface: Level tile  Device: No Device  Assistance: Stand by assistance;Supervision  Quality of Gait: slow and slightly guarded but no LOB and no c/o fatigue  Gait Deviations: Slow Daniela;Decreased head and trunk rotation  Distance: 15'x2, approx 400'  More Ambulation?: No  Stairs/Curb  Stairs?: No     Balance  Posture: Good  Sitting - Static: Good  Sitting - Dynamic: Good  Standing - Static: Good  Standing - Dynamic: Good  Comments: pt used restroom during session with supervision; pt stood without UE support with supervision for standing balance for several  The patient is a 62y Female complaining of

## 2024-10-14 ENCOUNTER — CARE COORDINATION (OUTPATIENT)
Dept: CARE COORDINATION | Age: 89
End: 2024-10-14

## 2024-10-14 NOTE — CARE COORDINATION
Chart accessed for ACM outreach;  Pt currently at [facility], confirmed w/staff.  ACM signing off.

## (undated) DEVICE — SUTURE MONOCRYL STRATAFIX SPRL SZ 3-0 L12IN ABSRB UD FS-1 L30X30CM SXMP2B410

## (undated) DEVICE — GOWN,AURORA,NONREINF,RAGLAN,XXL,STERILE: Brand: MEDLINE

## (undated) DEVICE — GLOVE SURG SZ 8 L12IN FNGR THK79MIL GRN LTX FREE

## (undated) DEVICE — SOLUTION IRRIG 1000ML 0.9% SOD CHL USP POUR PLAS BTL

## (undated) DEVICE — ELECTRODE PT RET AD L9FT HI MOIST COND ADH HYDRGEL CORDED

## (undated) DEVICE — CHLORAPREP 26ML ORANGE

## (undated) DEVICE — HIP PINNING: Brand: MEDLINE INDUSTRIES, INC.

## (undated) DEVICE — SUTURE VICRYL + SZ 2-0 L18IN ABSRB UD CT1 L36MM 1/2 CIR VCP839D

## (undated) DEVICE — C-ARMOR C-ARM EQUIPMENT COVERS CLEAR STERILE UNIVERSAL FIT 12 PER CASE: Brand: C-ARMOR

## (undated) DEVICE — GLOVE ORANGE PI 7 1/2   MSG9075

## (undated) DEVICE — Z DUP USE 2257490 ADHESIVE SKIN CLSRE 036ML TPCL 2CTL CNCRLTE HIGH VSCSTY DRMB

## (undated) DEVICE — SUTURE VICRYL + SZ 2-0 L27IN ABSRB CLR CT-1 1/2 CIR TAPERCUT VCP259H

## (undated) DEVICE — SCREW BNE L95MM DIA10.35MM PROX FEM G TI CANN FOR TFN ADV: Type: IMPLANTABLE DEVICE | Site: HIP | Status: NON-FUNCTIONAL

## (undated) DEVICE — MINOR SET UP PK

## (undated) DEVICE — SUTURE VCRL SZ 3-0 L27IN ABSRB UD L26MM SH 1/2 CIR J416H

## (undated) DEVICE — ROD RMR L950MM DIA3MM W/ STR BALL TIP

## (undated) DEVICE — SUTURE VCRL SZ 4-0 L18IN ABSRB UD L19MM PS-2 3/8 CIR PRIM J496H

## (undated) DEVICE — COVER LT HNDL BLU PLAS

## (undated) DEVICE — SUTURE DEXON/VICRYL/POLYSORB/POLYSYN VC839 CT-1/GS-21/T-12 VC839D

## (undated) DEVICE — BIT DRL L500MM DIA6X9MM CANN STP L QUIK CPL FOR DH DC TFN

## (undated) DEVICE — DECANTER BTL 6IN: Brand: MEDLINE INDUSTRIES, INC.

## (undated) DEVICE — TUBING, SUCTION, 1/4" X 12', STRAIGHT: Brand: MEDLINE

## (undated) DEVICE — DRESSING ALG W4XL8IN AG FOAM SUPERABSORBENT SIL ANTIMIC

## (undated) DEVICE — C-ARM PACK: Brand: C-ARM COVER

## (undated) DEVICE — BIT DRL L330MM DIA4.2MM CALIB L100MM ST 3 FLUT QUIK CPL FOR

## (undated) DEVICE — MERCY HEALTH WEST TURNOVER: Brand: MEDLINE INDUSTRIES, INC.

## (undated) DEVICE — SUTURE VICRYL + SZ 0 L18IN ABSRB UD L36MM CT-1 1/2 CIR VCP840D

## (undated) DEVICE — SUTURE PDS II SZ 2-0 L27IN ABSRB VLT L26MM CT-2 1/2 CIR Z333H

## (undated) DEVICE — GLOVE,SURG,SENSICARE SLT,LF,PF,8.5: Brand: MEDLINE

## (undated) DEVICE — DRAPE MINOR  6IN X 6IN REINFORC FENES ADHES

## (undated) DEVICE — SOLUTION IV IRRIG POUR BRL 0.9% SODIUM CHL 2F7124

## (undated) DEVICE — GUIDEWIRE ORTH L400MM DIA3.2MM FOR TFN

## (undated) DEVICE — CANISTER, RIGID, 1200CC: Brand: MEDLINE INDUSTRIES, INC.

## (undated) DEVICE — GLOVE ORTHO 8   MSG9480

## (undated) DEVICE — GLOVE,SURG,SENSICARE SLT,LF,PF,8: Brand: MEDLINE

## (undated) DEVICE — PENCIL ES L3M BTTN SWCH S STL HEX LOK BLDE ELECTRD HOLSTER

## (undated) DEVICE — DRESSING FOAM W3XL3IN GENTLE SIL FACE BORD ADH PD SUP ABSRB

## (undated) DEVICE — BASIC SINGLE BASIN 1-LF: Brand: MEDLINE INDUSTRIES, INC.

## (undated) DEVICE — SUTURE STRATAFIX SPRL SZ 2 0 L14IN ABSRB UD MH L36MM 1 2 CIR SXMD2B401

## (undated) DEVICE — HYPODERMIC SAFETY NEEDLE: Brand: MAGELLAN

## (undated) DEVICE — 3M™ COBAN™ NL STERILE NON-LATEX SELF-ADHERENT WRAP, 2084S, 4 IN X 5 YD (10 CM X 4,5 M), 18 ROLLS/CASE: Brand: 3M™ COBAN™

## (undated) DEVICE — SUTURE VCRL SZ 0 L27IN ABSRB UD L26MM CT-2 1/2 CIR J270H